# Patient Record
Sex: FEMALE | Race: WHITE | NOT HISPANIC OR LATINO | Employment: FULL TIME | URBAN - METROPOLITAN AREA
[De-identification: names, ages, dates, MRNs, and addresses within clinical notes are randomized per-mention and may not be internally consistent; named-entity substitution may affect disease eponyms.]

---

## 2019-04-04 ENCOUNTER — HOSPITAL ENCOUNTER (EMERGENCY)
Facility: HOSPITAL | Age: 59
Discharge: HOME | End: 2019-04-04
Attending: EMERGENCY MEDICINE | Admitting: EMERGENCY MEDICINE

## 2019-04-04 ENCOUNTER — OFFICE VISIT (OUTPATIENT)
Dept: FAMILY MEDICINE CLINIC | Facility: CLINIC | Age: 59
End: 2019-04-04

## 2019-04-04 ENCOUNTER — APPOINTMENT (OUTPATIENT)
Dept: CT IMAGING | Facility: HOSPITAL | Age: 59
End: 2019-04-04

## 2019-04-04 VITALS
RESPIRATION RATE: 16 BRPM | HEART RATE: 85 BPM | HEIGHT: 61 IN | BODY MASS INDEX: 28.32 KG/M2 | WEIGHT: 150 LBS | SYSTOLIC BLOOD PRESSURE: 156 MMHG | DIASTOLIC BLOOD PRESSURE: 87 MMHG | OXYGEN SATURATION: 100 % | TEMPERATURE: 98.1 F

## 2019-04-04 VITALS
BODY MASS INDEX: 25.61 KG/M2 | HEIGHT: 64 IN | TEMPERATURE: 97.8 F | DIASTOLIC BLOOD PRESSURE: 68 MMHG | WEIGHT: 150 LBS | HEART RATE: 72 BPM | SYSTOLIC BLOOD PRESSURE: 116 MMHG | RESPIRATION RATE: 12 BRPM | OXYGEN SATURATION: 98 %

## 2019-04-04 DIAGNOSIS — R10.12 LEFT UPPER QUADRANT PAIN: Primary | ICD-10-CM

## 2019-04-04 DIAGNOSIS — K29.00 ACUTE GASTRITIS WITHOUT HEMORRHAGE, UNSPECIFIED GASTRITIS TYPE: Primary | ICD-10-CM

## 2019-04-04 DIAGNOSIS — K44.9 HIATAL HERNIA: ICD-10-CM

## 2019-04-04 DIAGNOSIS — R19.7 DIARRHEA, UNSPECIFIED TYPE: ICD-10-CM

## 2019-04-04 LAB
ALBUMIN SERPL-MCNC: 4.6 G/DL (ref 3.5–5.2)
ALBUMIN/GLOB SERPL: 1.8 G/DL
ALP SERPL-CCNC: 80 U/L (ref 39–117)
ALT SERPL W P-5'-P-CCNC: 14 U/L (ref 1–33)
ANION GAP SERPL CALCULATED.3IONS-SCNC: 11.1 MMOL/L
AST SERPL-CCNC: 16 U/L (ref 1–32)
BASOPHILS # BLD AUTO: 0.06 10*3/MM3 (ref 0–0.2)
BASOPHILS NFR BLD AUTO: 1.2 % (ref 0–1.5)
BILIRUB SERPL-MCNC: <0.2 MG/DL (ref 0.2–1.2)
BILIRUB UR QL STRIP: NEGATIVE
BUN BLD-MCNC: 8 MG/DL (ref 6–20)
BUN/CREAT SERPL: 14.8 (ref 7–25)
CALCIUM SPEC-SCNC: 9.9 MG/DL (ref 8.6–10.5)
CHLORIDE SERPL-SCNC: 101 MMOL/L (ref 98–107)
CLARITY UR: CLEAR
CO2 SERPL-SCNC: 28.9 MMOL/L (ref 22–29)
COLOR UR: NORMAL
CREAT BLD-MCNC: 0.54 MG/DL (ref 0.57–1)
DEPRECATED RDW RBC AUTO: 48.2 FL (ref 37–54)
EOSINOPHIL # BLD AUTO: 0.26 10*3/MM3 (ref 0–0.4)
EOSINOPHIL NFR BLD AUTO: 5 % (ref 0.3–6.2)
ERYTHROCYTE [DISTWIDTH] IN BLOOD BY AUTOMATED COUNT: 14.7 % (ref 12.3–15.4)
GFR SERPL CREATININE-BSD FRML MDRD: 116 ML/MIN/1.73
GLOBULIN UR ELPH-MCNC: 2.5 GM/DL
GLUCOSE BLD-MCNC: 100 MG/DL (ref 65–99)
GLUCOSE UR STRIP-MCNC: NEGATIVE MG/DL
HCT VFR BLD AUTO: 44.8 % (ref 34–46.6)
HGB BLD-MCNC: 14.3 G/DL (ref 12–15.9)
HGB UR QL STRIP.AUTO: NEGATIVE
IMM GRANULOCYTES # BLD AUTO: 0.01 10*3/MM3 (ref 0–0.05)
IMM GRANULOCYTES NFR BLD AUTO: 0.2 % (ref 0–0.5)
KETONES UR QL STRIP: NEGATIVE
LEUKOCYTE ESTERASE UR QL STRIP.AUTO: NEGATIVE
LIPASE SERPL-CCNC: 24 U/L (ref 13–60)
LYMPHOCYTES # BLD AUTO: 1.72 10*3/MM3 (ref 0.7–3.1)
LYMPHOCYTES NFR BLD AUTO: 33.4 % (ref 19.6–45.3)
MCH RBC QN AUTO: 28.5 PG (ref 26.6–33)
MCHC RBC AUTO-ENTMCNC: 31.9 G/DL (ref 31.5–35.7)
MCV RBC AUTO: 89.2 FL (ref 79–97)
MONOCYTES # BLD AUTO: 0.52 10*3/MM3 (ref 0.1–0.9)
MONOCYTES NFR BLD AUTO: 10.1 % (ref 5–12)
NEUTROPHILS # BLD AUTO: 2.58 10*3/MM3 (ref 1.4–7)
NEUTROPHILS NFR BLD AUTO: 50.1 % (ref 42.7–76)
NITRITE UR QL STRIP: NEGATIVE
NRBC BLD AUTO-RTO: 0 /100 WBC (ref 0–0)
PH UR STRIP.AUTO: 6 [PH] (ref 4.5–8)
PLATELET # BLD AUTO: 317 10*3/MM3 (ref 140–450)
PMV BLD AUTO: 10 FL (ref 6–12)
POTASSIUM BLD-SCNC: 4 MMOL/L (ref 3.5–5.2)
PROT SERPL-MCNC: 7.1 G/DL (ref 6–8.5)
PROT UR QL STRIP: NEGATIVE
RBC # BLD AUTO: 5.02 10*6/MM3 (ref 3.77–5.28)
SODIUM BLD-SCNC: 141 MMOL/L (ref 136–145)
SP GR UR STRIP: 1.01 (ref 1–1.03)
TROPONIN T SERPL-MCNC: <0.01 NG/ML (ref 0–0.03)
UROBILINOGEN UR QL STRIP: NORMAL
WBC NRBC COR # BLD: 5.15 10*3/MM3 (ref 3.4–10.8)

## 2019-04-04 PROCEDURE — 74177 CT ABD & PELVIS W/CONTRAST: CPT

## 2019-04-04 PROCEDURE — 96375 TX/PRO/DX INJ NEW DRUG ADDON: CPT

## 2019-04-04 PROCEDURE — 80053 COMPREHEN METABOLIC PANEL: CPT | Performed by: PHYSICIAN ASSISTANT

## 2019-04-04 PROCEDURE — 25010000002 ONDANSETRON PER 1 MG: Performed by: PHYSICIAN ASSISTANT

## 2019-04-04 PROCEDURE — 99213 OFFICE O/P EST LOW 20 MIN: CPT | Performed by: FAMILY MEDICINE

## 2019-04-04 PROCEDURE — 0 DIATRIZOATE MEGLUMINE & SODIUM PER 1 ML: Performed by: EMERGENCY MEDICINE

## 2019-04-04 PROCEDURE — 85025 COMPLETE CBC W/AUTO DIFF WBC: CPT | Performed by: PHYSICIAN ASSISTANT

## 2019-04-04 PROCEDURE — 96374 THER/PROPH/DIAG INJ IV PUSH: CPT

## 2019-04-04 PROCEDURE — 25010000002 IOPAMIDOL 61 % SOLUTION: Performed by: EMERGENCY MEDICINE

## 2019-04-04 PROCEDURE — 83690 ASSAY OF LIPASE: CPT | Performed by: PHYSICIAN ASSISTANT

## 2019-04-04 PROCEDURE — 99284 EMERGENCY DEPT VISIT MOD MDM: CPT

## 2019-04-04 PROCEDURE — 81003 URINALYSIS AUTO W/O SCOPE: CPT | Performed by: PHYSICIAN ASSISTANT

## 2019-04-04 PROCEDURE — 84484 ASSAY OF TROPONIN QUANT: CPT | Performed by: PHYSICIAN ASSISTANT

## 2019-04-04 PROCEDURE — 99284 EMERGENCY DEPT VISIT MOD MDM: CPT | Performed by: EMERGENCY MEDICINE

## 2019-04-04 RX ORDER — DULOXETIN HYDROCHLORIDE 60 MG/1
60 CAPSULE, DELAYED RELEASE ORAL DAILY
COMMUNITY
Start: 2019-03-25 | End: 2019-06-25 | Stop reason: SDUPTHER

## 2019-04-04 RX ORDER — SODIUM CHLORIDE 9 MG/ML
INJECTION, SOLUTION INTRAVENOUS
Status: DISPENSED
Start: 2019-04-04 | End: 2019-04-05

## 2019-04-04 RX ORDER — SENNOSIDES 8.6 MG
1 TABLET ORAL NIGHTLY
COMMUNITY
End: 2022-04-03

## 2019-04-04 RX ORDER — SUCRALFATE ORAL 1 G/10ML
1 SUSPENSION ORAL
Qty: 280 ML | Refills: 0 | Status: SHIPPED | OUTPATIENT
Start: 2019-04-04 | End: 2019-04-11

## 2019-04-04 RX ORDER — ALUMINA, MAGNESIA, AND SIMETHICONE 2400; 2400; 240 MG/30ML; MG/30ML; MG/30ML
15 SUSPENSION ORAL ONCE
Status: COMPLETED | OUTPATIENT
Start: 2019-04-04 | End: 2019-04-04

## 2019-04-04 RX ORDER — ONDANSETRON 4 MG/1
4 TABLET, ORALLY DISINTEGRATING ORAL 4 TIMES DAILY PRN
Qty: 12 TABLET | Refills: 0 | Status: SHIPPED | OUTPATIENT
Start: 2019-04-04 | End: 2019-04-27

## 2019-04-04 RX ORDER — ONDANSETRON 2 MG/ML
4 INJECTION INTRAMUSCULAR; INTRAVENOUS ONCE
Status: COMPLETED | OUTPATIENT
Start: 2019-04-04 | End: 2019-04-04

## 2019-04-04 RX ORDER — SODIUM CHLORIDE 0.9 % (FLUSH) 0.9 %
10 SYRINGE (ML) INJECTION AS NEEDED
Status: DISCONTINUED | OUTPATIENT
Start: 2019-04-04 | End: 2019-04-04 | Stop reason: HOSPADM

## 2019-04-04 RX ORDER — GABAPENTIN 300 MG/1
300 CAPSULE ORAL 2 TIMES DAILY
COMMUNITY
Start: 2019-04-02 | End: 2019-10-08 | Stop reason: SDUPTHER

## 2019-04-04 RX ORDER — PANTOPRAZOLE SODIUM 40 MG/1
40 TABLET, DELAYED RELEASE ORAL DAILY
Qty: 30 TABLET | Refills: 0 | Status: SHIPPED | OUTPATIENT
Start: 2019-04-04 | End: 2019-05-20 | Stop reason: SDUPTHER

## 2019-04-04 RX ADMIN — ALUMINUM HYDROXIDE, MAGNESIUM HYDROXIDE, AND DIMETHICONE 15 ML: 400; 400; 40 SUSPENSION ORAL at 13:07

## 2019-04-04 RX ADMIN — LIDOCAINE HYDROCHLORIDE 15 ML: 20 SOLUTION ORAL; TOPICAL at 13:07

## 2019-04-04 RX ADMIN — FAMOTIDINE 20 MG: 10 INJECTION, SOLUTION INTRAVENOUS at 13:12

## 2019-04-04 RX ADMIN — DIATRIZOATE MEGLUMINE AND DIATRIZOATE SODIUM 30 ML: 600; 100 SOLUTION ORAL; RECTAL at 13:15

## 2019-04-04 RX ADMIN — IOPAMIDOL 100 ML: 612 INJECTION, SOLUTION INTRAVENOUS at 14:39

## 2019-04-04 RX ADMIN — ONDANSETRON 4 MG: 2 INJECTION, SOLUTION INTRAMUSCULAR; INTRAVENOUS at 13:11

## 2019-04-04 NOTE — ED PROVIDER NOTES
"Subjective   History of Present Illness  History of Present Illness    Chief complaint: \"I have a lot of gas\"    Location: mid epigastric, LUQ    Quality/Severity:  Bloated, severe    Timing/Duration: 1 week, constant    Modifying Factors: worse in afternoon after eating lunch.  Nothing makes better.  Has taken tums without relief.     Associated Symptoms: No.  No vomiting.  Positive occasional diarrhea in the afternoons.  Denies fevers or chills.  Denies chest pain.     Narrative: 58-year-old female with a history of IBS presents with abdominal pain and bloating as above.  She has not seen a GI physician in years.  She has never had an EGD.  She went to her primary care provider earlier today and was sent here for further evaluation with labs and CT scan.    Review of Systems   Constitutional: Negative.    Respiratory: Negative.    Cardiovascular: Negative.    Gastrointestinal: Positive for abdominal distention, abdominal pain and diarrhea. Negative for blood in stool.   Genitourinary: Negative.    Musculoskeletal: Negative.    Skin: Negative.    Neurological: Negative.    Hematological: Negative.    Psychiatric/Behavioral: Negative.    All other systems reviewed and are negative.      Past Medical History:   Diagnosis Date   • Fibromyalgia    • High cholesterol    • IBS (irritable bowel syndrome)        Allergies   Allergen Reactions   • Codeine    • Sulfa Antibiotics        History reviewed. No pertinent surgical history.    Family History   Problem Relation Age of Onset   • Hypertension Mother    • Hyperlipidemia Mother    • Hypertension Father    • Hyperlipidemia Father    • Stroke Father        Social History     Socioeconomic History   • Marital status: Unknown     Spouse name: Not on file   • Number of children: Not on file   • Years of education: Not on file   • Highest education level: Not on file   Tobacco Use   • Smoking status: Never Smoker   Substance and Sexual Activity   • Alcohol use: No   • Drug " use: No     No current facility-administered medications for this encounter.     Current Outpatient Medications:   •  DULoxetine (CYMBALTA) 60 MG capsule, Take 60 mg by mouth Daily., Disp: , Rfl:   •  gabapentin (NEURONTIN) 300 MG capsule, Take 300 mg by mouth 2 (Two) Times a Day., Disp: , Rfl:   •  senna (SENOKOT) 8.6 MG tablet tablet, Take 1 tablet by mouth Every Night., Disp: , Rfl:   •  cefdinir (OMNICEF) 300 MG capsule, Take 1 po bid, Disp: 20 capsule, Rfl: 0  •  ezetimibe-simvastatin (VYTORIN) 10-10 MG per tablet, Take 1 tablet by mouth Every Night., Disp: , Rfl:   •  methylphenidate (CONCERTA) 18 MG CR tablet, Take 18 mg by mouth Every Morning., Disp: , Rfl:   •  predniSONE (DELTASONE) 20 MG tablet, Take 3 tablets po daily x 5 days., Disp: 15 tablet, Rfl: 0  •  traZODone (DESYREL) 100 MG tablet, Take 100 mg by mouth Every Night., Disp: , Rfl:         Objective   Physical Exam  Vitals:    04/04/19 1132   BP: 157/88   Pulse: 74   Resp: 16   Temp: 98.1 °F (36.7 °C)   SpO2: 99%     GENERAL: Alert and oriented ×4.  No apparent distress.  SKIN: Warm, pink and dry  HEENT: Atraumatic normocephalic  LUNGS: Clear to auscultation bilaterally without wheezes, rales or rhonchi  CARDIAC: Regular rate and rhythm.  S1 and S2.  No murmurs, rubs or gallops.  ABD: Soft, moderate midepigastric and left upper quadrant tenderness.  No guarding or rebound tenderness.  Normal bowel sounds.  M/S: MAEW, no deformity  PSYCH: Normal mood and affect    Procedures           ED Course    Pepcid, Zofran and GI cocktail given    Results for orders placed or performed during the hospital encounter of 04/04/19   Comprehensive Metabolic Panel   Result Value Ref Range    Glucose 100 (H) 65 - 99 mg/dL    BUN 8 6 - 20 mg/dL    Creatinine 0.54 (L) 0.57 - 1.00 mg/dL    Sodium 141 136 - 145 mmol/L    Potassium 4.0 3.5 - 5.2 mmol/L    Chloride 101 98 - 107 mmol/L    CO2 28.9 22.0 - 29.0 mmol/L    Calcium 9.9 8.6 - 10.5 mg/dL    Total Protein 7.1 6.0 -  8.5 g/dL    Albumin 4.60 3.50 - 5.20 g/dL    ALT (SGPT) 14 1 - 33 U/L    AST (SGOT) 16 1 - 32 U/L    Alkaline Phosphatase 80 39 - 117 U/L    Total Bilirubin <0.2 (L) 0.2 - 1.2 mg/dL    eGFR Non African Amer 116 >60 mL/min/1.73    Globulin 2.5 gm/dL    A/G Ratio 1.8 g/dL    BUN/Creatinine Ratio 14.8 7.0 - 25.0    Anion Gap 11.1 mmol/L   Lipase   Result Value Ref Range    Lipase 24 13 - 60 U/L   Urinalysis With Microscopic If Indicated (No Culture) - Urine, Clean Catch   Result Value Ref Range    Color, UA Straw Yellow, Straw    Appearance, UA Clear Clear    pH, UA 6.0 4.5 - 8.0    Specific Gravity, UA 1.015 1.003 - 1.030    Glucose, UA Negative Negative    Ketones, UA Negative Negative    Bilirubin, UA Negative Negative    Blood, UA Negative Negative    Protein, UA Negative Negative    Leuk Esterase, UA Negative Negative    Nitrite, UA Negative Negative    Urobilinogen, UA 0.2 E.U./dL 0.2 - 1.0 E.U./dL   CBC Auto Differential   Result Value Ref Range    WBC 5.15 3.40 - 10.80 10*3/mm3    RBC 5.02 3.77 - 5.28 10*6/mm3    Hemoglobin 14.3 12.0 - 15.9 g/dL    Hematocrit 44.8 34.0 - 46.6 %    MCV 89.2 79.0 - 97.0 fL    MCH 28.5 26.6 - 33.0 pg    MCHC 31.9 31.5 - 35.7 g/dL    RDW 14.7 12.3 - 15.4 %    RDW-SD 48.2 37.0 - 54.0 fl    MPV 10.0 6.0 - 12.0 fL    Platelets 317 140 - 450 10*3/mm3    Neutrophil % 50.1 42.7 - 76.0 %    Lymphocyte % 33.4 19.6 - 45.3 %    Monocyte % 10.1 5.0 - 12.0 %    Eosinophil % 5.0 0.3 - 6.2 %    Basophil % 1.2 0.0 - 1.5 %    Immature Grans % 0.2 0.0 - 0.5 %    Neutrophils, Absolute 2.58 1.40 - 7.00 10*3/mm3    Lymphocytes, Absolute 1.72 0.70 - 3.10 10*3/mm3    Monocytes, Absolute 0.52 0.10 - 0.90 10*3/mm3    Eosinophils, Absolute 0.26 0.00 - 0.40 10*3/mm3    Basophils, Absolute 0.06 0.00 - 0.20 10*3/mm3    Immature Grans, Absolute 0.01 0.00 - 0.05 10*3/mm3    nRBC 0.0 0.0 - 0.0 /100 WBC   Troponin   Result Value Ref Range    Troponin T <0.010 0.000-<0.030 ng/mL     Reviewed ct a/p. Independently  viewed by me. Interpreted by radiologist. Discussed with pt.  Ct Abdomen Pelvis With Contrast    Result Date: 4/4/2019  Narrative: CT SCAN OF THE ABDOMEN AND PELVIS WITH CONTRAST 04/04/2019  HISTORY: Epigastric abdominal pain and diarrhea for one week.  TECHNIQUE: Spiral CT was performed through the abdomen and pelvis following oral and intravenous contrast administration. Radiation dose reduction techniques included automated exposure control or exposure modulation based on body size. Radiation audit for CT and nuclear cardiology exams in the last 12 months: 0.  ABDOMEN FINDINGS: The liver, spleen, pancreas, adrenal glands and kidneys are normal. The gallbladder is surgically absent. There is a small hiatal hernia.  PELVIS FINDINGS: There is questionable mild thickening of the wall of the sigmoid colon. This may be artifactual and related to relative under distention of the sigmoid colon with contrast. Correlate clinically for possible inflammatory or infectious sigmoid colitis. No bowel obstruction or abscess is seen. There is no significant adenopathy. There is no free fluid in the abdomen or pelvis. The lung bases are normal.       Impression: 1. Question is raised of some mild thickening of the wall of the sigmoid colon versus artifact from under distention. Correlate clinically for possible sigmoid inflammatory or infectious colitis. No bowel obstruction or abscess is seen. 2. Surgical absence of the gallbladder. 3. Small hiatal hernia.  This report was finalized on 4/4/2019 3:18 PM by Dr. Jeronimo Bowman MD.      Pt is afebrile with nl wbc. Doubt infectious.  F/u GI.  D/c with PPI, carafate, zofran.     Discussed pertinent labs and imaging findings with the patient/family.  Patient/Family voiced understanding of need to follow-up for recheck, further testing as needed.  Return to the emergency Department warnings were given.              MDM  My differential diagnosis for abdominal pain includes but is not  limited to:  Gastritis, gastroenteritis, peptic ulcer disease, GERD, esophageal perforation, acute appendicitis, mesenteric adenitis, Meckel’s diverticulum, epiploic appendagitis, diverticulitis, colon cancer, ulcerative colitis, Crohn’s disease, intussusception, small bowel obstruction, adhesions, ischemic bowel, perforated viscus, ileus, obstipation, biliary colic, cholecystitis, cholelithiasis, Samuel-Balwinder Jonathan, hepatitis, pancreatitis, common bile duct obstruction, cholangitis, bile leak, splenic trauma, splenic rupture, splenic infarction, splenic abscess, abdominal abscess, ascites, spontaneous bacterial peritonitis, hernia, UTI, cystitis,ureterolithiasis, urinary obstruction, ovarian cyst, torsion, PID, pelvic abscess, mittelschmerz, endometriosis, AAA, myocardial infarction, pneumonia, cancer, porphyria, DKA, medications, sickle cell, viral syndrome, zoster      Final diagnoses:   Acute gastritis without hemorrhage, unspecified gastritis type   Hiatal hernia       Dictated utilizing Dragon dictation       Anna Sierra PA-C  04/04/19 1539

## 2019-04-04 NOTE — PROGRESS NOTES
Subjective   Esther Duke is a 58 y.o. female is here for   Chief Complaint   Patient presents with   • Gas   • Bloated      x 1-2 weeks       Pt states she has a history of IBS.  A week ago she started having some stomach ache. She has occasional diarrhea in the afternoons.  She is concerned about possible infection in her bowels. She states the abdominal pain has been getting worse over the past week. Her stool has been smelling worse and has become somewhat gelatinous and mucousy. She has not been on any antibiotics recently.  No blood in her stool. No vomiting. No chest pain, fever, or SOA.         The following portions of the patient's history were reviewed and updated as appropriate: allergies, current medications, past family history, past medical history, past social history, past surgical history and problem list.    Past Medical History:   Diagnosis Date   • Fibromyalgia    • High cholesterol    • IBS (irritable bowel syndrome)        Family History   Problem Relation Age of Onset   • Hypertension Mother    • Hyperlipidemia Mother    • Hypertension Father    • Hyperlipidemia Father    • Stroke Father        Social History     Socioeconomic History   • Marital status: Unknown     Spouse name: Not on file   • Number of children: Not on file   • Years of education: Not on file   • Highest education level: Not on file   Tobacco Use   • Smoking status: Never Smoker   Substance and Sexual Activity   • Alcohol use: No   • Drug use: No         Current Outpatient Medications:   •  DULoxetine (CYMBALTA) 60 MG capsule, Take 60 mg by mouth Daily., Disp: , Rfl:   •  gabapentin (NEURONTIN) 300 MG capsule, Take 300 mg by mouth 2 (Two) Times a Day., Disp: , Rfl:   •  ondansetron ODT (ZOFRAN-ODT) 4 MG disintegrating tablet, Take 1 tablet by mouth 4 (Four) Times a Day As Needed for Nausea or Vomiting., Disp: 12 tablet, Rfl: 0  •  pantoprazole (PROTONIX) 40 MG EC tablet, Take 1 tablet by mouth Daily., Disp: 30 tablet,  Rfl: 0  •  senna (SENOKOT) 8.6 MG tablet tablet, Take 1 tablet by mouth Every Night., Disp: , Rfl:   •  sucralfate (CARAFATE) 1 GM/10ML suspension, Take 10 mL by mouth 4 (Four) Times a Day With Meals & at Bedtime for 7 days., Disp: 280 mL, Rfl: 0    Review of Systems   Constitutional: Negative.  Negative for activity change, chills, fever and unexpected weight change.   HENT: Negative.  Negative for congestion.    Eyes: Negative.  Negative for visual disturbance.   Respiratory: Negative.  Negative for shortness of breath and wheezing.    Cardiovascular: Positive for chest pain. Negative for palpitations.        Tightness in chest   Gastrointestinal: Positive for abdominal distention, abdominal pain and diarrhea. Negative for blood in stool and constipation.   Endocrine: Negative.  Negative for cold intolerance and heat intolerance.   Genitourinary: Negative.  Negative for difficulty urinating and hematuria.   Musculoskeletal: Negative.  Negative for gait problem and neck stiffness.   Skin: Negative.  Negative for color change and rash.   Allergic/Immunologic: Negative.  Negative for immunocompromised state.   Neurological: Negative.  Negative for weakness and light-headedness.   Hematological: Negative.  Negative for adenopathy.   Psychiatric/Behavioral: Negative.  Negative for sleep disturbance. The patient is not nervous/anxious.        Objective   Physical Exam   Constitutional: She is oriented to person, place, and time. She appears well-developed and well-nourished. No distress.   HENT:   Head: Normocephalic and atraumatic.   Right Ear: External ear normal.   Left Ear: External ear normal.   Nose: Nose normal.   Mouth/Throat: Oropharynx is clear and moist. No oropharyngeal exudate.   Eyes: EOM are normal. Pupils are equal, round, and reactive to light. Right eye exhibits no discharge. Left eye exhibits no discharge. No scleral icterus.   Neck: Normal range of motion. Neck supple. No tracheal deviation present.  No thyromegaly present.   Cardiovascular: Normal rate, regular rhythm, normal heart sounds and intact distal pulses. Exam reveals no gallop and no friction rub.   No murmur heard.  Pulmonary/Chest: Effort normal and breath sounds normal. No stridor. No respiratory distress. She has no wheezes. She has no rales.   Abdominal: Normal appearance. Bowel sounds are increased. There is no hepatosplenomegaly. There is tenderness in the left upper quadrant and left lower quadrant. There is no rebound, no guarding, no CVA tenderness, no tenderness at McBurney's point and negative Franklin's sign.   Musculoskeletal: Normal range of motion. She exhibits no edema.   Lymphadenopathy:     She has no cervical adenopathy.   Neurological: She is alert and oriented to person, place, and time.   Skin: Skin is warm and dry. No rash noted. She is not diaphoretic. No erythema. No pallor.   Nursing note and vitals reviewed.      Procedures    Assessment/Plan   Esther was seen today for gas and bloated.    Diagnoses and all orders for this visit:    Left upper quadrant pain    Diarrhea, unspecified type      I advised the patient to go to the emergency room due to her history of abdominal pain and abdominal pain on physical exam.  She may have colitis or an acute abdomen.  She agrees to go to the emergency room now.  She does not want an ambulance.

## 2019-04-04 NOTE — ED NOTES
"Chief Complaint   Patient presents with   • Abdominal Pain     has had an increase in \"gas\" after eating lunch.  Seen by PMD and sent in to ED          Sofia Terrell RN  04/04/19 0375    "

## 2019-04-17 ENCOUNTER — OFFICE VISIT (OUTPATIENT)
Dept: GASTROENTEROLOGY | Facility: CLINIC | Age: 59
End: 2019-04-17

## 2019-04-17 VITALS
SYSTOLIC BLOOD PRESSURE: 132 MMHG | WEIGHT: 151 LBS | HEIGHT: 61 IN | DIASTOLIC BLOOD PRESSURE: 84 MMHG | BODY MASS INDEX: 28.51 KG/M2

## 2019-04-17 DIAGNOSIS — K21.9 GASTROESOPHAGEAL REFLUX DISEASE, ESOPHAGITIS PRESENCE NOT SPECIFIED: ICD-10-CM

## 2019-04-17 DIAGNOSIS — R09.89 GLOBUS SENSATION: ICD-10-CM

## 2019-04-17 DIAGNOSIS — R10.12 LEFT UPPER QUADRANT PAIN: Primary | ICD-10-CM

## 2019-04-17 DIAGNOSIS — R93.5 ABNORMAL CT OF THE ABDOMEN: ICD-10-CM

## 2019-04-17 DIAGNOSIS — R10.30 LOWER ABDOMINAL PAIN: ICD-10-CM

## 2019-04-17 DIAGNOSIS — R19.4 CHANGE IN BOWEL HABITS: ICD-10-CM

## 2019-04-17 DIAGNOSIS — K59.00 CONSTIPATION, UNSPECIFIED CONSTIPATION TYPE: ICD-10-CM

## 2019-04-17 PROCEDURE — 99204 OFFICE O/P NEW MOD 45 MIN: CPT | Performed by: INTERNAL MEDICINE

## 2019-04-17 RX ORDER — SUCRALFATE 1 G/1
TABLET ORAL
COMMUNITY
Start: 2019-04-08 | End: 2019-04-27

## 2019-04-17 RX ORDER — SODIUM, POTASSIUM,MAG SULFATES 17.5-3.13G
1 SOLUTION, RECONSTITUTED, ORAL ORAL EVERY 12 HOURS
Qty: 2 BOTTLE | Refills: 0 | Status: SHIPPED | OUTPATIENT
Start: 2019-04-17 | End: 2019-04-27

## 2019-04-17 NOTE — PROGRESS NOTES
PATIENT INFORMATION  Esther Duke       - 1960    CHIEF COMPLAINT  Chief Complaint   Patient presents with   • Abdominal Pain   • Constipation   • Heartburn       HISTORY OF PRESENT ILLNESS  HPI    57 yo with long history of chronic constipation dependent on senna daily and fibromyalgia. She has noticed increasing bloating, gas, decreased bm and some mucus in  The stool. Weight has been increasing. Last CLS was about 6 years ago, normal per her recollection. No egd.  No dysphagia or odynophagia.  She has had epigastric pain, burning worse at night. No radiation, moderate severity, with constant globus sensation.                                                                                            She did go the about 2 weeks ago and abdominal pain and had CT done:  HISTORY:  Epigastric abdominal pain and diarrhea for one week.     TECHNIQUE:  Spiral CT was performed through the abdomen and pelvis following oral  and intravenous contrast administration. Radiation dose reduction  techniques included automated exposure control or exposure modulation  based on body size. Radiation audit for CT and nuclear cardiology exams  in the last 12 months: 0.     ABDOMEN FINDINGS:  The liver, spleen, pancreas, adrenal glands and kidneys are normal. The  gallbladder is surgically absent. There is a small hiatal hernia.     PELVIS FINDINGS:  There is questionable mild thickening of the wall of the sigmoid colon.  This may be artifactual and related to relative under distention of the  sigmoid colon with contrast. Correlate clinically for possible  inflammatory or infectious sigmoid colitis. No bowel obstruction or  abscess is seen. There is no significant adenopathy. There is no free  fluid in the abdomen or pelvis. The lung bases are normal.        IMPRESSION:  1. Question is raised of some mild thickening of the wall of the sigmoid  colon versus artifact from under distention. Correlate clinically  for  possible sigmoid inflammatory or infectious colitis. No bowel  obstruction or abscess is seen.  2. Surgical absence of the gallbladder.  3. Small hiatal hernia.      Coffee in am  One glass of tea  No etoh  No soda  Dinner at 530  Bed at 8  No snack      There has been one month of llq pain and worsening constipation. Having a bm does partially help.    REVIEW OF SYSTEMS  Review of Systems   Constitutional: Positive for fatigue.   HENT: Positive for mouth sores and sinus pressure.    Gastrointestinal: Positive for abdominal pain and constipation.        Reflux   Genitourinary: Positive for dyspareunia and frequency.   Musculoskeletal: Positive for arthralgias, back pain, myalgias, neck pain and neck stiffness.   Allergic/Immunologic: Positive for environmental allergies.   Psychiatric/Behavioral: The patient is nervous/anxious.    All other systems reviewed and are negative.        ACTIVE PROBLEMS  There are no active problems to display for this patient.        PAST MEDICAL HISTORY  Past Medical History:   Diagnosis Date   • Fibromyalgia    • High cholesterol    • IBS (irritable bowel syndrome)          SURGICAL HISTORY  Past Surgical History:   Procedure Laterality Date   • COLONOSCOPY           FAMILY HISTORY  Family History   Problem Relation Age of Onset   • Hypertension Mother    • Hyperlipidemia Mother    • Hypertension Father    • Hyperlipidemia Father    • Stroke Father    • Colon cancer Neg Hx    • Colon polyps Neg Hx          SOCIAL HISTORY  Social History     Occupational History   • Not on file   Tobacco Use   • Smoking status: Never Smoker   Substance and Sexual Activity   • Alcohol use: No   • Drug use: No   • Sexual activity: Not on file       Debilities/Disabilities Identified: None    Emotional Behavior: Appropriate    CURRENT MEDICATIONS    Current Outpatient Medications:   •  azithromycin (ZITHROMAX Z-ELEAZAR) 250 MG tablet, Take 2 tablets the first day, then 1 tablet daily for 4 days., Disp: 6  "tablet, Rfl: 0  •  DULoxetine (CYMBALTA) 60 MG capsule, Take 60 mg by mouth Daily., Disp: , Rfl:   •  gabapentin (NEURONTIN) 300 MG capsule, Take 300 mg by mouth 2 (Two) Times a Day., Disp: , Rfl:   •  ondansetron ODT (ZOFRAN-ODT) 4 MG disintegrating tablet, Take 1 tablet by mouth 4 (Four) Times a Day As Needed for Nausea or Vomiting., Disp: 12 tablet, Rfl: 0  •  pantoprazole (PROTONIX) 40 MG EC tablet, Take 1 tablet by mouth Daily., Disp: 30 tablet, Rfl: 0  •  senna (SENOKOT) 8.6 MG tablet tablet, Take 1 tablet by mouth Every Night., Disp: , Rfl:   •  sodium-potassium-magnesium sulfates (SUPREP BOWEL PREP KIT) 17.5-3.13-1.6 GM/177ML solution oral solution, Take 1 bottle by mouth Every 12 (Twelve) Hours., Disp: 2 bottle, Rfl: 0  •  sucralfate (CARAFATE) 1 g tablet, , Disp: , Rfl:     ALLERGIES  Codeine and Sulfa antibiotics    VITALS  Vitals:    04/17/19 1225   BP: 132/84   Weight: 68.5 kg (151 lb)   Height: 154.9 cm (60.98\")       LAST RESULTS   Admission on 04/04/2019, Discharged on 04/04/2019   Component Date Value Ref Range Status   • Glucose 04/04/2019 100* 65 - 99 mg/dL Final   • BUN 04/04/2019 8  6 - 20 mg/dL Final   • Creatinine 04/04/2019 0.54* 0.57 - 1.00 mg/dL Final   • Sodium 04/04/2019 141  136 - 145 mmol/L Final   • Potassium 04/04/2019 4.0  3.5 - 5.2 mmol/L Final   • Chloride 04/04/2019 101  98 - 107 mmol/L Final   • CO2 04/04/2019 28.9  22.0 - 29.0 mmol/L Final   • Calcium 04/04/2019 9.9  8.6 - 10.5 mg/dL Final   • Total Protein 04/04/2019 7.1  6.0 - 8.5 g/dL Final   • Albumin 04/04/2019 4.60  3.50 - 5.20 g/dL Final   • ALT (SGPT) 04/04/2019 14  1 - 33 U/L Final   • AST (SGOT) 04/04/2019 16  1 - 32 U/L Final   • Alkaline Phosphatase 04/04/2019 80  39 - 117 U/L Final   • Total Bilirubin 04/04/2019 <0.2* 0.2 - 1.2 mg/dL Final   • eGFR Non African Amer 04/04/2019 116  >60 mL/min/1.73 Final   • Globulin 04/04/2019 2.5  gm/dL Final   • A/G Ratio 04/04/2019 1.8  g/dL Final   • BUN/Creatinine Ratio " 04/04/2019 14.8  7.0 - 25.0 Final   • Anion Gap 04/04/2019 11.1  mmol/L Final   • Lipase 04/04/2019 24  13 - 60 U/L Final   • Color, UA 04/04/2019 Straw  Yellow, Straw Final   • Appearance, UA 04/04/2019 Clear  Clear Final   • pH, UA 04/04/2019 6.0  4.5 - 8.0 Final   • Specific Gravity, UA 04/04/2019 1.015  1.003 - 1.030 Final   • Glucose, UA 04/04/2019 Negative  Negative Final   • Ketones, UA 04/04/2019 Negative  Negative Final   • Bilirubin, UA 04/04/2019 Negative  Negative Final   • Blood, UA 04/04/2019 Negative  Negative Final   • Protein, UA 04/04/2019 Negative  Negative Final   • Leuk Esterase, UA 04/04/2019 Negative  Negative Final   • Nitrite, UA 04/04/2019 Negative  Negative Final   • Urobilinogen, UA 04/04/2019 0.2 E.U./dL  0.2 - 1.0 E.U./dL Final   • WBC 04/04/2019 5.15  3.40 - 10.80 10*3/mm3 Final   • RBC 04/04/2019 5.02  3.77 - 5.28 10*6/mm3 Final   • Hemoglobin 04/04/2019 14.3  12.0 - 15.9 g/dL Final   • Hematocrit 04/04/2019 44.8  34.0 - 46.6 % Final   • MCV 04/04/2019 89.2  79.0 - 97.0 fL Final   • MCH 04/04/2019 28.5  26.6 - 33.0 pg Final   • MCHC 04/04/2019 31.9  31.5 - 35.7 g/dL Final   • RDW 04/04/2019 14.7  12.3 - 15.4 % Final   • RDW-SD 04/04/2019 48.2  37.0 - 54.0 fl Final   • MPV 04/04/2019 10.0  6.0 - 12.0 fL Final   • Platelets 04/04/2019 317  140 - 450 10*3/mm3 Final   • Neutrophil % 04/04/2019 50.1  42.7 - 76.0 % Final   • Lymphocyte % 04/04/2019 33.4  19.6 - 45.3 % Final   • Monocyte % 04/04/2019 10.1  5.0 - 12.0 % Final   • Eosinophil % 04/04/2019 5.0  0.3 - 6.2 % Final   • Basophil % 04/04/2019 1.2  0.0 - 1.5 % Final   • Immature Grans % 04/04/2019 0.2  0.0 - 0.5 % Final   • Neutrophils, Absolute 04/04/2019 2.58  1.40 - 7.00 10*3/mm3 Final   • Lymphocytes, Absolute 04/04/2019 1.72  0.70 - 3.10 10*3/mm3 Final   • Monocytes, Absolute 04/04/2019 0.52  0.10 - 0.90 10*3/mm3 Final   • Eosinophils, Absolute 04/04/2019 0.26  0.00 - 0.40 10*3/mm3 Final   • Basophils, Absolute 04/04/2019 0.06   0.00 - 0.20 10*3/mm3 Final   • Immature Grans, Absolute 04/04/2019 0.01  0.00 - 0.05 10*3/mm3 Final   • nRBC 04/04/2019 0.0  0.0 - 0.0 /100 WBC Final   • Troponin T 04/04/2019 <0.010  0.000-<0.030 ng/mL Final     Ct Abdomen Pelvis With Contrast    Result Date: 4/4/2019  Narrative: CT SCAN OF THE ABDOMEN AND PELVIS WITH CONTRAST 04/04/2019  HISTORY: Epigastric abdominal pain and diarrhea for one week.  TECHNIQUE: Spiral CT was performed through the abdomen and pelvis following oral and intravenous contrast administration. Radiation dose reduction techniques included automated exposure control or exposure modulation based on body size. Radiation audit for CT and nuclear cardiology exams in the last 12 months: 0.  ABDOMEN FINDINGS: The liver, spleen, pancreas, adrenal glands and kidneys are normal. The gallbladder is surgically absent. There is a small hiatal hernia.  PELVIS FINDINGS: There is questionable mild thickening of the wall of the sigmoid colon. This may be artifactual and related to relative under distention of the sigmoid colon with contrast. Correlate clinically for possible inflammatory or infectious sigmoid colitis. No bowel obstruction or abscess is seen. There is no significant adenopathy. There is no free fluid in the abdomen or pelvis. The lung bases are normal.       Impression: 1. Question is raised of some mild thickening of the wall of the sigmoid colon versus artifact from under distention. Correlate clinically for possible sigmoid inflammatory or infectious colitis. No bowel obstruction or abscess is seen. 2. Surgical absence of the gallbladder. 3. Small hiatal hernia.  This report was finalized on 4/4/2019 3:18 PM by Dr. Jeronimo Bowman MD.        PHYSICAL EXAM  Physical Exam   Constitutional: She is oriented to person, place, and time. She appears well-developed and well-nourished. No distress.   HENT:   Head: Normocephalic and atraumatic.   Mouth/Throat: Oropharynx is clear and moist.   Eyes:  EOM are normal. Pupils are equal, round, and reactive to light.   Neck: Normal range of motion. No tracheal deviation present.   Cardiovascular: Normal rate, regular rhythm, normal heart sounds and intact distal pulses. Exam reveals no gallop and no friction rub.   No murmur heard.  Pulmonary/Chest: Effort normal and breath sounds normal. No stridor. No respiratory distress. She has no wheezes. She has no rales. She exhibits no tenderness.   Abdominal: Soft. Bowel sounds are normal. She exhibits no distension. There is no tenderness. There is no rebound and no guarding.   Epigastric and llq pain, mild no rebound or guarding   Musculoskeletal: She exhibits no edema.   Lymphadenopathy:     She has no cervical adenopathy.   Neurological: She is alert and oriented to person, place, and time.   Skin: Skin is warm. She is not diaphoretic.   Psychiatric: She has a normal mood and affect. Her behavior is normal. Judgment and thought content normal.   Nursing note and vitals reviewed.      ASSESSMENT  Diagnoses and all orders for this visit:    Left upper quadrant pain  -     Cancel: Case Request; Standing  -     Cancel: Case Request  -     External Facility Surgical/Procedural Request; Future    Constipation, unspecified constipation type  -     Cancel: Case Request; Standing  -     Cancel: Case Request  -     External Facility Surgical/Procedural Request; Future    Gastroesophageal reflux disease, esophagitis presence not specified  -     Cancel: Case Request; Standing  -     Cancel: Case Request  -     External Facility Surgical/Procedural Request; Future    Lower abdominal pain  -     External Facility Surgical/Procedural Request; Future    Abnormal CT of the abdomen  -     External Facility Surgical/Procedural Request; Future    Change in bowel habits  -     External Facility Surgical/Procedural Request; Future    Other orders  -     sucralfate (CARAFATE) 1 g tablet  -     Cancel: Follow Anesthesia Guidelines / Standing  Orders; Future  -     Cancel: Implement Anesthesia orders day of procedure.; Standing  -     Cancel: Obtain informed consent; Standing  -     sodium-potassium-magnesium sulfates (SUPREP BOWEL PREP KIT) 17.5-3.13-1.6 GM/177ML solution oral solution; Take 1 bottle by mouth Every 12 (Twelve) Hours.          PLAN  No Follow-up on file.        cls from 2013 reviewed, normal  Labs from ed reviewed. Normal cbc and cmp and lipase  Antireflux measures and dietary modifications reviewed. Low acid diet reviewed. Keep head of bed elevated. Stop eating/drinking at least 3 hours prior to bedtime. Eliminate caffeine and carbonated beverages.  Weight loss encouraged if BMI over 25.Indications, risks and procedure were discussed with the patient, including but not limited to, bleeding, infection, possibility of perforation and possible polypectomy. All of the patient's questions were answered, and signed informed consent was obtained and placed on the chart.

## 2019-04-23 ENCOUNTER — TELEPHONE (OUTPATIENT)
Dept: GASTROENTEROLOGY | Facility: CLINIC | Age: 59
End: 2019-04-23

## 2019-04-26 ENCOUNTER — TELEPHONE (OUTPATIENT)
Dept: FAMILY MEDICINE CLINIC | Facility: CLINIC | Age: 59
End: 2019-04-26

## 2019-04-26 RX ORDER — AZITHROMYCIN 250 MG/1
TABLET, FILM COATED ORAL
Qty: 6 TABLET | Refills: 0 | Status: SHIPPED | OUTPATIENT
Start: 2019-04-26 | End: 2019-04-27

## 2019-04-26 NOTE — TELEPHONE ENCOUNTER
Pt called and states she is having ear pain x's 3 days would like to know if you could call in a medication

## 2019-05-14 ENCOUNTER — OUTSIDE FACILITY SERVICE (OUTPATIENT)
Dept: GASTROENTEROLOGY | Facility: CLINIC | Age: 59
End: 2019-05-14

## 2019-05-14 PROCEDURE — 88305 TISSUE EXAM BY PATHOLOGIST: CPT | Performed by: INTERNAL MEDICINE

## 2019-05-14 PROCEDURE — 43239 EGD BIOPSY SINGLE/MULTIPLE: CPT | Performed by: INTERNAL MEDICINE

## 2019-05-14 PROCEDURE — 45378 DIAGNOSTIC COLONOSCOPY: CPT | Performed by: INTERNAL MEDICINE

## 2019-05-15 ENCOUNTER — LAB REQUISITION (OUTPATIENT)
Dept: LAB | Facility: HOSPITAL | Age: 59
End: 2019-05-15

## 2019-05-15 DIAGNOSIS — R10.12 LEFT UPPER QUADRANT PAIN: ICD-10-CM

## 2019-05-16 LAB
CYTO UR: NORMAL
LAB AP CASE REPORT: NORMAL
LAB AP CLINICAL INFORMATION: NORMAL
PATH REPORT.FINAL DX SPEC: NORMAL
PATH REPORT.GROSS SPEC: NORMAL

## 2019-05-17 NOTE — PROGRESS NOTES
Gastric biopsies with some gastritis no H. pylori.  Small bowel biopsies are normal.  Gastric polyp is fundic gland.  Esophageal biopsies with some reflux esophagitis no Almonte's.  Put her on for repeat colonoscopy in 10 years

## 2019-05-20 RX ORDER — PANTOPRAZOLE SODIUM 40 MG/1
40 TABLET, DELAYED RELEASE ORAL DAILY
Qty: 90 TABLET | Refills: 3 | Status: SHIPPED | OUTPATIENT
Start: 2019-05-20 | End: 2020-12-08 | Stop reason: SDUPTHER

## 2019-05-21 RX ORDER — GABAPENTIN 300 MG/1
CAPSULE ORAL
Qty: 90 CAPSULE | Refills: 2 | OUTPATIENT
Start: 2019-05-21

## 2019-05-22 ENCOUNTER — OFFICE VISIT (OUTPATIENT)
Dept: FAMILY MEDICINE CLINIC | Facility: CLINIC | Age: 59
End: 2019-05-22

## 2019-05-22 VITALS
OXYGEN SATURATION: 98 % | HEIGHT: 65 IN | DIASTOLIC BLOOD PRESSURE: 64 MMHG | WEIGHT: 149 LBS | HEART RATE: 68 BPM | BODY MASS INDEX: 24.83 KG/M2 | TEMPERATURE: 98.1 F | SYSTOLIC BLOOD PRESSURE: 118 MMHG

## 2019-05-22 DIAGNOSIS — F32.9 REACTIVE DEPRESSION: ICD-10-CM

## 2019-05-22 DIAGNOSIS — K21.9 GASTROESOPHAGEAL REFLUX DISEASE WITHOUT ESOPHAGITIS: ICD-10-CM

## 2019-05-22 DIAGNOSIS — M79.7 FIBROMYALGIA: Primary | ICD-10-CM

## 2019-05-22 DIAGNOSIS — E78.49 OTHER HYPERLIPIDEMIA: ICD-10-CM

## 2019-05-22 LAB
ALBUMIN SERPL-MCNC: 4.5 G/DL (ref 3.5–5.2)
ALBUMIN/GLOB SERPL: 2 G/DL
ALP SERPL-CCNC: 83 U/L (ref 39–117)
ALT SERPL-CCNC: 13 U/L (ref 1–33)
AST SERPL-CCNC: 15 U/L (ref 1–32)
BILIRUB SERPL-MCNC: 0.2 MG/DL (ref 0.2–1.2)
BUN SERPL-MCNC: 9 MG/DL (ref 6–20)
BUN/CREAT SERPL: 12.5 (ref 7–25)
CALCIUM SERPL-MCNC: 10 MG/DL (ref 8.6–10.5)
CHLORIDE SERPL-SCNC: 101 MMOL/L (ref 98–107)
CHOLEST SERPL-MCNC: 285 MG/DL (ref 0–200)
CHOLEST/HDLC SERPL: 3.24 {RATIO}
CO2 SERPL-SCNC: 28.8 MMOL/L (ref 22–29)
CREAT SERPL-MCNC: 0.72 MG/DL (ref 0.57–1)
GLOBULIN SER CALC-MCNC: 2.2 GM/DL
GLUCOSE SERPL-MCNC: 100 MG/DL (ref 65–99)
HDLC SERPL-MCNC: 88 MG/DL (ref 40–60)
LDLC SERPL CALC-MCNC: 178 MG/DL (ref 0–100)
POTASSIUM SERPL-SCNC: 4.5 MMOL/L (ref 3.5–5.2)
PROT SERPL-MCNC: 6.7 G/DL (ref 6–8.5)
SODIUM SERPL-SCNC: 142 MMOL/L (ref 136–145)
TRIGL SERPL-MCNC: 97 MG/DL (ref 0–150)
TSH SERPL DL<=0.005 MIU/L-ACNC: 3.16 MIU/ML (ref 0.27–4.2)
VLDLC SERPL CALC-MCNC: 19.4 MG/DL

## 2019-05-22 PROCEDURE — 99213 OFFICE O/P EST LOW 20 MIN: CPT | Performed by: INTERNAL MEDICINE

## 2019-05-22 RX ORDER — CYCLOSPORINE 0.5 MG/ML
EMULSION OPHTHALMIC
COMMUNITY
Start: 2019-04-29 | End: 2021-03-24

## 2019-05-22 NOTE — PROGRESS NOTES
MILVIA@  Esther DERRELL Duke is a 59 y.o. female.     Chief Complaint   Patient presents with   • Hypertension   • Fibromyalgia   • Depression       History of Present Illness   Patient here for follow-up on fibromyalgia, depression, pain.  She cannot tolerate statins she is stop all of them.  She tried different ones.  Reports she wants her life back does not want the pain that comes with statin.  Not interested in taking more.  She has seen Dr. Stringer recently had EGD and colonoscopy.  She is on Protonix.  Still follows with the rheumatologist Dr. Hayes.  Reviewed patient colonoscopy and EGD Report.  The following portions of the patient's history were reviewed and updated as appropriate: allergies, current medications, past family history, past medical history, past social history, past surgical history and problem list.    Review of Systems   Constitutional: Negative for activity change, appetite change, fatigue and fever.   Eyes: Negative for blurred vision and double vision.   Respiratory: Negative for shortness of breath.    Cardiovascular: Negative for chest pain, palpitations and leg swelling.   Gastrointestinal: Negative.    Musculoskeletal: Positive for myalgias.   Neurological: Negative for dizziness, syncope, light-headedness and headache.       Allergies   Allergen Reactions   • Codeine    • Sulfa Antibiotics        Current Outpatient Medications on File Prior to Visit   Medication Sig Dispense Refill   • DULoxetine (CYMBALTA) 60 MG capsule Take 60 mg by mouth Daily.     • gabapentin (NEURONTIN) 300 MG capsule Take 300 mg by mouth 2 (Two) Times a Day.     • pantoprazole (PROTONIX) 40 MG EC tablet Take 1 tablet by mouth Daily. 90 tablet 3   • senna (SENOKOT) 8.6 MG tablet tablet Take 1 tablet by mouth Every Night.     • amoxicillin (AMOXIL) 875 MG tablet Take 1 tablet by mouth 2 (Two) Times a Day. 20 tablet 0   • RESTASIS 0.05 % ophthalmic emulsion        No current facility-administered  medications on file prior to visit.        Family History   Problem Relation Age of Onset   • Hypertension Mother    • Hyperlipidemia Mother    • Hypertension Father    • Hyperlipidemia Father    • Stroke Father    • Colon cancer Neg Hx    • Colon polyps Neg Hx        Past Medical History:   Diagnosis Date   • Fibromyalgia    • Hernia, hiatal    • High cholesterol    • IBS (irritable bowel syndrome)        Past Surgical History:   Procedure Laterality Date   • COLONOSCOPY         Social History     Socioeconomic History   • Marital status: Unknown     Spouse name: Not on file   • Number of children: Not on file   • Years of education: Not on file   • Highest education level: Not on file   Tobacco Use   • Smoking status: Never Smoker   Substance and Sexual Activity   • Alcohol use: No   • Drug use: No       Patient Active Problem List   Diagnosis   • Fibromyalgia   • Other hyperlipidemia   • Reactive depression   • Gastroesophageal reflux disease without esophagitis       Vitals:    05/22/19 0853   BP: 118/64   Pulse: 68   Temp: 98.1 °F (36.7 °C)   SpO2: 98%       Objective   Physical Exam   Constitutional: She is oriented to person, place, and time. She appears well-developed.   Eyes: Pupils are equal, round, and reactive to light.   Neck: Normal range of motion. Neck supple. No JVD present. No tracheal deviation present. No thyromegaly present.   Cardiovascular: Normal rate, regular rhythm and intact distal pulses.   No murmur heard.  Pulmonary/Chest: Effort normal and breath sounds normal. No respiratory distress. She has no wheezes.   Abdominal: Soft. Bowel sounds are normal.   Musculoskeletal: She exhibits tenderness. She exhibits no edema.   Lymphadenopathy:     She has no cervical adenopathy.   Neurological: She is alert and oriented to person, place, and time.   Psychiatric: She has a normal mood and affect. Her behavior is normal.         Assessment/Plan   Esther was seen today for hypertension, fibromyalgia  and depression.    Diagnoses and all orders for this visit:    Fibromyalgia  -     Lipid Panel With / Chol / HDL Ratio  -     Comprehensive Metabolic Panel  -     TSH    Reactive depression  -     Lipid Panel With / Chol / HDL Ratio  -     Comprehensive Metabolic Panel  -     TSH    Other hyperlipidemia    Gastroesophageal reflux disease without esophagitis    Continue diet and exercise.  Discussed with patient statin.  She continues to decline it at this time.  Patient on Neurontin.  Kaspar and consent done.  Report if does not take Neurontin has a lot of pain.  She is off Concerta and statins now.  Return in 3 months

## 2019-05-24 RX ORDER — EZETIMIBE 10 MG/1
10 TABLET ORAL DAILY
Qty: 30 TABLET | Refills: 2 | Status: SHIPPED | OUTPATIENT
Start: 2019-05-24 | End: 2020-04-15

## 2019-06-26 RX ORDER — DULOXETIN HYDROCHLORIDE 60 MG/1
CAPSULE, DELAYED RELEASE ORAL
Qty: 30 CAPSULE | Refills: 1 | Status: SHIPPED | OUTPATIENT
Start: 2019-06-26 | End: 2019-08-26 | Stop reason: SDUPTHER

## 2019-08-26 RX ORDER — DULOXETIN HYDROCHLORIDE 60 MG/1
CAPSULE, DELAYED RELEASE ORAL
Qty: 30 CAPSULE | Refills: 5 | Status: SHIPPED | OUTPATIENT
Start: 2019-08-26 | End: 2020-02-19

## 2019-09-25 ENCOUNTER — OFFICE VISIT (OUTPATIENT)
Dept: GASTROENTEROLOGY | Facility: CLINIC | Age: 59
End: 2019-09-25

## 2019-09-25 VITALS
SYSTOLIC BLOOD PRESSURE: 122 MMHG | BODY MASS INDEX: 25.66 KG/M2 | HEIGHT: 65 IN | WEIGHT: 154 LBS | DIASTOLIC BLOOD PRESSURE: 72 MMHG

## 2019-09-25 DIAGNOSIS — R14.0 BLOATING: ICD-10-CM

## 2019-09-25 DIAGNOSIS — K21.9 GASTROESOPHAGEAL REFLUX DISEASE, ESOPHAGITIS PRESENCE NOT SPECIFIED: Primary | ICD-10-CM

## 2019-09-25 PROCEDURE — 99213 OFFICE O/P EST LOW 20 MIN: CPT | Performed by: INTERNAL MEDICINE

## 2019-09-25 NOTE — PROGRESS NOTES
"    PATIENT INFORMATION  Esther Duke       - 1960    CHIEF COMPLAINT  Chief Complaint   Patient presents with   • Follow-up     follow up on EGD       HISTORY OF PRESENT ILLNESS  HPI    She is here in follow up after egd and cls. Pathology is reviewed with her:  1.  Stomach, Biopsy:                A.  Chemical gastropathy.               B.  Negative for Helicobacter by routine staining.               C.  No metaplasia, dysplasia or malignancy identified.      2.  Small Bowel, Biopsy:  Benign small bowel mucosa with               A. Normal intact villous surface.               B. No significant inflammation, no granulomas.               C. No viral inclusions or other organisms on routinely stained sections.      3.  \"Gastric Polyp\", Biopsy:                A.  Fragment of gastric mucosa with chemical gastropathy.               B.  Benign fundic gland polyp.                C.  No metaplasia, dysplasia or malignancy identified.      4.  Distal Esophagus, Biopsy:                A.  Benign gastroesophageal junction mucosa with reflux esophagitis.               B.  Negative for goblet cell metaplasia and dysplasia.      She has some ongoing issues with heartburn at night. She takes her ppi at night.  Weight is slightly up.  She notes some bloating and gas after meals.    REVIEW OF SYSTEMS  Review of Systems   Gastrointestinal: Positive for abdominal pain.        Reflux   All other systems reviewed and are negative.        ACTIVE PROBLEMS  Patient Active Problem List    Diagnosis   • Fibromyalgia [M79.7]   • Other hyperlipidemia [E78.49]   • Reactive depression [F32.9]   • Gastroesophageal reflux disease without esophagitis [K21.9]         PAST MEDICAL HISTORY  Past Medical History:   Diagnosis Date   • Fibromyalgia    • Hernia, hiatal    • High cholesterol    • IBS (irritable bowel syndrome)          SURGICAL HISTORY  Past Surgical History:   Procedure Laterality Date   • COLONOSCOPY           FAMILY " "HISTORY  Family History   Problem Relation Age of Onset   • Hypertension Mother    • Hyperlipidemia Mother    • Hypertension Father    • Hyperlipidemia Father    • Stroke Father    • Colon cancer Neg Hx    • Colon polyps Neg Hx          SOCIAL HISTORY  Social History     Occupational History   • Not on file   Tobacco Use   • Smoking status: Never Smoker   Substance and Sexual Activity   • Alcohol use: No   • Drug use: No   • Sexual activity: Not on file       Debilities/Disabilities Identified: None    Emotional Behavior: Appropriate    CURRENT MEDICATIONS    Current Outpatient Medications:   •  amoxicillin (AMOXIL) 875 MG tablet, Take 1 tablet by mouth 2 (Two) Times a Day., Disp: 20 tablet, Rfl: 0  •  DULoxetine (CYMBALTA) 60 MG capsule, TAKE ONE CAPSULE BY MOUTH DAILY, Disp: 30 capsule, Rfl: 5  •  ezetimibe (ZETIA) 10 MG tablet, Take 1 tablet by mouth Daily., Disp: 30 tablet, Rfl: 2  •  gabapentin (NEURONTIN) 300 MG capsule, Take 300 mg by mouth 2 (Two) Times a Day., Disp: , Rfl:   •  pantoprazole (PROTONIX) 40 MG EC tablet, Take 1 tablet by mouth Daily., Disp: 90 tablet, Rfl: 3  •  RESTASIS 0.05 % ophthalmic emulsion, , Disp: , Rfl:   •  senna (SENOKOT) 8.6 MG tablet tablet, Take 1 tablet by mouth Every Night., Disp: , Rfl:     ALLERGIES  Codeine and Sulfa antibiotics    VITALS  Vitals:    09/25/19 1333   BP: 122/72   Weight: 69.9 kg (154 lb)   Height: 165.1 cm (65\")       LAST RESULTS   Office Visit on 05/22/2019   Component Date Value Ref Range Status   • Total Cholesterol 05/22/2019 285* 0 - 200 mg/dL Final   • Triglycerides 05/22/2019 97  0 - 150 mg/dL Final   • HDL Cholesterol 05/22/2019 88* 40 - 60 mg/dL Final   • VLDL Cholesterol 05/22/2019 19.4  mg/dL Final   • LDL Cholesterol  05/22/2019 178* 0 - 100 mg/dL Final   • Chol/HDL Ratio 05/22/2019 3.24   Final   • Glucose 05/22/2019 100* 65 - 99 mg/dL Final   • BUN 05/22/2019 9  6 - 20 mg/dL Final   • Creatinine 05/22/2019 0.72  0.57 - 1.00 mg/dL Final   • " eGFR Non African Am 05/22/2019 83  >60 mL/min/1.73 Final   • eGFR African Am 05/22/2019 101  >60 mL/min/1.73 Final   • BUN/Creatinine Ratio 05/22/2019 12.5  7.0 - 25.0 Final   • Sodium 05/22/2019 142  136 - 145 mmol/L Final   • Potassium 05/22/2019 4.5  3.5 - 5.2 mmol/L Final   • Chloride 05/22/2019 101  98 - 107 mmol/L Final   • Total CO2 05/22/2019 28.8  22.0 - 29.0 mmol/L Final   • Calcium 05/22/2019 10.0  8.6 - 10.5 mg/dL Final   • Total Protein 05/22/2019 6.7  6.0 - 8.5 g/dL Final   • Albumin 05/22/2019 4.50  3.50 - 5.20 g/dL Final   • Globulin 05/22/2019 2.2  gm/dL Final   • A/G Ratio 05/22/2019 2.0  g/dL Final   • Total Bilirubin 05/22/2019 0.2  0.2 - 1.2 mg/dL Final   • Alkaline Phosphatase 05/22/2019 83  39 - 117 U/L Final   • AST (SGOT) 05/22/2019 15  1 - 32 U/L Final   • ALT (SGPT) 05/22/2019 13  1 - 33 U/L Final   • TSH 05/22/2019 3.160  0.270 - 4.200 mIU/mL Final     No results found.    PHYSICAL EXAM  Physical Exam   Constitutional: She is oriented to person, place, and time. She appears well-developed and well-nourished. No distress.   HENT:   Head: Normocephalic and atraumatic.   Mouth/Throat: Oropharynx is clear and moist.   Eyes: EOM are normal. Pupils are equal, round, and reactive to light.   Neck: Normal range of motion. No tracheal deviation present.   Cardiovascular: Normal rate, regular rhythm, normal heart sounds and intact distal pulses. Exam reveals no gallop and no friction rub.   No murmur heard.  Pulmonary/Chest: Effort normal and breath sounds normal. No stridor. No respiratory distress. She has no wheezes. She has no rales. She exhibits no tenderness.   Abdominal: Soft. Bowel sounds are normal. She exhibits no distension. There is no tenderness. There is no rebound and no guarding.   Musculoskeletal: She exhibits no edema.   Lymphadenopathy:     She has no cervical adenopathy.   Neurological: She is alert and oriented to person, place, and time.   Skin: Skin is warm. She is not  diaphoretic.   Psychiatric: She has a normal mood and affect. Her behavior is normal. Judgment and thought content normal.   Nursing note and vitals reviewed.      ASSESSMENT  Diagnoses and all orders for this visit:    Gastroesophageal reflux disease, esophagitis presence not specified    Bloating          PLAN  No Follow-up on file.      Switch ppi to am on empty stomach and wait 30 minutes before eating.  Align once daily  Antireflux measures and dietary modifications reviewed. Low acid diet reviewed. Keep head of bed elevated. Stop eating/drinking at least 3 hours prior to bedtime. Eliminate caffeine and carbonated beverages.  Weight loss encouraged if BMI over 25.

## 2019-10-07 RX ORDER — GABAPENTIN 300 MG/1
CAPSULE ORAL
Qty: 90 CAPSULE | Refills: 1 | OUTPATIENT
Start: 2019-10-07

## 2019-10-08 ENCOUNTER — OFFICE VISIT (OUTPATIENT)
Dept: FAMILY MEDICINE CLINIC | Facility: CLINIC | Age: 59
End: 2019-10-08

## 2019-10-08 VITALS
HEIGHT: 65 IN | DIASTOLIC BLOOD PRESSURE: 72 MMHG | HEART RATE: 73 BPM | RESPIRATION RATE: 14 BRPM | BODY MASS INDEX: 25.66 KG/M2 | WEIGHT: 154 LBS | TEMPERATURE: 98 F | OXYGEN SATURATION: 98 % | SYSTOLIC BLOOD PRESSURE: 120 MMHG

## 2019-10-08 DIAGNOSIS — F32.9 REACTIVE DEPRESSION: ICD-10-CM

## 2019-10-08 DIAGNOSIS — E78.49 OTHER HYPERLIPIDEMIA: Primary | ICD-10-CM

## 2019-10-08 DIAGNOSIS — K21.9 GASTROESOPHAGEAL REFLUX DISEASE WITHOUT ESOPHAGITIS: ICD-10-CM

## 2019-10-08 DIAGNOSIS — M79.7 FIBROMYALGIA: ICD-10-CM

## 2019-10-08 PROCEDURE — 99214 OFFICE O/P EST MOD 30 MIN: CPT | Performed by: INTERNAL MEDICINE

## 2019-10-08 RX ORDER — GABAPENTIN 300 MG/1
300 CAPSULE ORAL 2 TIMES DAILY
Qty: 60 CAPSULE | Refills: 2 | Status: SHIPPED | OUTPATIENT
Start: 2019-10-08 | End: 2020-01-13

## 2019-10-08 NOTE — PROGRESS NOTES
MILVIA@  Esther DERRELL Duke is a 59 y.o. female.     Chief Complaint   Patient presents with   • Med Refill   Follow-up for hyperlipidemia, fibromyalgia, depression.    History of Present Illness   Patient here for follow-up for hyperlipidemia fibromyalgia and depression.  Denies any chest pain or shortness of breath.  Taking her medications.  Unable to tolerate any statins.  Reports her fibromyalgia is under control with Neurontin.  Depression is also under control with Cymbalta.  Denies any HI or SI.  The following portions of the patient's history were reviewed and updated as appropriate: allergies, current medications, past family history, past medical history, past social history, past surgical history and problem list.    Review of Systems   HENT: Negative for congestion and dental problem.    Eyes: Negative.    Respiratory: Negative.    Cardiovascular: Negative.    Gastrointestinal: Negative.    Genitourinary: Negative for difficulty urinating, dyspareunia and hematuria.   Musculoskeletal: Positive for arthralgias and myalgias.   Skin: Negative.    Neurological: Negative.    Psychiatric/Behavioral: Negative.        Allergies   Allergen Reactions   • Codeine    • Sulfa Antibiotics        Current Outpatient Medications on File Prior to Visit   Medication Sig Dispense Refill   • DULoxetine (CYMBALTA) 60 MG capsule TAKE ONE CAPSULE BY MOUTH DAILY 30 capsule 5   • ezetimibe (ZETIA) 10 MG tablet Take 1 tablet by mouth Daily. 30 tablet 2   • pantoprazole (PROTONIX) 40 MG EC tablet Take 1 tablet by mouth Daily. 90 tablet 3   • RESTASIS 0.05 % ophthalmic emulsion      • senna (SENOKOT) 8.6 MG tablet tablet Take 1 tablet by mouth Every Night.     • [DISCONTINUED] gabapentin (NEURONTIN) 300 MG capsule Take 300 mg by mouth 2 (Two) Times a Day.     • amoxicillin (AMOXIL) 875 MG tablet Take 1 tablet by mouth 2 (Two) Times a Day. 20 tablet 0     No current facility-administered medications on file prior to visit.         Family History   Problem Relation Age of Onset   • Hypertension Mother    • Hyperlipidemia Mother    • Hypertension Father    • Hyperlipidemia Father    • Stroke Father    • Colon cancer Neg Hx    • Colon polyps Neg Hx        Past Medical History:   Diagnosis Date   • Fibromyalgia    • Hernia, hiatal    • High cholesterol    • IBS (irritable bowel syndrome)        Past Surgical History:   Procedure Laterality Date   • COLONOSCOPY         Social History     Socioeconomic History   • Marital status: Unknown     Spouse name: Not on file   • Number of children: Not on file   • Years of education: Not on file   • Highest education level: Not on file   Tobacco Use   • Smoking status: Never Smoker   • Smokeless tobacco: Never Used   Substance and Sexual Activity   • Alcohol use: No   • Drug use: No   • Sexual activity: Defer       Patient Active Problem List   Diagnosis   • Fibromyalgia   • Other hyperlipidemia   • Reactive depression   • Gastroesophageal reflux disease without esophagitis       Vitals:    10/08/19 1139   BP: 120/72   Pulse: 73   Resp: 14   Temp: 98 °F (36.7 °C)   SpO2: 98%       Objective   Physical Exam   Constitutional: She is oriented to person, place, and time. She appears well-developed and well-nourished. No distress.   HENT:   Head: Normocephalic and atraumatic.   Right Ear: External ear normal.   Left Ear: External ear normal.   Mouth/Throat: Oropharynx is clear and moist.   Eyes: EOM are normal. Pupils are equal, round, and reactive to light.   Neck: Normal range of motion. Neck supple. No JVD present. No tracheal deviation present. No thyromegaly present.   Cardiovascular: Normal rate, regular rhythm, normal heart sounds and intact distal pulses. Exam reveals no gallop and no friction rub.   No murmur heard.  Pulmonary/Chest: Effort normal and breath sounds normal. No stridor. No respiratory distress. She has no wheezes. She has no rales. She exhibits no tenderness.   Abdominal: Soft.  Bowel sounds are normal. She exhibits no distension and no mass. There is no tenderness. There is no rebound and no guarding. No hernia.   Musculoskeletal: Normal range of motion. She exhibits no edema, tenderness or deformity.   Lymphadenopathy:     She has no cervical adenopathy.   Neurological: She is alert and oriented to person, place, and time.   Cranial nerves II through XII grossly intact, DTR 2+, motor 5/5   Skin: Skin is warm and dry. She is not diaphoretic.   Psychiatric: She has a normal mood and affect. Her behavior is normal.         Assessment/Plan   Esther was seen today for med refill.    Diagnoses and all orders for this visit:    Other hyperlipidemia  -     Comprehensive Metabolic Panel  -     Lipid Panel With / Chol / HDL Ratio  -     TSH    Gastroesophageal reflux disease without esophagitis  -     Comprehensive Metabolic Panel  -     Lipid Panel With / Chol / HDL Ratio  -     TSH    Fibromyalgia  -     Comprehensive Metabolic Panel  -     Lipid Panel With / Chol / HDL Ratio  -     TSH    Reactive depression  -     Comprehensive Metabolic Panel  -     Lipid Panel With / Chol / HDL Ratio  -     TSH    Other orders  -     gabapentin (NEURONTIN) 300 MG capsule; Take 1 capsule by mouth 2 (Two) Times a Day.    Continue diet and exercise.  Continue all current medication.  Patient cannot tolerate statins.  She is inquiring about new cholesterol medication that is given by injection like Prolia.  Discussed with patient if she wants it done I have to refer her to 1 of the cardiologist.  I do not administer that.  Continue all current medication.  All her problems are chronic and stable.

## 2020-01-06 DIAGNOSIS — M79.7 FIBROMYALGIA: Primary | ICD-10-CM

## 2020-01-13 RX ORDER — GABAPENTIN 300 MG/1
CAPSULE ORAL
Qty: 60 CAPSULE | Refills: 2 | Status: SHIPPED | OUTPATIENT
Start: 2020-01-13 | End: 2020-04-20

## 2020-01-15 ENCOUNTER — OFFICE VISIT (OUTPATIENT)
Dept: GASTROENTEROLOGY | Facility: CLINIC | Age: 60
End: 2020-01-15

## 2020-01-15 VITALS
HEIGHT: 65 IN | SYSTOLIC BLOOD PRESSURE: 122 MMHG | BODY MASS INDEX: 25.76 KG/M2 | WEIGHT: 154.6 LBS | DIASTOLIC BLOOD PRESSURE: 68 MMHG

## 2020-01-15 DIAGNOSIS — K21.9 GASTROESOPHAGEAL REFLUX DISEASE, ESOPHAGITIS PRESENCE NOT SPECIFIED: Primary | ICD-10-CM

## 2020-01-15 PROCEDURE — 99213 OFFICE O/P EST LOW 20 MIN: CPT | Performed by: INTERNAL MEDICINE

## 2020-01-15 RX ORDER — ESTRADIOL 0.1 MG/G
1 CREAM VAGINAL 2 TIMES WEEKLY
COMMUNITY
Start: 2020-01-09 | End: 2021-01-08

## 2020-01-15 NOTE — PROGRESS NOTES
PATIENT INFORMATION  Esther Duke       - 1960    CHIEF COMPLAINT  Chief Complaint   Patient presents with   • Follow-up     3 mo follow up on GERD       HISTORY OF PRESENT ILLNESS  HPI  She is here today in follow up for gerd and is under better control since starting this in am. No dysphagia or odynophagia.  Weight has been stable.      REVIEW OF SYSTEMS  Review of Systems   All other systems reviewed and are negative.        ACTIVE PROBLEMS  Patient Active Problem List    Diagnosis   • Fibromyalgia [M79.7]   • Other hyperlipidemia [E78.49]   • Reactive depression [F32.9]   • Gastroesophageal reflux disease without esophagitis [K21.9]         PAST MEDICAL HISTORY  Past Medical History:   Diagnosis Date   • Fibromyalgia    • Hernia, hiatal    • High cholesterol    • IBS (irritable bowel syndrome)          SURGICAL HISTORY  Past Surgical History:   Procedure Laterality Date   • COLONOSCOPY           FAMILY HISTORY  Family History   Problem Relation Age of Onset   • Hypertension Mother    • Hyperlipidemia Mother    • Hypertension Father    • Hyperlipidemia Father    • Stroke Father    • Colon cancer Neg Hx    • Colon polyps Neg Hx          SOCIAL HISTORY  Social History     Occupational History   • Not on file   Tobacco Use   • Smoking status: Never Smoker   • Smokeless tobacco: Never Used   Substance and Sexual Activity   • Alcohol use: No   • Drug use: No   • Sexual activity: Defer       Debilities/Disabilities Identified: None    Emotional Behavior: Appropriate    CURRENT MEDICATIONS    Current Outpatient Medications:   •  estradiol (ESTRACE) 0.1 MG/GM vaginal cream, Insert 1 g into the vagina 2 (Two) Times a Week., Disp: , Rfl:   •  amoxicillin (AMOXIL) 875 MG tablet, Take 1 tablet by mouth 2 (Two) Times a Day., Disp: 20 tablet, Rfl: 0  •  DULoxetine (CYMBALTA) 60 MG capsule, TAKE ONE CAPSULE BY MOUTH DAILY, Disp: 30 capsule, Rfl: 5  •  ezetimibe (ZETIA) 10 MG tablet, Take 1 tablet by mouth  "Daily., Disp: 30 tablet, Rfl: 2  •  gabapentin (NEURONTIN) 300 MG capsule, TAKE ONE CAPSULE BY MOUTH TWICE A DAY, Disp: 60 capsule, Rfl: 2  •  pantoprazole (PROTONIX) 40 MG EC tablet, Take 1 tablet by mouth Daily., Disp: 90 tablet, Rfl: 3  •  RESTASIS 0.05 % ophthalmic emulsion, , Disp: , Rfl:   •  senna (SENOKOT) 8.6 MG tablet tablet, Take 1 tablet by mouth Every Night., Disp: , Rfl:     ALLERGIES  Codeine and Sulfa antibiotics    VITALS  Vitals:    01/15/20 1305   BP: 122/68   Weight: 70.1 kg (154 lb 9.6 oz)   Height: 165.1 cm (65\")       LAST RESULTS   Office Visit on 05/22/2019   Component Date Value Ref Range Status   • Total Cholesterol 05/22/2019 285* 0 - 200 mg/dL Final   • Triglycerides 05/22/2019 97  0 - 150 mg/dL Final   • HDL Cholesterol 05/22/2019 88* 40 - 60 mg/dL Final   • VLDL Cholesterol 05/22/2019 19.4  mg/dL Final   • LDL Cholesterol  05/22/2019 178* 0 - 100 mg/dL Final   • Chol/HDL Ratio 05/22/2019 3.24   Final   • Glucose 05/22/2019 100* 65 - 99 mg/dL Final   • BUN 05/22/2019 9  6 - 20 mg/dL Final   • Creatinine 05/22/2019 0.72  0.57 - 1.00 mg/dL Final   • eGFR Non African Am 05/22/2019 83  >60 mL/min/1.73 Final   • eGFR African Am 05/22/2019 101  >60 mL/min/1.73 Final   • BUN/Creatinine Ratio 05/22/2019 12.5  7.0 - 25.0 Final   • Sodium 05/22/2019 142  136 - 145 mmol/L Final   • Potassium 05/22/2019 4.5  3.5 - 5.2 mmol/L Final   • Chloride 05/22/2019 101  98 - 107 mmol/L Final   • Total CO2 05/22/2019 28.8  22.0 - 29.0 mmol/L Final   • Calcium 05/22/2019 10.0  8.6 - 10.5 mg/dL Final   • Total Protein 05/22/2019 6.7  6.0 - 8.5 g/dL Final   • Albumin 05/22/2019 4.50  3.50 - 5.20 g/dL Final   • Globulin 05/22/2019 2.2  gm/dL Final   • A/G Ratio 05/22/2019 2.0  g/dL Final   • Total Bilirubin 05/22/2019 0.2  0.2 - 1.2 mg/dL Final   • Alkaline Phosphatase 05/22/2019 83  39 - 117 U/L Final   • AST (SGOT) 05/22/2019 15  1 - 32 U/L Final   • ALT (SGPT) 05/22/2019 13  1 - 33 U/L Final   • TSH 05/22/2019 " 3.160  0.270 - 4.200 mIU/mL Final     No results found.    PHYSICAL EXAM  Physical Exam   Constitutional: She is oriented to person, place, and time. She appears well-developed and well-nourished. No distress.   HENT:   Head: Normocephalic and atraumatic.   Mouth/Throat: Oropharynx is clear and moist.   Eyes: Pupils are equal, round, and reactive to light. EOM are normal.   Neck: Normal range of motion. No tracheal deviation present.   Cardiovascular: Normal rate, regular rhythm, normal heart sounds and intact distal pulses. Exam reveals no gallop and no friction rub.   No murmur heard.  Pulmonary/Chest: Effort normal and breath sounds normal. No stridor. No respiratory distress. She has no wheezes. She has no rales. She exhibits no tenderness.   Abdominal: Soft. Bowel sounds are normal. She exhibits no distension. There is no tenderness. There is no rebound and no guarding.   Musculoskeletal: She exhibits no edema.   Lymphadenopathy:     She has no cervical adenopathy.   Neurological: She is alert and oriented to person, place, and time.   Skin: Skin is warm. She is not diaphoretic.   Psychiatric: She has a normal mood and affect. Her behavior is normal. Judgment and thought content normal.   Nursing note and vitals reviewed.      ASSESSMENT  Diagnoses and all orders for this visit:    Gastroesophageal reflux disease, esophagitis presence not specified    Other orders  -     estradiol (ESTRACE) 0.1 MG/GM vaginal cream; Insert 1 g into the vagina 2 (Two) Times a Week.          PLAN  No follow-ups on file.    Continue on ppi, slow wean trial as discussed.  Discussed long term issues with ppi use.  Last dexa was a long time ago and thinks there was some osteopenia.

## 2020-01-28 ENCOUNTER — OFFICE VISIT (OUTPATIENT)
Dept: FAMILY MEDICINE CLINIC | Facility: CLINIC | Age: 60
End: 2020-01-28

## 2020-01-28 VITALS
WEIGHT: 153 LBS | SYSTOLIC BLOOD PRESSURE: 132 MMHG | BODY MASS INDEX: 26.12 KG/M2 | DIASTOLIC BLOOD PRESSURE: 82 MMHG | OXYGEN SATURATION: 98 % | HEIGHT: 64 IN | HEART RATE: 84 BPM | TEMPERATURE: 98 F

## 2020-01-28 DIAGNOSIS — K21.9 GASTROESOPHAGEAL REFLUX DISEASE WITHOUT ESOPHAGITIS: ICD-10-CM

## 2020-01-28 DIAGNOSIS — F32.9 REACTIVE DEPRESSION: ICD-10-CM

## 2020-01-28 DIAGNOSIS — E78.49 OTHER HYPERLIPIDEMIA: Primary | ICD-10-CM

## 2020-01-28 DIAGNOSIS — M79.7 FIBROMYALGIA: ICD-10-CM

## 2020-01-28 PROCEDURE — 99213 OFFICE O/P EST LOW 20 MIN: CPT | Performed by: INTERNAL MEDICINE

## 2020-01-28 NOTE — PROGRESS NOTES
"Subjective   Esther Duke is a 59 y.o. female is here for   Chief Complaint   Patient presents with   • Hyperlipidemia       Hyperlipidemia          The following portions of the patient's history were reviewed and updated as appropriate: allergies, current medications, past family history, past medical history, past social history, past surgical history and problem list.     reports that she has never smoked. She has never used smokeless tobacco. She reports that she does not drink alcohol or use drugs.    Review of Systems     PHQ-9 Depression Screening  Little interest or pleasure in doing things?     Feeling down, depressed, or hopeless?     Trouble falling or staying asleep, or sleeping too much?     Feeling tired or having little energy?     Poor appetite or overeating?     Feeling bad about yourself - or that you are a failure or have let yourself or your family down?     Trouble concentrating on things, such as reading the newspaper or watching television?     Moving or speaking so slowly that other people could have noticed? Or the opposite - being so fidgety or restless that you have been moving around a lot more than usual?     Thoughts that you would be better off dead, or of hurting yourself in some way?     PHQ-9 Total Score     If you checked off any problems, how difficult have these problems made it for you to do your work, take care of things at home, or get along with other people?           Objective   /82 (BP Location: Right arm, Patient Position: Sitting, Cuff Size: Adult)   Pulse 84   Temp 98 °F (36.7 °C)   Ht 162.6 cm (64\")   Wt 69.4 kg (153 lb)   SpO2 98%   BMI 26.26 kg/m²   Physical Exam    Procedures    Assessment/Plan   There are no diagnoses linked to this encounter.         "

## 2020-01-28 NOTE — PROGRESS NOTES
MILVIA@  Estherashley Duke is a 59 y.o. female.     Chief Complaint   Patient presents with   • Hyperlipidemia   Depression, fibromyalgia.    History of Present Illness   Patient follow-up for depression, fibromyalgia hyperlipidemia.  She is taking gabapentin.  She used to be on Concerta that she is off.  Unable to take cholesterol medications she has myalgias she has tried several of them.  She wants to try Livalo.  Depression is under control with current medication.  Fibromyalgia is also under control with Neurontin.  Kaspar and consent done  The following portions of the patient's history were reviewed and updated as appropriate: allergies, current medications, past family history, past medical history, past social history, past surgical history and problem list.    Review of Systems   Respiratory: Negative.    Cardiovascular: Negative.    Gastrointestinal: Negative.    Musculoskeletal: Positive for myalgias.        Under controlled   Neurological: Negative.    Psychiatric/Behavioral: Negative for agitation and behavioral problems.       Allergies   Allergen Reactions   • Codeine GI Intolerance   • Sulfa Antibiotics Hives       Current Outpatient Medications on File Prior to Visit   Medication Sig Dispense Refill   • DULoxetine (CYMBALTA) 60 MG capsule TAKE ONE CAPSULE BY MOUTH DAILY 30 capsule 5   • gabapentin (NEURONTIN) 300 MG capsule TAKE ONE CAPSULE BY MOUTH TWICE A DAY 60 capsule 2   • pantoprazole (PROTONIX) 40 MG EC tablet Take 1 tablet by mouth Daily. 90 tablet 3   • RESTASIS 0.05 % ophthalmic emulsion      • senna (SENOKOT) 8.6 MG tablet tablet Take 1 tablet by mouth Every Night.     • amoxicillin (AMOXIL) 875 MG tablet Take 1 tablet by mouth 2 (Two) Times a Day. 20 tablet 0   • estradiol (ESTRACE) 0.1 MG/GM vaginal cream Insert 1 g into the vagina 2 (Two) Times a Week.     • ezetimibe (ZETIA) 10 MG tablet Take 1 tablet by mouth Daily. 30 tablet 2     No current facility-administered  "medications on file prior to visit.        Family History   Problem Relation Age of Onset   • Hypertension Mother    • Hyperlipidemia Mother    • Hypertension Father    • Hyperlipidemia Father    • Stroke Father    • Colon cancer Neg Hx    • Colon polyps Neg Hx        Past Medical History:   Diagnosis Date   • Fibromyalgia    • Hernia, hiatal    • High cholesterol    • IBS (irritable bowel syndrome)        Past Surgical History:   Procedure Laterality Date   • COLONOSCOPY         Social History     Socioeconomic History   • Marital status: Unknown     Spouse name: Not on file   • Number of children: Not on file   • Years of education: Not on file   • Highest education level: Not on file   Tobacco Use   • Smoking status: Never Smoker   • Smokeless tobacco: Never Used   Substance and Sexual Activity   • Alcohol use: No   • Drug use: No   • Sexual activity: Defer       Patient Active Problem List   Diagnosis   • Fibromyalgia   • Other hyperlipidemia   • Reactive depression   • Gastroesophageal reflux disease without esophagitis       /82 (BP Location: Right arm, Patient Position: Sitting, Cuff Size: Adult)   Pulse 84   Temp 98 °F (36.7 °C)   Ht 162.6 cm (64\")   Wt 69.4 kg (153 lb)   SpO2 98%   BMI 26.26 kg/m²   Body mass index is 26.26 kg/m².    Objective   Physical Exam   Constitutional: She is oriented to person, place, and time. She appears well-developed.   Neck: Normal range of motion. Neck supple. No JVD present. No tracheal deviation present. No thyromegaly present.   Cardiovascular: Normal rate, regular rhythm and intact distal pulses.   No murmur heard.  Pulmonary/Chest: Effort normal and breath sounds normal. No respiratory distress. She has no wheezes.   Abdominal: Soft. Bowel sounds are normal.   Musculoskeletal: She exhibits no edema.   Lymphadenopathy:     She has no cervical adenopathy.   Neurological: She is alert and oriented to person, place, and time. She displays normal reflexes. No " cranial nerve deficit or sensory deficit. She exhibits normal muscle tone. Coordination normal.   Nursing note and vitals reviewed.        Assessment/Plan   Esther was seen today for hyperlipidemia.    Diagnoses and all orders for this visit:    Other hyperlipidemia    Gastroesophageal reflux disease without esophagitis    Fibromyalgia    Reactive depression    Other orders  -     pitavastatin calcium (LIVALO) 2 MG tablet tablet; Take 1 tablet by mouth Every Night.    Continue diet and exercise.  Continue Cymbalta, Neurontin, Protonix.  New prescription for Livalo.  If have muscle pains and need to stop.  Continue diet and exercise.  All her problems are chronic and stable except for cholesterol secondary to unable to take any statins.  Patient does not want to cut Cymbalta yet.  Return in 3 months time.  Matthew and consent done

## 2020-01-29 LAB
ALBUMIN SERPL-MCNC: 4.6 G/DL (ref 3.5–5.2)
ALBUMIN/GLOB SERPL: 2.2 G/DL
ALP SERPL-CCNC: 89 U/L (ref 39–117)
ALT SERPL-CCNC: 13 U/L (ref 1–33)
AST SERPL-CCNC: 15 U/L (ref 1–32)
BILIRUB SERPL-MCNC: 0.2 MG/DL (ref 0.2–1.2)
BUN SERPL-MCNC: 8 MG/DL (ref 6–20)
BUN/CREAT SERPL: 9.4 (ref 7–25)
CALCIUM SERPL-MCNC: 9.8 MG/DL (ref 8.6–10.5)
CHLORIDE SERPL-SCNC: 101 MMOL/L (ref 98–107)
CHOLEST SERPL-MCNC: 297 MG/DL (ref 0–200)
CHOLEST/HDLC SERPL: 3.26 {RATIO}
CO2 SERPL-SCNC: 27.6 MMOL/L (ref 22–29)
CREAT SERPL-MCNC: 0.85 MG/DL (ref 0.57–1)
GLOBULIN SER CALC-MCNC: 2.1 GM/DL
GLUCOSE SERPL-MCNC: 107 MG/DL (ref 65–99)
HDLC SERPL-MCNC: 91 MG/DL (ref 40–60)
LDLC SERPL CALC-MCNC: 189 MG/DL (ref 0–100)
POTASSIUM SERPL-SCNC: 4.8 MMOL/L (ref 3.5–5.2)
PROT SERPL-MCNC: 6.7 G/DL (ref 6–8.5)
SODIUM SERPL-SCNC: 141 MMOL/L (ref 136–145)
TRIGL SERPL-MCNC: 85 MG/DL (ref 0–150)
TSH SERPL DL<=0.005 MIU/L-ACNC: 3.23 UIU/ML (ref 0.27–4.2)
VLDLC SERPL CALC-MCNC: 17 MG/DL

## 2020-02-19 RX ORDER — DULOXETIN HYDROCHLORIDE 60 MG/1
CAPSULE, DELAYED RELEASE ORAL
Qty: 30 CAPSULE | Refills: 5 | Status: SHIPPED | OUTPATIENT
Start: 2020-02-19 | End: 2020-05-05

## 2020-04-13 DIAGNOSIS — M79.7 FIBROMYALGIA: ICD-10-CM

## 2020-04-15 ENCOUNTER — OFFICE VISIT (OUTPATIENT)
Dept: CARDIOLOGY | Facility: CLINIC | Age: 60
End: 2020-04-15

## 2020-04-15 VITALS
HEART RATE: 74 BPM | WEIGHT: 158 LBS | HEIGHT: 64 IN | BODY MASS INDEX: 26.98 KG/M2 | DIASTOLIC BLOOD PRESSURE: 80 MMHG | SYSTOLIC BLOOD PRESSURE: 130 MMHG

## 2020-04-15 DIAGNOSIS — E78.49 OTHER HYPERLIPIDEMIA: Primary | ICD-10-CM

## 2020-04-15 PROCEDURE — 93000 ELECTROCARDIOGRAM COMPLETE: CPT | Performed by: INTERNAL MEDICINE

## 2020-04-15 PROCEDURE — 99203 OFFICE O/P NEW LOW 30 MIN: CPT | Performed by: INTERNAL MEDICINE

## 2020-04-15 NOTE — PROGRESS NOTES
Date of Office Visit: 04/15/2020  Encounter Provider: Deisy Chavez MD  Place of Service: [facility] CARDIOLOGY  Patient Name: Esther Dkue  :1960      Patient ID:  Esther Duke is a 59 y.o. female is here for hyperlipidemia.         History of Present Illness    Her parents are Nancy and Neftali Hutson who are patients of mine.  They both are follow-up for cardiac issues including hypertension and her father has atrial fibrillation.    She has random nonexertional chest tightness which she thinks is due to fibromyalgia.  She does not feel her heart racing or skipping.  Sometimes she gets severe headaches with dizziness and has had near syncope associated with that.  She believes the headaches are migrainous in nature as they are usually associated with nausea and get better if she just lays down for the day.  She has no orthopnea or PND.  She has no history of blood clots, diabetes, hypertension, asthma, emphysema, seizures, stroke, cancer or blood clots.  She does not exercise regularly due to fibromyalgia.  She also has irritable bowel syndrome and GERD.  She has had severe muscle aching associate with rosuvastatin, atorvastatin and simvastatin.  She is currently on Livalo and seems to be tolerating it well.    Labs done 2020 show glucose 107, otherwise normal CMP, TSH normal, HDL 91, , total cholesterol 297, triglycerides 85.  CT of the abdomen and pelvis done 2019 has limited cardiac imaging showing no evidence of coronary artery calcification on these limited images.  There is noted to be abdominal aortic calcification but no evidence of aneurysm.    She is , has 2 children works as a .  She uses no cigarettes, alcohol or drugs.  She has 2 cups of coffee per day.  Her father is a history of strokes.  Her mother and father both have hypertension.  Her father also has atrial fibrillation.    Past Medical History:    Diagnosis Date   • Fibromyalgia    • Gastroesophageal reflux disease without esophagitis    • Hernia, hiatal    • High cholesterol    • IBS (irritable bowel syndrome)    • Other hyperlipidemia    • Reactive depression          Past Surgical History:   Procedure Laterality Date   • COLONOSCOPY         Current Outpatient Medications on File Prior to Visit   Medication Sig Dispense Refill   • DULoxetine (CYMBALTA) 60 MG capsule TAKE ONE CAPSULE BY MOUTH DAILY 30 capsule 5   • gabapentin (NEURONTIN) 300 MG capsule TAKE ONE CAPSULE BY MOUTH TWICE A DAY 60 capsule 2   • pitavastatin calcium (LIVALO) 2 MG tablet tablet Take 1 tablet by mouth Every Night. 30 tablet 3   • RESTASIS 0.05 % ophthalmic emulsion      • senna (SENOKOT) 8.6 MG tablet tablet Take 1 tablet by mouth Every Night.     • estradiol (ESTRACE) 0.1 MG/GM vaginal cream Insert 1 g into the vagina 2 (Two) Times a Week.     • pantoprazole (PROTONIX) 40 MG EC tablet Take 1 tablet by mouth Daily. 90 tablet 3   • [DISCONTINUED] amoxicillin (AMOXIL) 875 MG tablet Take 1 tablet by mouth 2 (Two) Times a Day. 20 tablet 0   • [DISCONTINUED] ezetimibe (ZETIA) 10 MG tablet Take 1 tablet by mouth Daily. 30 tablet 2     No current facility-administered medications on file prior to visit.        Social History     Socioeconomic History   • Marital status: Unknown     Spouse name: Not on file   • Number of children: Not on file   • Years of education: Not on file   • Highest education level: Not on file   Tobacco Use   • Smoking status: Never Smoker   • Smokeless tobacco: Never Used   • Tobacco comment: caffeine use   Substance and Sexual Activity   • Alcohol use: No   • Drug use: No   • Sexual activity: Defer           Review of Systems   Constitution: Negative.   HENT: Negative for congestion.    Eyes: Negative for vision loss in left eye and vision loss in right eye.   Respiratory: Negative.  Negative for cough, hemoptysis, shortness of breath, sleep disturbances due to  "breathing, snoring, sputum production and wheezing.    Endocrine: Negative.    Hematologic/Lymphatic: Negative.    Skin: Negative for poor wound healing and rash.   Musculoskeletal: Negative for falls, gout, muscle cramps and myalgias.   Gastrointestinal: Negative for abdominal pain, diarrhea, dysphagia, hematemesis, melena, nausea and vomiting.   Neurological: Negative for excessive daytime sleepiness, dizziness, headaches, light-headedness, loss of balance, seizures and vertigo.   Psychiatric/Behavioral: Negative for depression and substance abuse. The patient is not nervous/anxious.        Procedures    ECG 12 Lead  Date/Time: 4/15/2020 9:31 AM  Performed by: Deisy Chavez MD  Authorized by: Deisy Chavez MD   Comparison: compared with previous ECG   Similar to previous ECG  Rhythm: sinus rhythm    Clinical impression: normal ECG                Objective:      Vitals:    04/15/20 0921 04/15/20 0923   BP: 120/78 130/80   BP Location: Right arm Left arm   Patient Position: Sitting Sitting   Pulse: 74    Weight: 71.7 kg (158 lb)    Height: 162.6 cm (64\")      Body mass index is 27.12 kg/m².    Physical Exam   Constitutional: She is oriented to person, place, and time. She appears well-developed and well-nourished. No distress.   HENT:   Head: Normocephalic and atraumatic.   Eyes: Conjunctivae are normal. No scleral icterus.   Neck: Neck supple. No JVD present. Carotid bruit is not present. No thyromegaly present.   Cardiovascular: Normal rate, regular rhythm, S1 normal, S2 normal and intact distal pulses.  No extrasystoles are present. PMI is not displaced. Exam reveals no gallop.   No murmur heard.  Pulses:       Carotid pulses are 2+ on the right side, and 2+ on the left side.       Radial pulses are 2+ on the right side, and 2+ on the left side.        Dorsalis pedis pulses are 2+ on the right side, and 2+ on the left side.        Posterior tibial pulses are 2+ on the right side, and 2+ on the " left side.   Pulmonary/Chest: Effort normal and breath sounds normal. No respiratory distress. She has no wheezes. She has no rhonchi. She has no rales. She exhibits no tenderness.   Abdominal: Soft. Bowel sounds are normal. She exhibits no distension, no abdominal bruit and no mass. There is no tenderness.   Musculoskeletal: She exhibits no edema or deformity.   Lymphadenopathy:     She has no cervical adenopathy.   Neurological: She is alert and oriented to person, place, and time. No cranial nerve deficit.   Skin: Skin is warm and dry. No rash noted. She is not diaphoretic. No cyanosis. No pallor. Nails show no clubbing.   Psychiatric: She has a normal mood and affect. Judgment normal.   Vitals reviewed.      Lab Review:       Assessment:      Diagnosis Plan   1. Other hyperlipidemia       1. Hyperlipidemia, continue Livalo.  She will recheck her labs in 1 month.  2. Family history of hypertension and atrial fibrillation.  3. Fibromyalgia.  4. Obesity and sedentary lifestyle.     Plan:       See Roberto Carlos Bray in 1 year, no medication changes made.  Recommended vascular screening which she will set up on her own.

## 2020-04-20 RX ORDER — GABAPENTIN 300 MG/1
CAPSULE ORAL
Qty: 60 CAPSULE | Refills: 1 | Status: SHIPPED | OUTPATIENT
Start: 2020-04-20 | End: 2020-06-26

## 2020-05-05 ENCOUNTER — OFFICE VISIT (OUTPATIENT)
Dept: FAMILY MEDICINE CLINIC | Facility: CLINIC | Age: 60
End: 2020-05-05

## 2020-05-05 VITALS
DIASTOLIC BLOOD PRESSURE: 74 MMHG | TEMPERATURE: 98.4 F | SYSTOLIC BLOOD PRESSURE: 122 MMHG | HEIGHT: 64 IN | OXYGEN SATURATION: 98 % | WEIGHT: 157 LBS | BODY MASS INDEX: 26.8 KG/M2 | HEART RATE: 73 BPM

## 2020-05-05 DIAGNOSIS — E78.49 OTHER HYPERLIPIDEMIA: Primary | ICD-10-CM

## 2020-05-05 DIAGNOSIS — F32.9 REACTIVE DEPRESSION: ICD-10-CM

## 2020-05-05 DIAGNOSIS — K21.9 GASTROESOPHAGEAL REFLUX DISEASE WITHOUT ESOPHAGITIS: ICD-10-CM

## 2020-05-05 LAB
ALBUMIN SERPL-MCNC: 4.5 G/DL (ref 3.5–5.2)
ALBUMIN/GLOB SERPL: 1.7 G/DL
ALP SERPL-CCNC: 81 U/L (ref 39–117)
ALT SERPL-CCNC: 12 U/L (ref 1–33)
AST SERPL-CCNC: 16 U/L (ref 1–32)
BASOPHILS # BLD AUTO: 0.06 10*3/MM3 (ref 0–0.2)
BASOPHILS NFR BLD AUTO: 1.3 % (ref 0–1.5)
BILIRUB SERPL-MCNC: 0.2 MG/DL (ref 0.2–1.2)
BUN SERPL-MCNC: 9 MG/DL (ref 6–20)
BUN/CREAT SERPL: 12.5 (ref 7–25)
CALCIUM SERPL-MCNC: 9.8 MG/DL (ref 8.6–10.5)
CHLORIDE SERPL-SCNC: 100 MMOL/L (ref 98–107)
CHOLEST SERPL-MCNC: 227 MG/DL (ref 0–200)
CHOLEST/HDLC SERPL: 2.55 {RATIO}
CK SERPL-CCNC: 74 U/L (ref 20–180)
CO2 SERPL-SCNC: 29.4 MMOL/L (ref 22–29)
CREAT SERPL-MCNC: 0.72 MG/DL (ref 0.57–1)
EOSINOPHIL # BLD AUTO: 0.24 10*3/MM3 (ref 0–0.4)
EOSINOPHIL NFR BLD AUTO: 5.4 % (ref 0.3–6.2)
ERYTHROCYTE [DISTWIDTH] IN BLOOD BY AUTOMATED COUNT: 13.5 % (ref 12.3–15.4)
GLOBULIN SER CALC-MCNC: 2.6 GM/DL
GLUCOSE SERPL-MCNC: 108 MG/DL (ref 65–99)
HCT VFR BLD AUTO: 41.8 % (ref 34–46.6)
HDLC SERPL-MCNC: 89 MG/DL (ref 40–60)
HGB BLD-MCNC: 13.7 G/DL (ref 12–15.9)
IMM GRANULOCYTES # BLD AUTO: 0.02 10*3/MM3 (ref 0–0.05)
IMM GRANULOCYTES NFR BLD AUTO: 0.4 % (ref 0–0.5)
LDLC SERPL CALC-MCNC: 122 MG/DL (ref 0–100)
LYMPHOCYTES # BLD AUTO: 1.45 10*3/MM3 (ref 0.7–3.1)
LYMPHOCYTES NFR BLD AUTO: 32.6 % (ref 19.6–45.3)
MCH RBC QN AUTO: 28.2 PG (ref 26.6–33)
MCHC RBC AUTO-ENTMCNC: 32.8 G/DL (ref 31.5–35.7)
MCV RBC AUTO: 86 FL (ref 79–97)
MONOCYTES # BLD AUTO: 0.47 10*3/MM3 (ref 0.1–0.9)
MONOCYTES NFR BLD AUTO: 10.6 % (ref 5–12)
NEUTROPHILS # BLD AUTO: 2.21 10*3/MM3 (ref 1.7–7)
NEUTROPHILS NFR BLD AUTO: 49.7 % (ref 42.7–76)
NRBC BLD AUTO-RTO: 0 /100 WBC (ref 0–0.2)
PLATELET # BLD AUTO: 359 10*3/MM3 (ref 140–450)
POTASSIUM SERPL-SCNC: 5 MMOL/L (ref 3.5–5.2)
PROT SERPL-MCNC: 7.1 G/DL (ref 6–8.5)
RBC # BLD AUTO: 4.86 10*6/MM3 (ref 3.77–5.28)
SODIUM SERPL-SCNC: 139 MMOL/L (ref 136–145)
TRIGL SERPL-MCNC: 79 MG/DL (ref 0–150)
TSH SERPL DL<=0.005 MIU/L-ACNC: 2.83 UIU/ML (ref 0.27–4.2)
VLDLC SERPL CALC-MCNC: 15.8 MG/DL (ref 5–40)
WBC # BLD AUTO: 4.45 10*3/MM3 (ref 3.4–10.8)

## 2020-05-05 PROCEDURE — 99214 OFFICE O/P EST MOD 30 MIN: CPT | Performed by: INTERNAL MEDICINE

## 2020-05-05 RX ORDER — DULOXETIN HYDROCHLORIDE 30 MG/1
30 CAPSULE, DELAYED RELEASE ORAL DAILY
Qty: 30 CAPSULE | Refills: 3 | Status: SHIPPED | OUTPATIENT
Start: 2020-05-05 | End: 2020-09-09

## 2020-05-05 RX ORDER — DULOXETIN HYDROCHLORIDE 60 MG/1
60 CAPSULE, DELAYED RELEASE ORAL DAILY
Qty: 30 CAPSULE | Refills: 5 | Status: SHIPPED | OUTPATIENT
Start: 2020-05-05 | End: 2021-03-03 | Stop reason: SDUPTHER

## 2020-05-05 NOTE — PROGRESS NOTES
MILVIA@  Esther Duke is a 59 y.o. female.     Chief Complaint   Patient presents with   • Depression   • Hyperlipidemia   • Heartburn   Fibromyalgia    History of Present Illness   Patient here follow-up for depression, hyperlipidemia fibromyalgia.  She is off Protonix now.  She is doing well with the Livalo no side effects she is dieting and exercising.  Fibromyalgia is under control.  However patient reports she is more depressed.  Having trouble with coping.  Parents are getting .  No HI or SI.  She has seen a therapist before she can call her.  She continues to take Livalo, Cymbalta, Neurontin.  The following portions of the patient's history were reviewed and updated as appropriate: allergies, current medications, past family history, past medical history, past social history, past surgical history and problem list.    Review of Systems   Constitutional: Negative for activity change, appetite change, fatigue and fever.   Eyes: Negative for blurred vision and double vision.   Respiratory: Negative.  Negative for shortness of breath.    Cardiovascular: Negative for chest pain, palpitations and leg swelling.   Gastrointestinal: Negative.    Musculoskeletal: Negative for arthralgias.   Neurological: Negative for dizziness, syncope, light-headedness and headache.   Psychiatric/Behavioral: Positive for depressed mood. Negative for agitation, behavioral problems, self-injury and suicidal ideas. The patient is nervous/anxious.        Allergies   Allergen Reactions   • Atorvastatin Myalgia   • Codeine GI Intolerance   • Crestor [Rosuvastatin Calcium] Myalgia   • Simvastatin Myalgia   • Sulfa Antibiotics Hives       Current Outpatient Medications on File Prior to Visit   Medication Sig Dispense Refill   • gabapentin (NEURONTIN) 300 MG capsule TAKE ONE CAPSULE BY MOUTH TWICE A DAY 60 capsule 1   • pitavastatin calcium (LIVALO) 2 MG tablet tablet Take 1 tablet by mouth Every Night. 30 tablet 3   •  "RESTASIS 0.05 % ophthalmic emulsion      • senna (SENOKOT) 8.6 MG tablet tablet Take 1 tablet by mouth Every Night.     • [DISCONTINUED] DULoxetine (CYMBALTA) 60 MG capsule TAKE ONE CAPSULE BY MOUTH DAILY 30 capsule 5   • estradiol (ESTRACE) 0.1 MG/GM vaginal cream Insert 1 g into the vagina 2 (Two) Times a Week.     • pantoprazole (PROTONIX) 40 MG EC tablet Take 1 tablet by mouth Daily. 90 tablet 3     No current facility-administered medications on file prior to visit.        Family History   Problem Relation Age of Onset   • Hypertension Mother    • Hyperlipidemia Mother    • Hypertension Father    • Hyperlipidemia Father    • Stroke Father    • Cancer Sister    • Aneurysm Maternal Uncle    • Heart disease Maternal Grandmother    • Colon cancer Neg Hx    • Colon polyps Neg Hx        Past Medical History:   Diagnosis Date   • Fibromyalgia    • Gastroesophageal reflux disease without esophagitis    • Hernia, hiatal    • High cholesterol    • IBS (irritable bowel syndrome)    • Other hyperlipidemia    • Reactive depression        Past Surgical History:   Procedure Laterality Date   • COLONOSCOPY         Social History     Socioeconomic History   • Marital status: Unknown     Spouse name: Not on file   • Number of children: Not on file   • Years of education: Not on file   • Highest education level: Not on file   Tobacco Use   • Smoking status: Never Smoker   • Smokeless tobacco: Never Used   • Tobacco comment: caffeine use   Substance and Sexual Activity   • Alcohol use: No   • Drug use: No   • Sexual activity: Defer       Patient Active Problem List   Diagnosis   • Fibromyalgia   • Other hyperlipidemia   • Reactive depression   • Gastroesophageal reflux disease without esophagitis       /74   Pulse 73   Temp 98.4 °F (36.9 °C) (Oral)   Ht 162.6 cm (64\")   Wt 71.2 kg (157 lb)   SpO2 98%   BMI 26.95 kg/m²   Body mass index is 26.95 kg/m².    Objective   Physical Exam   Constitutional: She is oriented to " person, place, and time. She appears well-developed.   Eyes: Pupils are equal, round, and reactive to light.   Neck: Normal range of motion. Neck supple. No JVD present. No tracheal deviation present. No thyromegaly present.   Cardiovascular: Normal rate, regular rhythm and intact distal pulses.   No murmur heard.  Pulmonary/Chest: Effort normal and breath sounds normal. No respiratory distress. She has no wheezes.   Abdominal: Soft. Bowel sounds are normal. She exhibits no distension and no mass. There is no tenderness. There is no rebound and no guarding. No hernia.   Musculoskeletal: She exhibits no edema.   Lymphadenopathy:     She has no cervical adenopathy.   Neurological: She is alert and oriented to person, place, and time. She displays normal reflexes. No cranial nerve deficit or sensory deficit. She exhibits normal muscle tone. Coordination normal.   Skin: Skin is warm and dry.   Psychiatric: Her behavior is normal. Judgment and thought content normal.   Flat affect   Nursing note and vitals reviewed.        Assessment/Plan   Esther was seen today for depression, hyperlipidemia and heartburn.    Diagnoses and all orders for this visit:    Other hyperlipidemia  -     Comprehensive Metabolic Panel  -     CK  -     Lipid Panel With / Chol / HDL Ratio  -     TSH  -     CBC & Differential    Gastroesophageal reflux disease without esophagitis  -     Comprehensive Metabolic Panel  -     CK  -     Lipid Panel With / Chol / HDL Ratio  -     TSH  -     CBC & Differential    Reactive depression  -     Comprehensive Metabolic Panel  -     CK  -     Lipid Panel With / Chol / HDL Ratio  -     TSH  -     CBC & Differential    Other orders  -     DULoxetine (Cymbalta) 30 MG capsule; Take 1 capsule by mouth Daily.  -     DULoxetine (CYMBALTA) 60 MG capsule; Take 1 capsule by mouth Daily.    Increase Cymbalta to 90 mg daily.  Patient to call her therapist.  If symptoms continue she may need to see a psychiatrist.  Continue  Livalo.  She is also on Neurontin.  Rest of her problems are chronic and stable.  Return in 3 months but feels worse before.  Continue Livalo.  Diet and exercise.

## 2020-06-19 DIAGNOSIS — M79.7 FIBROMYALGIA: ICD-10-CM

## 2020-06-22 RX ORDER — PITAVASTATIN CALCIUM 2.09 MG/1
TABLET, FILM COATED ORAL
Qty: 30 TABLET | Refills: 11 | Status: SHIPPED | OUTPATIENT
Start: 2020-06-22 | End: 2021-06-29

## 2020-06-26 RX ORDER — GABAPENTIN 300 MG/1
CAPSULE ORAL
Qty: 60 CAPSULE | Refills: 3 | Status: SHIPPED | OUTPATIENT
Start: 2020-06-26 | End: 2020-12-28

## 2020-08-11 ENCOUNTER — OFFICE VISIT (OUTPATIENT)
Dept: FAMILY MEDICINE CLINIC | Facility: CLINIC | Age: 60
End: 2020-08-11

## 2020-08-11 VITALS
WEIGHT: 155 LBS | TEMPERATURE: 96.9 F | SYSTOLIC BLOOD PRESSURE: 120 MMHG | BODY MASS INDEX: 26.46 KG/M2 | DIASTOLIC BLOOD PRESSURE: 72 MMHG | HEIGHT: 64 IN | OXYGEN SATURATION: 98 % | RESPIRATION RATE: 14 BRPM | HEART RATE: 75 BPM

## 2020-08-11 DIAGNOSIS — K21.9 GASTROESOPHAGEAL REFLUX DISEASE WITHOUT ESOPHAGITIS: ICD-10-CM

## 2020-08-11 DIAGNOSIS — M79.7 FIBROMYALGIA: ICD-10-CM

## 2020-08-11 DIAGNOSIS — E78.49 OTHER HYPERLIPIDEMIA: Primary | ICD-10-CM

## 2020-08-11 DIAGNOSIS — F32.9 REACTIVE DEPRESSION: ICD-10-CM

## 2020-08-11 PROCEDURE — 99213 OFFICE O/P EST LOW 20 MIN: CPT | Performed by: INTERNAL MEDICINE

## 2020-08-11 NOTE — PROGRESS NOTES
MARYANAGIN@  Esther Duke is a 60 y.o. female.     Chief Complaint   Patient presents with   • Hyperlipidemia   • Heartburn   • Depression   • Fibromyalgia       History of Present Illness   Here follow-up for hyperlipidemia, heartburn, depression, fibromyalgia.  Denies any chest pain or shortness of breath.  Checking her blood pressure been normal.  Dieting and exercising.  She is taking Cymbalta 90, Neurontin, Protonix Protonix she is taking on and off.  Her symptoms are not being under control.  Neurontin is helping her fibromyalgia.  Depression is under control with increasing Cymbalta to 90.  No SI or HI.  The following portions of the patient's history were reviewed and updated as appropriate: allergies, current medications, past family history, past medical history, past social history, past surgical history and problem list.    Review of Systems   Constitutional: Negative for activity change, appetite change, fatigue and fever.   Eyes: Negative for blurred vision and double vision.   Respiratory: Negative.  Negative for shortness of breath.    Cardiovascular: Negative for chest pain, palpitations and leg swelling.   Gastrointestinal: Negative.    Neurological: Negative for dizziness, syncope, light-headedness and headache.   Psychiatric/Behavioral: Negative for dysphoric mood, suicidal ideas and depressed mood. The patient is not nervous/anxious.        Allergies   Allergen Reactions   • Atorvastatin Myalgia   • Codeine GI Intolerance   • Crestor [Rosuvastatin Calcium] Myalgia   • Simvastatin Myalgia   • Sulfa Antibiotics Hives       Current Outpatient Medications on File Prior to Visit   Medication Sig Dispense Refill   • DULoxetine (Cymbalta) 30 MG capsule Take 1 capsule by mouth Daily. 30 capsule 3   • DULoxetine (CYMBALTA) 60 MG capsule Take 1 capsule by mouth Daily. 30 capsule 5   • estradiol (ESTRACE) 0.1 MG/GM vaginal cream Insert 1 g into the vagina 2 (Two) Times a Week.     • gabapentin  "(NEURONTIN) 300 MG capsule TAKE ONE CAPSULE BY MOUTH TWICE A DAY 60 capsule 3   • LIVALO 2 MG tablet tablet TAKE ONE TABLET BY MOUTH ONCE NIGHTLY 30 tablet 11   • RESTASIS 0.05 % ophthalmic emulsion      • senna (SENOKOT) 8.6 MG tablet tablet Take 1 tablet by mouth Every Night.     • pantoprazole (PROTONIX) 40 MG EC tablet Take 1 tablet by mouth Daily. 90 tablet 3     No current facility-administered medications on file prior to visit.        Family History   Problem Relation Age of Onset   • Hypertension Mother    • Hyperlipidemia Mother    • Hypertension Father    • Hyperlipidemia Father    • Stroke Father    • Cancer Sister    • Aneurysm Maternal Uncle    • Heart disease Maternal Grandmother    • Colon cancer Neg Hx    • Colon polyps Neg Hx        Past Medical History:   Diagnosis Date   • Fibromyalgia    • Gastroesophageal reflux disease without esophagitis    • Hernia, hiatal    • High cholesterol    • IBS (irritable bowel syndrome)    • Other hyperlipidemia    • Reactive depression        Past Surgical History:   Procedure Laterality Date   • COLONOSCOPY         Social History     Socioeconomic History   • Marital status: Unknown     Spouse name: Not on file   • Number of children: Not on file   • Years of education: Not on file   • Highest education level: Not on file   Tobacco Use   • Smoking status: Never Smoker   • Smokeless tobacco: Never Used   • Tobacco comment: caffeine use   Substance and Sexual Activity   • Alcohol use: No   • Drug use: No   • Sexual activity: Defer       Patient Active Problem List   Diagnosis   • Fibromyalgia   • Other hyperlipidemia   • Reactive depression   • Gastroesophageal reflux disease without esophagitis       /72 (BP Location: Right arm, Patient Position: Sitting, Cuff Size: Adult)   Pulse 75   Temp 96.9 °F (36.1 °C) (Temporal)   Resp 14   Ht 162.6 cm (64\")   Wt 70.3 kg (155 lb)   SpO2 98%   BMI 26.61 kg/m²   Body mass index is 26.61 kg/m².    Objective "   Physical Exam   Constitutional: She is oriented to person, place, and time. She appears well-developed.   Eyes: Pupils are equal, round, and reactive to light. EOM are normal.   Neck: Normal range of motion. Neck supple. No JVD present. No tracheal deviation present. No thyromegaly present.   Cardiovascular: Normal rate, regular rhythm and intact distal pulses.   No murmur heard.  Pulmonary/Chest: Effort normal and breath sounds normal. No respiratory distress. She has no wheezes.   Abdominal: Soft. Bowel sounds are normal. She exhibits no distension and no mass. There is no tenderness. There is no rebound and no guarding.   Musculoskeletal: She exhibits no edema.   Lymphadenopathy:     She has no cervical adenopathy.   Neurological: She is alert and oriented to person, place, and time. She displays normal reflexes. No cranial nerve deficit or sensory deficit. She exhibits normal muscle tone. Coordination normal.   Psychiatric: She has a normal mood and affect. Her behavior is normal.   Nursing note and vitals reviewed.        Assessment/Plan   Esther was seen today for hyperlipidemia, heartburn, depression and fibromyalgia.    Diagnoses and all orders for this visit:    Other hyperlipidemia  -     Comprehensive metabolic panel  -     Lipid Panel w/ Chol/HDL Ratio  -     Hepatitis C antibody    Gastroesophageal reflux disease without esophagitis  -     Comprehensive metabolic panel  -     Lipid Panel w/ Chol/HDL Ratio  -     Hepatitis C antibody    Fibromyalgia  -     Comprehensive metabolic panel  -     Lipid Panel w/ Chol/HDL Ratio  -     Hepatitis C antibody    Reactive depression  -     Comprehensive metabolic panel  -     Lipid Panel w/ Chol/HDL Ratio  -     Hepatitis C antibody    Discussed with patient diet and exercise.  Check blood pressure.  Continue Cymbalta, Neurontin.  She does not need Neurontin refill today.  All her problems are chronic and stable at this time.  Return in 3 months time.

## 2020-08-12 LAB
ALBUMIN SERPL-MCNC: 4.5 G/DL (ref 3.5–5.2)
ALBUMIN/GLOB SERPL: 2.8 G/DL
ALP SERPL-CCNC: 77 U/L (ref 39–117)
ALT SERPL-CCNC: 11 U/L (ref 1–33)
AST SERPL-CCNC: 13 U/L (ref 1–32)
BILIRUB SERPL-MCNC: 0.2 MG/DL (ref 0–1.2)
BUN SERPL-MCNC: 8 MG/DL (ref 6–20)
BUN/CREAT SERPL: 11.8 (ref 7–25)
CALCIUM SERPL-MCNC: 9.2 MG/DL (ref 8.6–10.5)
CHLORIDE SERPL-SCNC: 100 MMOL/L (ref 98–107)
CHOLEST SERPL-MCNC: 226 MG/DL (ref 0–200)
CHOLEST/HDLC SERPL: 2.86 {RATIO}
CO2 SERPL-SCNC: 28.9 MMOL/L (ref 22–29)
CREAT SERPL-MCNC: 0.68 MG/DL (ref 0.57–1)
GLOBULIN SER CALC-MCNC: 1.6 GM/DL
GLUCOSE SERPL-MCNC: 101 MG/DL (ref 65–99)
HCV AB S/CO SERPL IA: <0.1 S/CO RATIO (ref 0–0.9)
HDLC SERPL-MCNC: 79 MG/DL (ref 40–60)
LDLC SERPL CALC-MCNC: 130 MG/DL (ref 0–100)
POTASSIUM SERPL-SCNC: 4.9 MMOL/L (ref 3.5–5.2)
PROT SERPL-MCNC: 6.1 G/DL (ref 6–8.5)
SODIUM SERPL-SCNC: 138 MMOL/L (ref 136–145)
TRIGL SERPL-MCNC: 84 MG/DL (ref 0–150)
VLDLC SERPL CALC-MCNC: 16.8 MG/DL

## 2020-09-09 RX ORDER — DULOXETIN HYDROCHLORIDE 30 MG/1
CAPSULE, DELAYED RELEASE ORAL
Qty: 30 CAPSULE | Refills: 5 | Status: SHIPPED | OUTPATIENT
Start: 2020-09-09 | End: 2021-03-08

## 2020-12-02 ENCOUNTER — OFFICE VISIT (OUTPATIENT)
Dept: FAMILY MEDICINE CLINIC | Facility: CLINIC | Age: 60
End: 2020-12-02

## 2020-12-02 VITALS
TEMPERATURE: 96.9 F | SYSTOLIC BLOOD PRESSURE: 118 MMHG | WEIGHT: 156 LBS | OXYGEN SATURATION: 98 % | BODY MASS INDEX: 26.63 KG/M2 | HEART RATE: 68 BPM | HEIGHT: 64 IN | DIASTOLIC BLOOD PRESSURE: 68 MMHG

## 2020-12-02 DIAGNOSIS — E55.9 VITAMIN D DEFICIENCY: ICD-10-CM

## 2020-12-02 DIAGNOSIS — M79.7 FIBROMYALGIA: ICD-10-CM

## 2020-12-02 DIAGNOSIS — M25.511 ACUTE PAIN OF RIGHT SHOULDER: ICD-10-CM

## 2020-12-02 DIAGNOSIS — K21.9 GASTROESOPHAGEAL REFLUX DISEASE WITHOUT ESOPHAGITIS: ICD-10-CM

## 2020-12-02 DIAGNOSIS — E78.49 OTHER HYPERLIPIDEMIA: Primary | ICD-10-CM

## 2020-12-02 DIAGNOSIS — F32.9 REACTIVE DEPRESSION: ICD-10-CM

## 2020-12-02 PROCEDURE — 99214 OFFICE O/P EST MOD 30 MIN: CPT | Performed by: INTERNAL MEDICINE

## 2020-12-02 RX ORDER — LIFITEGRAST 50 MG/ML
SOLUTION/ DROPS OPHTHALMIC
COMMUNITY
Start: 2020-10-08

## 2020-12-02 NOTE — PROGRESS NOTES
MILVIA@  Esther Duke is a 60 y.o. female.     Chief Complaint   Patient presents with   • Hyperlipidemia   • GI Problem   • Fibromyalgia   • Depression       History of Present Illness   Patient here follow-up for hyperlipidemia, fibromyalgia, depression, GERD.  Reports taking Protonix as needed is helping her GERD symptoms.  Taking Livalo, Cymbalta, Neurontin.  Cymbalta is helping her depression also fibromyalgia.  Taking Neurontin for her fibromyalgia.  Denies any chest pain or shortness of breath.  Had COVID-19 positive early November.  She had a repeat one done that was negative.  No further symptoms.  Has on and off right shoulder pain with decreased mobility.  No injury.  No chest pain or shortness of breath.  The following portions of the patient's history were reviewed and updated as appropriate: allergies, current medications, past family history, past medical history, past social history, past surgical history and problem list.    Review of Systems   Constitutional: Negative for activity change, appetite change, fatigue and fever.   Eyes: Negative for blurred vision and double vision.   Respiratory: Negative.  Negative for shortness of breath.    Cardiovascular: Negative for chest pain, palpitations and leg swelling.   Gastrointestinal: Negative.    Endocrine: Negative.    Genitourinary: Negative for dysuria and hematuria.   Musculoskeletal: Positive for arthralgias.   Neurological: Negative for dizziness, syncope, light-headedness and headache.   Psychiatric/Behavioral: Negative for agitation, suicidal ideas and depressed mood. The patient is not nervous/anxious.        Allergies   Allergen Reactions   • Atorvastatin Myalgia   • Codeine GI Intolerance   • Crestor [Rosuvastatin Calcium] Myalgia   • Simvastatin Myalgia   • Sulfa Antibiotics Hives       Current Outpatient Medications on File Prior to Visit   Medication Sig Dispense Refill   • DULoxetine (CYMBALTA) 30 MG capsule TAKE ONE CAPSULE  BY MOUTH DAILY 30 capsule 5   • DULoxetine (CYMBALTA) 60 MG capsule Take 1 capsule by mouth Daily. 30 capsule 5   • gabapentin (NEURONTIN) 300 MG capsule TAKE ONE CAPSULE BY MOUTH TWICE A DAY 60 capsule 3   • LIVALO 2 MG tablet tablet TAKE ONE TABLET BY MOUTH ONCE NIGHTLY 30 tablet 11   • pantoprazole (PROTONIX) 40 MG EC tablet Take 1 tablet by mouth Daily. 90 tablet 3   • senna (SENOKOT) 8.6 MG tablet tablet Take 1 tablet by mouth Every Night.     • estradiol (ESTRACE) 0.1 MG/GM vaginal cream Insert 1 g into the vagina 2 (Two) Times a Week.     • RESTASIS 0.05 % ophthalmic emulsion        No current facility-administered medications on file prior to visit.        Family History   Problem Relation Age of Onset   • Hypertension Mother    • Hyperlipidemia Mother    • Hypertension Father    • Hyperlipidemia Father    • Stroke Father    • Cancer Sister    • Aneurysm Maternal Uncle    • Heart disease Maternal Grandmother    • Colon cancer Neg Hx    • Colon polyps Neg Hx        Past Medical History:   Diagnosis Date   • Fibromyalgia    • Gastroesophageal reflux disease without esophagitis    • Hernia, hiatal    • High cholesterol    • IBS (irritable bowel syndrome)    • Other hyperlipidemia    • Reactive depression        Past Surgical History:   Procedure Laterality Date   • COLONOSCOPY         Social History     Socioeconomic History   • Marital status: Unknown     Spouse name: Not on file   • Number of children: Not on file   • Years of education: Not on file   • Highest education level: Not on file   Tobacco Use   • Smoking status: Never Smoker   • Smokeless tobacco: Never Used   • Tobacco comment: caffeine use   Substance and Sexual Activity   • Alcohol use: No   • Drug use: No   • Sexual activity: Defer       Patient Active Problem List   Diagnosis   • Fibromyalgia   • Other hyperlipidemia   • Reactive depression   • Gastroesophageal reflux disease without esophagitis       /68 (BP Location: Left arm, Patient  "Position: Sitting, Cuff Size: Adult)   Pulse 68   Temp 96.9 °F (36.1 °C) (Temporal)   Ht 162.6 cm (64\")   Wt 70.8 kg (156 lb)   SpO2 98%   BMI 26.78 kg/m²   Body mass index is 26.78 kg/m².    Objective   Physical Exam  Constitutional:       Appearance: She is well-developed.   Eyes:      Pupils: Pupils are equal, round, and reactive to light.   Neck:      Musculoskeletal: Normal range of motion and neck supple.      Thyroid: No thyromegaly.      Vascular: No JVD.      Trachea: No tracheal deviation.   Cardiovascular:      Rate and Rhythm: Normal rate and regular rhythm.      Heart sounds: No murmur.   Pulmonary:      Effort: Pulmonary effort is normal. No respiratory distress.      Breath sounds: Normal breath sounds. No wheezing.   Abdominal:      General: Bowel sounds are normal. There is no distension.      Palpations: Abdomen is soft.      Tenderness: There is no abdominal tenderness. There is no right CVA tenderness, left CVA tenderness, guarding or rebound.      Hernia: No hernia is present.   Lymphadenopathy:      Cervical: No cervical adenopathy.   Neurological:      General: No focal deficit present.      Mental Status: She is alert and oriented to person, place, and time. Mental status is at baseline.      Cranial Nerves: No cranial nerve deficit.      Sensory: No sensory deficit.      Motor: No weakness.      Coordination: Coordination normal.      Gait: Gait normal.   Psychiatric:         Mood and Affect: Mood normal.         Behavior: Behavior normal.           Assessment/Plan   Diagnoses and all orders for this visit:    1. Other hyperlipidemia (Primary)  -     CBC & Differential  -     Comprehensive Metabolic Panel  -     CK  -     Lipid Panel With / Chol / HDL Ratio  -     TSH  -     Vitamin B12    2. Gastroesophageal reflux disease without esophagitis  -     CBC & Differential  -     Comprehensive Metabolic Panel  -     CK  -     Lipid Panel With / Chol / HDL Ratio  -     TSH    3. " Fibromyalgia  -     CBC & Differential  -     Comprehensive Metabolic Panel  -     CK  -     Lipid Panel With / Chol / HDL Ratio  -     TSH    4. Reactive depression  -     CBC & Differential  -     Comprehensive Metabolic Panel  -     CK  -     Lipid Panel With / Chol / HDL Ratio  -     TSH  -     Vitamin B12    5. Vitamin D deficiency  -     Vitamin D 25 Hydroxy    6. Acute pain of right shoulder    Discussed with patient if right shoulder pain continues call will refer to orthopedics.  Instructor for her to exercise stretch her shoulder.  Continue Livalo, Neurontin, Cymbalta.  She is on over-the-counter vitamin D.  Taking Protonix as needed.  Continue diet and exercise.  Return in 3 months time.  All her rest of the problems are chronic and stable.  Depression is also under control with the current medications.  She wants to make a appointment for annual physical next visit it can be changed to annual physical.

## 2020-12-03 LAB
25(OH)D3+25(OH)D2 SERPL-MCNC: 24.6 NG/ML (ref 30–100)
ALBUMIN SERPL-MCNC: 4.4 G/DL (ref 3.5–5.2)
ALBUMIN/GLOB SERPL: 1.8 G/DL
ALP SERPL-CCNC: 85 U/L (ref 39–117)
ALT SERPL-CCNC: 13 U/L (ref 1–33)
AST SERPL-CCNC: 10 U/L (ref 1–32)
BASOPHILS # BLD AUTO: 0.03 10*3/MM3 (ref 0–0.2)
BASOPHILS NFR BLD AUTO: 0.7 % (ref 0–1.5)
BILIRUB SERPL-MCNC: 0.2 MG/DL (ref 0–1.2)
BUN SERPL-MCNC: 9 MG/DL (ref 6–20)
BUN/CREAT SERPL: 12.3 (ref 7–25)
CALCIUM SERPL-MCNC: 9.5 MG/DL (ref 8.6–10.5)
CHLORIDE SERPL-SCNC: 100 MMOL/L (ref 98–107)
CHOLEST SERPL-MCNC: 219 MG/DL (ref 0–200)
CHOLEST/HDLC SERPL: 2.52 {RATIO}
CK SERPL-CCNC: 48 U/L (ref 20–180)
CO2 SERPL-SCNC: 30.7 MMOL/L (ref 22–29)
CREAT SERPL-MCNC: 0.73 MG/DL (ref 0.57–1)
EOSINOPHIL # BLD AUTO: 0.33 10*3/MM3 (ref 0–0.4)
EOSINOPHIL NFR BLD AUTO: 7.2 % (ref 0.3–6.2)
ERYTHROCYTE [DISTWIDTH] IN BLOOD BY AUTOMATED COUNT: 13.4 % (ref 12.3–15.4)
GLOBULIN SER CALC-MCNC: 2.4 GM/DL
GLUCOSE SERPL-MCNC: 97 MG/DL (ref 65–99)
HCT VFR BLD AUTO: 40.6 % (ref 34–46.6)
HDLC SERPL-MCNC: 87 MG/DL (ref 40–60)
HGB BLD-MCNC: 13.5 G/DL (ref 12–15.9)
IMM GRANULOCYTES # BLD AUTO: 0.01 10*3/MM3 (ref 0–0.05)
IMM GRANULOCYTES NFR BLD AUTO: 0.2 % (ref 0–0.5)
LDLC SERPL CALC-MCNC: 118 MG/DL (ref 0–100)
LYMPHOCYTES # BLD AUTO: 1.37 10*3/MM3 (ref 0.7–3.1)
LYMPHOCYTES NFR BLD AUTO: 29.8 % (ref 19.6–45.3)
MCH RBC QN AUTO: 28.5 PG (ref 26.6–33)
MCHC RBC AUTO-ENTMCNC: 33.3 G/DL (ref 31.5–35.7)
MCV RBC AUTO: 85.7 FL (ref 79–97)
MONOCYTES # BLD AUTO: 0.41 10*3/MM3 (ref 0.1–0.9)
MONOCYTES NFR BLD AUTO: 8.9 % (ref 5–12)
NEUTROPHILS # BLD AUTO: 2.45 10*3/MM3 (ref 1.7–7)
NEUTROPHILS NFR BLD AUTO: 53.2 % (ref 42.7–76)
NRBC BLD AUTO-RTO: 0 /100 WBC (ref 0–0.2)
PLATELET # BLD AUTO: 346 10*3/MM3 (ref 140–450)
POTASSIUM SERPL-SCNC: 4.6 MMOL/L (ref 3.5–5.2)
PROT SERPL-MCNC: 6.8 G/DL (ref 6–8.5)
RBC # BLD AUTO: 4.74 10*6/MM3 (ref 3.77–5.28)
SODIUM SERPL-SCNC: 139 MMOL/L (ref 136–145)
TRIGL SERPL-MCNC: 83 MG/DL (ref 0–150)
TSH SERPL DL<=0.005 MIU/L-ACNC: 2.45 UIU/ML (ref 0.27–4.2)
VIT B12 SERPL-MCNC: 616 PG/ML (ref 211–946)
VLDLC SERPL CALC-MCNC: 14 MG/DL (ref 5–40)
WBC # BLD AUTO: 4.6 10*3/MM3 (ref 3.4–10.8)

## 2020-12-08 RX ORDER — PANTOPRAZOLE SODIUM 40 MG/1
40 TABLET, DELAYED RELEASE ORAL DAILY
Qty: 90 TABLET | Refills: 3 | Status: SHIPPED | OUTPATIENT
Start: 2020-12-08 | End: 2020-12-09 | Stop reason: SDUPTHER

## 2020-12-10 RX ORDER — PANTOPRAZOLE SODIUM 40 MG/1
40 TABLET, DELAYED RELEASE ORAL DAILY
Qty: 90 TABLET | Refills: 3 | Status: SHIPPED | OUTPATIENT
Start: 2020-12-10 | End: 2020-12-30

## 2020-12-26 DIAGNOSIS — M79.7 FIBROMYALGIA: ICD-10-CM

## 2020-12-28 RX ORDER — GABAPENTIN 300 MG/1
CAPSULE ORAL
Qty: 60 CAPSULE | Refills: 2 | Status: SHIPPED | OUTPATIENT
Start: 2020-12-28 | End: 2021-03-09

## 2020-12-30 ENCOUNTER — OFFICE VISIT (OUTPATIENT)
Dept: GASTROENTEROLOGY | Facility: CLINIC | Age: 60
End: 2020-12-30

## 2020-12-30 VITALS — HEIGHT: 64 IN | TEMPERATURE: 98.2 F | BODY MASS INDEX: 27.18 KG/M2 | WEIGHT: 159.2 LBS

## 2020-12-30 DIAGNOSIS — R19.4 CHANGE IN BOWEL HABITS: ICD-10-CM

## 2020-12-30 DIAGNOSIS — R14.0 BLOATING: Primary | ICD-10-CM

## 2020-12-30 DIAGNOSIS — K58.1 IRRITABLE BOWEL SYNDROME WITH CONSTIPATION: ICD-10-CM

## 2020-12-30 DIAGNOSIS — R10.30 LOWER ABDOMINAL PAIN: ICD-10-CM

## 2020-12-30 DIAGNOSIS — K21.00 GASTROESOPHAGEAL REFLUX DISEASE WITH ESOPHAGITIS WITHOUT HEMORRHAGE: ICD-10-CM

## 2020-12-30 DIAGNOSIS — R93.5 ABNORMAL CT OF THE ABDOMEN: ICD-10-CM

## 2020-12-30 DIAGNOSIS — R09.89 GLOBUS SENSATION: ICD-10-CM

## 2020-12-30 DIAGNOSIS — R10.12 LEFT UPPER QUADRANT PAIN: ICD-10-CM

## 2020-12-30 DIAGNOSIS — K59.00 CONSTIPATION, UNSPECIFIED CONSTIPATION TYPE: ICD-10-CM

## 2020-12-30 PROCEDURE — 99214 OFFICE O/P EST MOD 30 MIN: CPT | Performed by: NURSE PRACTITIONER

## 2020-12-30 RX ORDER — PANTOPRAZOLE SODIUM 40 MG/1
40 TABLET, DELAYED RELEASE ORAL 2 TIMES DAILY
Qty: 180 TABLET | Refills: 3 | Status: SHIPPED | OUTPATIENT
Start: 2020-12-30 | End: 2022-01-11

## 2020-12-30 NOTE — PATIENT INSTRUCTIONS
Magnesium supplement 400mg twice a day.  Discussed the importance of taking Omeprazole/Prilosec, Pantoprazole/Protonix, twice daily before breakfast and dinner permanently due to known risk with long term acid reflux of Almonte's esophagus/esophageal cancer.    Don't eat late, don't eat fried and don't eat too much at one time. Avoid tomato based products like pizza, lasagna, spagetti.  If you want to have these take an over the counter Pepcid immediately before you eat them.  Do not eat within 3-4 hrs of bedtime. Limit caffeine and carbonated beverages, if you must have them do not have later in the day.  If reflux is severe elevate the head of the bed. You may also use TUMS, maalox, or mylanta for breakthrough heartburn.    Take 2 daily probiotics (something with a billion and more different strains is better like probiotic 10 or probiotic gummies) for your gut as well.  AVOID taking NSAIDS (like ibuprofen, Aleve, Motrin, naproxen, meloxicam, etc) as much as possible and use acetaminophen (Tylenol) instead.    Drink lots of water, eat a high fiber diet with 25-30gm a day(check the fiber content in the foods you eat.  Protein bars with 15gm of fiber in each bar are a great supplement daily), and get regular exercise. Use over the counter simethicone xtra strength gas x (125-180mg) 2 twice a day as needed for gas.     High Fiber Food suggestions:    Beans, nuts, vegetables, and whole grains are high in fiber.    Amado Protein bars from [employer] = 15gm of fiber in each bar (also gluten free)  Quest Protein bars from grocery stores= 15gm of fiber each (also gluten free)  Fiber One bars from grocery stores = 5-6gm of fiber each  Kelloggs Bran Buds cereal 1/2 cup = 17gm of fiber  Kroger brand low sugar Craisins = 13gm of fiber per serving

## 2020-12-30 NOTE — PROGRESS NOTES
"    PATIENT INFORMATION  Esther Duke       - 1960    CHIEF COMPLAINT  Chief Complaint   Patient presents with   • Heartburn   • Gas       HISTORY OF PRESENT ILLNESS  Hb belching 3 days a week. Constipation is since she was 17 but recent infection with covid caused diarrhea.  Is taking daily senna and discussed that miralax.  Also struggles with fibromyalgia.        REVIEWED PERTINENT RESULTS/ LABS  Lab Results   Component Value Date    CASEREPORT  2019     Surgical Pathology Report                         Case: [id number]                                  Authorizing Provider:  Deanna Lorenzo MD         Collected:           2019 09:43 AM          Pathologist:           Marge Walker MD  Received:            05/15/2019 05:42 AM          Specimens:   1) - Gastric, epigastric                                                                            2) - Small Intestine, small bowel                                                                   3) - Gastric, gastric polyp                                                                         4) - Esophagus, Distal                                                                     FINALDX  2019     1.  Stomach, Biopsy:    A.  Chemical gastropathy.   B.  Negative for Helicobacter by routine staining.   C.  No metaplasia, dysplasia or malignancy identified.     2.  Small Bowel, Biopsy:  Benign small bowel mucosa with   A. Normal intact villous surface.   B. No significant inflammation, no granulomas.   C. No viral inclusions or other organisms on routinely stained sections.     3.  \"Gastric Polyp\", Biopsy:    A.  Fragment of gastric mucosa with chemical gastropathy.   B.  Benign fundic gland polyp.    C.  No metaplasia, dysplasia or malignancy identified.     4.  Distal Esophagus, Biopsy:    A.  Benign gastroesophageal junction mucosa with reflux esophagitis.   B.  Negative for goblet cell metaplasia and dysplasia.      swm/brb "        Lab Results   Component Value Date    HGB 13.5 12/02/2020    MCV 85.7 12/02/2020     12/02/2020    ALT 13 12/02/2020    AST 10 12/02/2020    TRIG 83 12/02/2020      No results found.    REVIEW OF SYSTEMS  Review of Systems   Gastrointestinal:        Reflux   All other systems reviewed and are negative.        ACTIVE PROBLEMS  Patient Active Problem List    Diagnosis   • Fibromyalgia [M79.7]   • Other hyperlipidemia [E78.49]   • Reactive depression [F32.9]   • Gastroesophageal reflux disease without esophagitis [K21.9]         PAST MEDICAL HISTORY  Past Medical History:   Diagnosis Date   • Fibromyalgia    • Gastroesophageal reflux disease without esophagitis    • Hernia, hiatal    • High cholesterol    • IBS (irritable bowel syndrome)    • Other hyperlipidemia    • Reactive depression          SURGICAL HISTORY  Past Surgical History:   Procedure Laterality Date   • COLONOSCOPY           FAMILY HISTORY  Family History   Problem Relation Age of Onset   • Hypertension Mother    • Hyperlipidemia Mother    • Hypertension Father    • Hyperlipidemia Father    • Stroke Father    • Cancer Sister    • Aneurysm Maternal Uncle    • Heart disease Maternal Grandmother    • Colon cancer Neg Hx    • Colon polyps Neg Hx          SOCIAL HISTORY  Social History     Occupational History   • Not on file   Tobacco Use   • Smoking status: Never Smoker   • Smokeless tobacco: Never Used   • Tobacco comment: caffeine use   Substance and Sexual Activity   • Alcohol use: No   • Drug use: No   • Sexual activity: Defer         CURRENT MEDICATIONS    Current Outpatient Medications:   •  DULoxetine (CYMBALTA) 30 MG capsule, TAKE ONE CAPSULE BY MOUTH DAILY, Disp: 30 capsule, Rfl: 5  •  DULoxetine (CYMBALTA) 60 MG capsule, Take 1 capsule by mouth Daily., Disp: 30 capsule, Rfl: 5  •  estradiol (ESTRACE) 0.1 MG/GM vaginal cream, Insert 1 g into the vagina 2 (Two) Times a Week., Disp: , Rfl:   •  gabapentin (NEURONTIN) 300 MG capsule,  "TAKE ONE CAPSULE BY MOUTH TWICE A DAY, Disp: 60 capsule, Rfl: 2  •  LIVALO 2 MG tablet tablet, TAKE ONE TABLET BY MOUTH ONCE NIGHTLY, Disp: 30 tablet, Rfl: 11  •  pantoprazole (PROTONIX) 40 MG EC tablet, Take 1 tablet by mouth 2 (Two) Times a Day., Disp: 180 tablet, Rfl: 3  •  RESTASIS 0.05 % ophthalmic emulsion, , Disp: , Rfl:   •  senna (SENOKOT) 8.6 MG tablet tablet, Take 1 tablet by mouth Every Night., Disp: , Rfl:   •  Xiidra 5 % ophthalmic solution, , Disp: , Rfl:     ALLERGIES  Atorvastatin, Codeine, Crestor [rosuvastatin calcium], Simvastatin, and Sulfa antibiotics    VITALS  Vitals:    12/30/20 0914   Temp: 98.2 °F (36.8 °C)   TempSrc: Temporal   Weight: 72.2 kg (159 lb 3.2 oz)   Height: 162.6 cm (64.02\")       PHYSICAL EXAM  Debilities/Disabilities Identified: None  Emotional Behavior: Appropriate  Wt Readings from Last 3 Encounters:   12/30/20 72.2 kg (159 lb 3.2 oz)   12/02/20 70.8 kg (156 lb)   08/11/20 70.3 kg (155 lb)     Ht Readings from Last 1 Encounters:   12/30/20 162.6 cm (64.02\")     Body mass index is 27.31 kg/m².  Physical Exam  Vitals signs and nursing note reviewed.   Constitutional:       Appearance: She is well-developed.   HENT:      Head: Normocephalic and atraumatic.   Eyes:      Conjunctiva/sclera: Conjunctivae normal.      Pupils: Pupils are equal, round, and reactive to light.   Neck:      Musculoskeletal: Normal range of motion and neck supple.   Cardiovascular:      Rate and Rhythm: Normal rate and regular rhythm.   Pulmonary:      Effort: Pulmonary effort is normal.      Breath sounds: Normal breath sounds.   Abdominal:      General: Bowel sounds are normal. There is no distension.      Palpations: Abdomen is soft.      Tenderness: There is abdominal tenderness in the right lower quadrant, epigastric area, left upper quadrant and left lower quadrant.   Musculoskeletal: Normal range of motion.   Lymphadenopathy:      Cervical: No cervical adenopathy.   Skin:     General: Skin is " warm and dry.   Neurological:      Mental Status: She is alert and oriented to person, place, and time.   Psychiatric:         Behavior: Behavior normal.         CLINICAL DATA REVIEWED   reviewed previous lab results and integrated with today's visit, reviewed notes from other physicians and/or last GI encounter, reviewed previous endoscopy results and available photos, reviewed surgical pathology results from previous biopsies    ASSESSMENT  Diagnoses and all orders for this visit:    Bloating  -     pantoprazole (PROTONIX) 40 MG EC tablet; Take 1 tablet by mouth 2 (Two) Times a Day.    Globus sensation    Left upper quadrant pain    Abnormal CT of the abdomen    Lower abdominal pain    Constipation, unspecified constipation type    Change in bowel habits    Irritable bowel syndrome with constipation    Gastroesophageal reflux disease with esophagitis without hemorrhage  -     pantoprazole (PROTONIX) 40 MG EC tablet; Take 1 tablet by mouth 2 (Two) Times a Day.          PLAN  Return in about 4 weeks (around 1/27/2021).     Magnesium supplement 400mg twice a day.  Discussed the importance of taking Omeprazole/Prilosec, Pantoprazole/Protonix, twice daily before breakfast and dinner permanently due to known risk with long term acid reflux of Almonte's esophagus/esophageal cancer.    Don't eat late, don't eat fried and don't eat too much at one time. Avoid tomato based products like pizza, lasagna, spagetti.  If you want to have these take an over the counter Pepcid immediately before you eat them.  Do not eat within 3-4 hrs of bedtime. Limit caffeine and carbonated beverages, if you must have them do not have later in the day.  If reflux is severe elevate the head of the bed. You may also use TUMS, maalox, or mylanta for breakthrough heartburn.    Take a daily probiotic (something with a billion and more different strains is better like probiotic 10 or probiotic gummies) for your gut as well.  AVOID taking NSAIDS (like  ibuprofen, Aleve, Motrin, naproxen, meloxicam, etc) as much as possible and use acetaminophen (Tylenol) instead.    Drink lots of water, eat a high fiber diet with 25-30gm a day(check the fiber content in the foods you eat.  Protein bars with 15gm of fiber in each bar are a great supplement daily), and get regular exercise. Use over the counter simethicone xtra strength gas x (125-180mg) 2 twice a day as needed for gas.     High Fiber Food suggestions:    Beans, nuts, vegetables are high in fiber.    Fischer Protein bars from [employer] = 15gm of fiber in each bar (also gluten free)  Quest Protein bars from grocery [employer]= 15gm of fiber each (also gluten free)  Fiber One bars from grocery stores = 5-6gm of fiber each  Kelloggs Bran Buds cereal 1/2 cup = 17gm of fiber  Kroger brand low sugar Craisins = 13gm of fiber per serving      I have discussed the above plan with the patient.  They verbalize understanding and are in agreement with the plan.  They have been advised to contact the office for any questions, concerns, or changes related to their health.    45 min spent with patient today >50% spent counseling about natural history and expected course of assessed complaint and reviewed treatment options that have been tried and not tried and those currently available. Questions answered.

## 2021-01-29 ENCOUNTER — OFFICE VISIT (OUTPATIENT)
Dept: GASTROENTEROLOGY | Facility: CLINIC | Age: 61
End: 2021-01-29

## 2021-01-29 VITALS — HEIGHT: 64 IN | BODY MASS INDEX: 26.7 KG/M2 | WEIGHT: 156.4 LBS

## 2021-01-29 DIAGNOSIS — R11.14 BILIOUS VOMITING WITH NAUSEA: ICD-10-CM

## 2021-01-29 DIAGNOSIS — K52.9 NONINFECTIOUS GASTROENTERITIS, UNSPECIFIED TYPE: ICD-10-CM

## 2021-01-29 DIAGNOSIS — K59.04 CHRONIC IDIOPATHIC CONSTIPATION: Primary | ICD-10-CM

## 2021-01-29 DIAGNOSIS — R10.13 EPIGASTRIC PAIN: ICD-10-CM

## 2021-01-29 PROCEDURE — 99214 OFFICE O/P EST MOD 30 MIN: CPT | Performed by: NURSE PRACTITIONER

## 2021-01-29 RX ORDER — ONDANSETRON 4 MG/1
4 TABLET, FILM COATED ORAL EVERY 4 HOURS PRN
Qty: 60 TABLET | Refills: 3 | Status: SHIPPED | OUTPATIENT
Start: 2021-01-29 | End: 2021-03-24

## 2021-01-29 NOTE — PROGRESS NOTES
"PATIENT INFORMATION  Esther Duke     - 1960    CHIEF COMPLAINT  Chief Complaint   Patient presents with   • Constipation     1 month f/u   • Heartburn   • Vomiting     X 2 week   • Diarrhea       HISTORY OF PRESENT ILLNESS  Has been throwing up and diarrhea for the last two weeks, sour. Vomiting is worse in the evening.  No blood or coffee ground appearance with emesis.  Prior to this illness constipation for her whole life. Taking 2 probiotics a day.  protonix 40 bid.  Magnesium 400mg bid.  With still some days of constipation.  With the illness has stopped her magnesium.  Was concerned about bowel blockage but is having.      She has also been under a lot of stress.          REVIEWED PERTINENT RESULTS/ LABS  Lab Results   Component Value Date    CASEREPORT  2019     Surgical Pathology Report                         Case: [id number]                                  Authorizing Provider:  Deanna Lorenzo MD         Collected:           2019 09:43 AM          Pathologist:           Marge Walker MD  Received:            05/15/2019 05:42 AM          Specimens:   1) - Gastric, epigastric                                                                            2) - Small Intestine, small bowel                                                                   3) - Gastric, gastric polyp                                                                         4) - Esophagus, Distal                                                                     FINALDX  2019     1.  Stomach, Biopsy:    A.  Chemical gastropathy.   B.  Negative for Helicobacter by routine staining.   C.  No metaplasia, dysplasia or malignancy identified.     2.  Small Bowel, Biopsy:  Benign small bowel mucosa with   A. Normal intact villous surface.   B. No significant inflammation, no granulomas.   C. No viral inclusions or other organisms on routinely stained sections.     3.  \"Gastric Polyp\", Biopsy:    A.  " Fragment of gastric mucosa with chemical gastropathy.   B.  Benign fundic gland polyp.    C.  No metaplasia, dysplasia or malignancy identified.     4.  Distal Esophagus, Biopsy:    A.  Benign gastroesophageal junction mucosa with reflux esophagitis.   B.  Negative for goblet cell metaplasia and dysplasia.      swm/brb        Lab Results   Component Value Date    HGB 13.5 12/02/2020    MCV 85.7 12/02/2020     12/02/2020    ALT 13 12/02/2020    AST 10 12/02/2020    TRIG 83 12/02/2020      No results found.    CURRENT MEDICATIONS    Current Outpatient Medications:   •  DULoxetine (CYMBALTA) 30 MG capsule, TAKE ONE CAPSULE BY MOUTH DAILY, Disp: 30 capsule, Rfl: 5  •  DULoxetine (CYMBALTA) 60 MG capsule, Take 1 capsule by mouth Daily., Disp: 30 capsule, Rfl: 5  •  gabapentin (NEURONTIN) 300 MG capsule, TAKE ONE CAPSULE BY MOUTH TWICE A DAY, Disp: 60 capsule, Rfl: 2  •  LIVALO 2 MG tablet tablet, TAKE ONE TABLET BY MOUTH ONCE NIGHTLY, Disp: 30 tablet, Rfl: 11  •  pantoprazole (PROTONIX) 40 MG EC tablet, Take 1 tablet by mouth 2 (Two) Times a Day., Disp: 180 tablet, Rfl: 3  •  RESTASIS 0.05 % ophthalmic emulsion, , Disp: , Rfl:   •  senna (SENOKOT) 8.6 MG tablet tablet, Take 1 tablet by mouth Every Night., Disp: , Rfl:   •  Xiidra 5 % ophthalmic solution, , Disp: , Rfl:   •  ondansetron (Zofran) 4 MG tablet, Take 1 tablet by mouth Every 4 (Four) Hours As Needed for Nausea or Vomiting., Disp: 60 tablet, Rfl: 3  •  Plecanatide 3 MG tablet, Take 1 tablet by mouth Every Morning., Disp: 90 tablet, Rfl: 3    ALLERGIES  Atorvastatin, Codeine, Crestor [rosuvastatin calcium], Simvastatin, and Sulfa antibiotics    REVIEW OF SYSTEMS  ROS: 14 point review of systems was performed and all other systems were reviewed and are negative except for documented findings in HPI and today's encounter.   Review of Systems   Constitutional: Negative for activity change, chills, fever and unexpected weight change.   HENT: Negative for  congestion.    Eyes: Negative for visual disturbance.   Respiratory: Negative for shortness of breath.    Cardiovascular: Negative for chest pain and palpitations.   Gastrointestinal: Positive for abdominal pain, constipation, diarrhea and vomiting. Negative for blood in stool.   Endocrine: Negative for cold intolerance and heat intolerance.   Genitourinary: Negative for hematuria.   Musculoskeletal: Negative for gait problem.   Skin: Negative for color change.   Allergic/Immunologic: Negative for immunocompromised state.   Neurological: Negative for weakness and light-headedness.   Hematological: Negative for adenopathy.   Psychiatric/Behavioral: Negative for sleep disturbance. The patient is not nervous/anxious.          History     Last Reviewed by Ivonne Acuna APRN on 1/29/2021 at  9:26 AM    Sections Reviewed    Tobacco, Family, Medical, Surgical      Problem list reviewed by Ivonne Acuna APRN on 1/29/2021 at  9:26 AM  Medicines reviewed by Iovnne Acuna APRN on 1/29/2021 at  9:26 AM  Allergies reviewed by Ivonne Acuna APRN on 1/29/2021 at  9:26 AM      ACTIVE PROBLEMS  Patient Active Problem List    Diagnosis   • Fibromyalgia [M79.7]   • Other hyperlipidemia [E78.49]   • Reactive depression [F32.9]   • Gastroesophageal reflux disease without esophagitis [K21.9]         PAST MEDICAL HISTORY  Past Medical History:   Diagnosis Date   • Fibromyalgia    • Gastroesophageal reflux disease without esophagitis    • Hernia, hiatal    • High cholesterol    • IBS (irritable bowel syndrome)    • Other hyperlipidemia    • Reactive depression          SURGICAL HISTORY  Past Surgical History:   Procedure Laterality Date   • COLONOSCOPY           FAMILY HISTORY  Family History   Problem Relation Age of Onset   • Hypertension Mother    • Hyperlipidemia Mother    • Hypertension Father    • Hyperlipidemia Father    • Stroke Father    • Cancer Sister    • Aneurysm Maternal Uncle    • Heart disease Maternal Grandmother     • Colon cancer Neg Hx    • Colon polyps Neg Hx          SOCIAL HISTORY  Social History     Occupational History   • Not on file   Tobacco Use   • Smoking status: Never Smoker   • Smokeless tobacco: Never Used   • Tobacco comment: caffeine use   Substance and Sexual Activity   • Alcohol use: No   • Drug use: No   • Sexual activity: Defer         LAST RESULTS  See also labs reviewed above.   Office Visit on 12/02/2020   Component Date Value Ref Range Status   • WBC 12/02/2020 4.60  3.40 - 10.80 10*3/mm3 Final   • RBC 12/02/2020 4.74  3.77 - 5.28 10*6/mm3 Final   • Hemoglobin 12/02/2020 13.5  12.0 - 15.9 g/dL Final   • Hematocrit 12/02/2020 40.6  34.0 - 46.6 % Final   • MCV 12/02/2020 85.7  79.0 - 97.0 fL Final   • MCH 12/02/2020 28.5  26.6 - 33.0 pg Final   • MCHC 12/02/2020 33.3  31.5 - 35.7 g/dL Final   • RDW 12/02/2020 13.4  12.3 - 15.4 % Final   • Platelets 12/02/2020 346  140 - 450 10*3/mm3 Final   • Neutrophil Rel % 12/02/2020 53.2  42.7 - 76.0 % Final   • Lymphocyte Rel % 12/02/2020 29.8  19.6 - 45.3 % Final   • Monocyte Rel % 12/02/2020 8.9  5.0 - 12.0 % Final   • Eosinophil Rel % 12/02/2020 7.2* 0.3 - 6.2 % Final   • Basophil Rel % 12/02/2020 0.7  0.0 - 1.5 % Final   • Neutrophils Absolute 12/02/2020 2.45  1.70 - 7.00 10*3/mm3 Final   • Lymphocytes Absolute 12/02/2020 1.37  0.70 - 3.10 10*3/mm3 Final   • Monocytes Absolute 12/02/2020 0.41  0.10 - 0.90 10*3/mm3 Final   • Eosinophils Absolute 12/02/2020 0.33  0.00 - 0.40 10*3/mm3 Final   • Basophils Absolute 12/02/2020 0.03  0.00 - 0.20 10*3/mm3 Final   • Immature Granulocyte Rel % 12/02/2020 0.2  0.0 - 0.5 % Final   • Immature Grans Absolute 12/02/2020 0.01  0.00 - 0.05 10*3/mm3 Final   • nRBC 12/02/2020 0.0  0.0 - 0.2 /100 WBC Final   • Glucose 12/02/2020 97  65 - 99 mg/dL Final   • BUN 12/02/2020 9  6 - 20 mg/dL Final   • Creatinine 12/02/2020 0.73  0.57 - 1.00 mg/dL Final   • eGFR Non African Am 12/02/2020 82  >60 mL/min/1.73 Final   • eGFR African Am  "12/02/2020 99  >60 mL/min/1.73 Final   • BUN/Creatinine Ratio 12/02/2020 12.3  7.0 - 25.0 Final   • Sodium 12/02/2020 139  136 - 145 mmol/L Final   • Potassium 12/02/2020 4.6  3.5 - 5.2 mmol/L Final   • Chloride 12/02/2020 100  98 - 107 mmol/L Final   • Total CO2 12/02/2020 30.7* 22.0 - 29.0 mmol/L Final   • Calcium 12/02/2020 9.5  8.6 - 10.5 mg/dL Final   • Total Protein 12/02/2020 6.8  6.0 - 8.5 g/dL Final   • Albumin 12/02/2020 4.40  3.50 - 5.20 g/dL Final   • Globulin 12/02/2020 2.4  gm/dL Final   • A/G Ratio 12/02/2020 1.8  g/dL Final   • Total Bilirubin 12/02/2020 0.2  0.0 - 1.2 mg/dL Final   • Alkaline Phosphatase 12/02/2020 85  39 - 117 U/L Final   • AST (SGOT) 12/02/2020 10  1 - 32 U/L Final   • ALT (SGPT) 12/02/2020 13  1 - 33 U/L Final   • Creatine Kinase 12/02/2020 48  20 - 180 U/L Final   • Total Cholesterol 12/02/2020 219* 0 - 200 mg/dL Final   • Triglycerides 12/02/2020 83  0 - 150 mg/dL Final   • HDL Cholesterol 12/02/2020 87* 40 - 60 mg/dL Final   • VLDL Cholesterol Emmett 12/02/2020 14  5 - 40 mg/dL Final   • LDL Chol Calc (NIH) 12/02/2020 118* 0 - 100 mg/dL Final   • Chol/HDL Ratio 12/02/2020 2.52   Final   • TSH 12/02/2020 2.450  0.270 - 4.200 uIU/mL Final   • 25 Hydroxy, Vitamin D 12/02/2020 24.6* 30.0 - 100.0 ng/ml Final    Comment: Results may be falsely increased if patient taking Biotin.  Reference Range for Total Vitamin D 25(OH)  Deficiency <20.0 ng/mL  Insufficiency 21-29 ng/mL  Sufficiency  ng/mL  Toxicity >100 ng/ml     • Vitamin B-12 12/02/2020 616  211 - 946 pg/mL Final    Results may be falsely increased if patient taking Biotin.     No results found.    PHYSICAL EXAM  Debilities/Disabilities Identified: None  Emotional Behavior: Appropriate  60 y.o. female  Wt Readings from Last 3 Encounters:   01/29/21 70.9 kg (156 lb 6.4 oz)   12/30/20 72.2 kg (159 lb 3.2 oz)   12/02/20 70.8 kg (156 lb)     Ht Readings from Last 1 Encounters:   01/29/21 162.6 cm (64\")     Body mass index is " "26.85 kg/m².  Vitals:    01/29/21 0852   Weight: 70.9 kg (156 lb 6.4 oz)   Height: 162.6 cm (64\")     Vital signs reviewed.          Physical Exam  Vitals signs and nursing note reviewed.   Constitutional:       Appearance: She is well-developed.   HENT:      Head: Normocephalic and atraumatic.   Eyes:      Conjunctiva/sclera: Conjunctivae normal.      Pupils: Pupils are equal, round, and reactive to light.   Neck:      Musculoskeletal: Normal range of motion and neck supple.   Cardiovascular:      Rate and Rhythm: Normal rate and regular rhythm.   Pulmonary:      Effort: Pulmonary effort is normal.      Breath sounds: Normal breath sounds.   Abdominal:      General: Bowel sounds are normal. There is no distension.      Palpations: Abdomen is soft.      Tenderness: There is abdominal tenderness in the epigastric area and left upper quadrant.      Comments: Normal bs throughout, bloating in the stomach   Musculoskeletal: Normal range of motion.   Lymphadenopathy:      Cervical: No cervical adenopathy.   Skin:     General: Skin is warm and dry.   Neurological:      Mental Status: She is alert and oriented to person, place, and time.   Psychiatric:         Behavior: Behavior normal.           CLINICAL DATA REVIEWED   reviewed previous lab results and integrated with today's visit, reviewed notes from other physicians and/or last GI encounter, reviewed previous endoscopy results and available photos, reviewed surgical pathology results from previous biopsies      ASSESSMENT  Diagnoses and all orders for this visit:    Chronic idiopathic constipation  -     Plecanatide 3 MG tablet; Take 1 tablet by mouth Every Morning.    Bilious vomiting with nausea  -     ondansetron (Zofran) 4 MG tablet; Take 1 tablet by mouth Every 4 (Four) Hours As Needed for Nausea or Vomiting.    Epigastric pain  -     ondansetron (Zofran) 4 MG tablet; Take 1 tablet by mouth Every 4 (Four) Hours As Needed for Nausea or Vomiting.    Noninfectious " gastroenteritis, unspecified type  -     ondansetron (Zofran) 4 MG tablet; Take 1 tablet by mouth Every 4 (Four) Hours As Needed for Nausea or Vomiting.          PLAN  Return in about 2 months (around 3/29/2021).     Increase probiotic to 3 a day.  protonix 40mg twice a day, (coupon card $25) trulance/plecanatide 3mg every morning for constipation, zofran/ondansetron for nausea as needed.      Don't eat late, don't eat fried and don't eat too much at one time. Avoid tomato based products like pizza, lasagna, spagetti.  If you want to have these take an over the counter Pepcid immediately before you eat them.  Do not eat within 3-4 hrs of bedtime. Limit caffeine and carbonated beverages, if you must have them do not have later in the day.  If reflux is severe elevate the head of the bed. You may also use TUMS, maalox, or mylanta for breakthrough heartburn.    Take a daily probiotic (something with a billion and more different strains is better like probiotic 10 or probiotic gummies) for your gut as well.  AVOID taking NSAIDS (like ibuprofen, Aleve, Motrin, naproxen, meloxicam, etc) as much as possible and use acetaminophen (Tylenol) instead.    Drink lots of water, eat a high fiber diet with 25-30gm a day(check the fiber content in the foods you eat.  Protein bars with 15gm of fiber in each bar are a great supplement daily), and get regular exercise. Use over the counter simethicone xtra strength gas x (125-180mg) 2 twice a day as needed for gas.     High Fiber Food suggestions:    Beans, nuts, vegetables, whole grains, flax seed and darren seeds (which can be stirred into other food) are high in fiber.    Fischer Protein bars from [employer] = 10gm of fiber in each bar (also gluten free)  Quest Protein bars from grocery stores= 15gm of fiber each (also gluten free)  Fiber One bars from grocery stores = 5-6gm of fiber each  Kelloggs Bran Buds cereal 1/2 cup = 17gm of fiber  K[employer]ger brand low sugar Craisins = 13gm of fiber  per serving        I have discussed the above plan with the patient.  They verbalize understanding and are in agreement with the plan.  They have been advised to contact the office for any questions, concerns, or changes related to their health.    25 min spent with patient today >50% spent counseling about natural history and expected course of assessed complaint and reviewed treatment options that have been tried and not tried and those currently available. Questions answered.

## 2021-01-29 NOTE — PATIENT INSTRUCTIONS
Increase probiotic to 3 a day.  protonix 40mg twice a day, (coupon card $25) trulance/plecanatide 3mg every morning for constipation, zofran/ondansetron for nausea as needed.      Don't eat late, don't eat fried and don't eat too much at one time. Avoid tomato based products like pizza, lasagna, spagetti.  If you want to have these take an over the counter Pepcid immediately before you eat them.  Do not eat within 3-4 hrs of bedtime. Limit caffeine and carbonated beverages, if you must have them do not have later in the day.  If reflux is severe elevate the head of the bed. You may also use TUMS, maalox, or mylanta for breakthrough heartburn.    Take a daily probiotic (something with a billion and more different strains is better like probiotic 10 or probiotic gummies) for your gut as well.  AVOID taking NSAIDS (like ibuprofen, Aleve, Motrin, naproxen, meloxicam, etc) as much as possible and use acetaminophen (Tylenol) instead.    Drink lots of water, eat a high fiber diet with 25-30gm a day(check the fiber content in the foods you eat.  Protein bars with 15gm of fiber in each bar are a great supplement daily), and get regular exercise. Use over the counter simethicone xtra strength gas x (125-180mg) 2 twice a day as needed for gas.     High Fiber Food suggestions:    Beans, nuts, vegetables, whole grains, flax seed and darren seeds (which can be stirred into other food) are high in fiber.    Amado Protein bars from [employer] = 10gm of fiber in each bar (also gluten free)  Quest Protein bars from grocery stores= 15gm of fiber each (also gluten free)  Fiber One bars from grocery stores = 5-6gm of fiber each  Kelloggs Bran Buds cereal 1/2 cup = 17gm of fiber  [employer]ger brand low sugar Craisins = 13gm of fiber per serving

## 2021-02-01 ENCOUNTER — TELEPHONE (OUTPATIENT)
Dept: GASTROENTEROLOGY | Facility: CLINIC | Age: 61
End: 2021-02-01

## 2021-02-02 ENCOUNTER — TELEPHONE (OUTPATIENT)
Dept: SURGERY | Facility: CLINIC | Age: 61
End: 2021-02-02

## 2021-02-02 NOTE — TELEPHONE ENCOUNTER
Medication Trulance requires a PA for her insurance to cover, per the [facility] pharmacy.   Could you please call her about this?

## 2021-03-03 RX ORDER — DULOXETIN HYDROCHLORIDE 60 MG/1
60 CAPSULE, DELAYED RELEASE ORAL DAILY
Qty: 30 CAPSULE | Refills: 5 | Status: SHIPPED | OUTPATIENT
Start: 2021-03-03 | End: 2021-07-12 | Stop reason: SDUPTHER

## 2021-03-03 NOTE — TELEPHONE ENCOUNTER
Caller: Esther Duke    Relationship: Self    Best call back number: [phone]    Medication needed:   Requested Prescriptions     Pending Prescriptions Disp Refills   • DULoxetine (CYMBALTA) 60 MG capsule 30 capsule 5     Sig: Take 1 capsule by mouth Daily.       When do you need the refill by: 03/05/2021    What details did the patient provide when requesting the medication: PATIENT HAS A 2 DAY SUPPLY. [facility] ALSO FAXED OVER REFILL REQUEST    Does the patient have less than a 3 day supply:  [x] Yes  [] No    What is the patient's preferred pharmacy: [facility] [address] [facility] - [phone]  - [phone]

## 2021-03-08 RX ORDER — DULOXETIN HYDROCHLORIDE 30 MG/1
CAPSULE, DELAYED RELEASE ORAL
Qty: 30 CAPSULE | Refills: 4 | Status: SHIPPED | OUTPATIENT
Start: 2021-03-08 | End: 2021-04-21 | Stop reason: ALTCHOICE

## 2021-03-09 ENCOUNTER — OFFICE VISIT (OUTPATIENT)
Dept: FAMILY MEDICINE CLINIC | Facility: CLINIC | Age: 61
End: 2021-03-09

## 2021-03-09 VITALS
BODY MASS INDEX: 26.46 KG/M2 | OXYGEN SATURATION: 99 % | RESPIRATION RATE: 16 BRPM | HEIGHT: 64 IN | TEMPERATURE: 96.9 F | WEIGHT: 155 LBS | HEART RATE: 79 BPM | DIASTOLIC BLOOD PRESSURE: 80 MMHG | SYSTOLIC BLOOD PRESSURE: 118 MMHG

## 2021-03-09 DIAGNOSIS — M25.511 ACUTE PAIN OF RIGHT SHOULDER: ICD-10-CM

## 2021-03-09 DIAGNOSIS — M79.7 FIBROMYALGIA: ICD-10-CM

## 2021-03-09 DIAGNOSIS — K21.9 GASTROESOPHAGEAL REFLUX DISEASE WITHOUT ESOPHAGITIS: ICD-10-CM

## 2021-03-09 DIAGNOSIS — M25.512 ACUTE PAIN OF LEFT SHOULDER: ICD-10-CM

## 2021-03-09 DIAGNOSIS — E78.49 OTHER HYPERLIPIDEMIA: Primary | ICD-10-CM

## 2021-03-09 DIAGNOSIS — H92.02 LEFT EAR PAIN: ICD-10-CM

## 2021-03-09 DIAGNOSIS — F32.9 REACTIVE DEPRESSION: ICD-10-CM

## 2021-03-09 PROCEDURE — 99214 OFFICE O/P EST MOD 30 MIN: CPT | Performed by: INTERNAL MEDICINE

## 2021-03-09 RX ORDER — GABAPENTIN 300 MG/1
300 CAPSULE ORAL 2 TIMES DAILY
Qty: 60 CAPSULE | Refills: 2 | Status: SHIPPED | OUTPATIENT
Start: 2021-03-09 | End: 2021-07-14

## 2021-03-09 RX ORDER — METHYLPREDNISOLONE 4 MG/1
TABLET ORAL
Qty: 1 EACH | Refills: 0 | Status: SHIPPED | OUTPATIENT
Start: 2021-03-09 | End: 2021-03-24

## 2021-03-09 NOTE — PROGRESS NOTES
Subjective   Esther Duke is a 60 y.o. female.     Chief Complaint   Patient presents with   • Hyperlipidemia     follow up   • Earache     lt with sore throat  no fever   Multiple problems    History of Present Illness   Patient here follow-up for hyperlipidemia, fibromyalgia, depression, GERD.  Denies any chest pain shortness of breath.  Continues to take all current medications.  Complains of right shoulder pain, left ear pain and sore throat.  No fever or chills.  Right shoulder pain for several weeks.  Reports her fibromyalgia is flaring up.  Asking for a cortisone shot.  The following portions of the patient's history were reviewed and updated as appropriate: allergies, current medications, past family history, past medical history, past social history, past surgical history and problem list.    Review of Systems   Constitutional: Negative for activity change, appetite change, fatigue and fever.   HENT: Positive for sore throat.    Eyes: Negative for blurred vision and double vision.   Respiratory: Negative.  Negative for shortness of breath.    Cardiovascular: Negative for chest pain, palpitations and leg swelling.   Gastrointestinal: Negative.    Genitourinary: Negative for dyspareunia, flank pain and hematuria.   Musculoskeletal: Positive for arthralgias.   Neurological: Negative.  Negative for dizziness, syncope and light-headedness.   Psychiatric/Behavioral: Negative for agitation, behavioral problems, self-injury and suicidal ideas.       Allergies   Allergen Reactions   • Atorvastatin Myalgia   • Codeine GI Intolerance   • Crestor [Rosuvastatin Calcium] Myalgia   • Simvastatin Myalgia   • Sulfa Antibiotics Hives       Current Outpatient Medications on File Prior to Visit   Medication Sig Dispense Refill   • DULoxetine (CYMBALTA) 30 MG capsule TAKE ONE CAPSULE BY MOUTH DAILY 30 capsule 4   • DULoxetine (CYMBALTA) 60 MG capsule Take 1 capsule by mouth Daily. 30 capsule 5   • LIVALO 2 MG tablet tablet  TAKE ONE TABLET BY MOUTH ONCE NIGHTLY 30 tablet 11   • ondansetron (Zofran) 4 MG tablet Take 1 tablet by mouth Every 4 (Four) Hours As Needed for Nausea or Vomiting. 60 tablet 3   • pantoprazole (PROTONIX) 40 MG EC tablet Take 1 tablet by mouth 2 (Two) Times a Day. 180 tablet 3   • senna (SENOKOT) 8.6 MG tablet tablet Take 1 tablet by mouth Every Night.     • Xiidra 5 % ophthalmic solution      • Plecanatide 3 MG tablet Take 1 tablet by mouth Every Morning. 90 tablet 3   • RESTASIS 0.05 % ophthalmic emulsion        No current facility-administered medications on file prior to visit.       Family History   Problem Relation Age of Onset   • Hypertension Mother    • Hyperlipidemia Mother    • Hypertension Father    • Hyperlipidemia Father    • Stroke Father    • Cancer Sister    • Aneurysm Maternal Uncle    • Heart disease Maternal Grandmother    • Colon cancer Neg Hx    • Colon polyps Neg Hx        Past Medical History:   Diagnosis Date   • Fibromyalgia    • Gastroesophageal reflux disease without esophagitis    • Hernia, hiatal    • High cholesterol    • IBS (irritable bowel syndrome)    • Other hyperlipidemia    • Reactive depression        Past Surgical History:   Procedure Laterality Date   • COLONOSCOPY         Social History     Socioeconomic History   • Marital status: Unknown     Spouse name: Not on file   • Number of children: Not on file   • Years of education: Not on file   • Highest education level: Not on file   Tobacco Use   • Smoking status: Never Smoker   • Smokeless tobacco: Never Used   • Tobacco comment: caffeine use   Substance and Sexual Activity   • Alcohol use: No   • Drug use: No   • Sexual activity: Defer       Patient Active Problem List   Diagnosis   • Fibromyalgia   • Other hyperlipidemia   • Reactive depression   • Gastroesophageal reflux disease without esophagitis       /80 (BP Location: Right arm, Patient Position: Sitting, Cuff Size: Adult)   Pulse 79   Temp 96.9 °F (36.1 °C)   " Resp 16   Ht 162.6 cm (64\")   Wt 70.3 kg (155 lb)   SpO2 99%   BMI 26.61 kg/m²   Body mass index is 26.61 kg/m².    Objective   Physical Exam  Vitals and nursing note reviewed.   Constitutional:       Appearance: She is well-developed.   HENT:      Right Ear: Tympanic membrane, ear canal and external ear normal. There is no impacted cerumen.      Left Ear: Tympanic membrane, ear canal and external ear normal. There is no impacted cerumen.      Mouth/Throat:      Mouth: Mucous membranes are moist.      Pharynx: Oropharynx is clear. No oropharyngeal exudate or posterior oropharyngeal erythema.   Eyes:      Pupils: Pupils are equal, round, and reactive to light.   Neck:      Thyroid: No thyromegaly.      Vascular: No JVD.      Trachea: No tracheal deviation.   Cardiovascular:      Rate and Rhythm: Normal rate and regular rhythm.      Heart sounds: No murmur.   Pulmonary:      Effort: Pulmonary effort is normal. No respiratory distress.      Breath sounds: Normal breath sounds. No wheezing.   Abdominal:      General: Bowel sounds are normal. There is no distension.      Palpations: Abdomen is soft. There is no mass.      Tenderness: There is no abdominal tenderness. There is no right CVA tenderness, left CVA tenderness, guarding or rebound.      Hernia: No hernia is present.   Musculoskeletal:         General: Tenderness present. No swelling, deformity or signs of injury.      Cervical back: Normal range of motion and neck supple.      Comments: Right shoulder decreased range of motion and tender   Lymphadenopathy:      Cervical: No cervical adenopathy.   Neurological:      General: No focal deficit present.      Mental Status: She is alert and oriented to person, place, and time. Mental status is at baseline.      Cranial Nerves: No cranial nerve deficit.      Sensory: No sensory deficit.      Motor: No weakness.      Coordination: Coordination normal.      Gait: Gait normal.      Deep Tendon Reflexes: Reflexes " normal.   Psychiatric:         Mood and Affect: Mood normal.         Behavior: Behavior normal.           Assessment/Plan   Diagnoses and all orders for this visit:    1. Other hyperlipidemia (Primary)  -     CBC & Differential  -     CK  -     Comprehensive Metabolic Panel  -     Lipid Panel With / Chol / HDL Ratio  -     TSH    2. Gastroesophageal reflux disease without esophagitis  -     CBC & Differential  -     CK  -     Comprehensive Metabolic Panel  -     Lipid Panel With / Chol / HDL Ratio  -     TSH    3. Reactive depression  -     CBC & Differential  -     CK  -     Comprehensive Metabolic Panel  -     Lipid Panel With / Chol / HDL Ratio  -     TSH    4. Fibromyalgia  -     gabapentin (NEURONTIN) 300 MG capsule; Take 1 capsule by mouth 2 (Two) Times a Day.  Dispense: 60 capsule; Refill: 2  -     CBC & Differential  -     CK  -     Comprehensive Metabolic Panel  -     Lipid Panel With / Chol / HDL Ratio  -     TSH    5. Left ear pain    6. Acute pain of left shoulder  -     Ambulatory Referral to Orthopedic Surgery    7. Acute pain of right shoulder    Other orders  -     methylPREDNISolone (MEDROL) 4 MG dose pack; Take as directed on package instructions.  Dispense: 1 each; Refill: 0    Referral to orthopedics for right shoulder pain.  Medrol Dosepak 4 fibromyalgia flareup.  Office does not have injectable cortisones.  Continue all current medications that includes Cymbalta 90 mg, Neurontin, Protonix.  Patient has already seen gastroenterologist.  Her throat and ear exam was normal.  I told her to observe at this time.  No medications needed.  Her fibromyalgia is flaring up at this time secondary to season.  Rest of her problems are chronic stable see her back in 3 months, if her ear pain sore throat does not get better she will come earlier

## 2021-03-10 LAB
ALBUMIN SERPL-MCNC: 4.3 G/DL (ref 3.5–5.2)
ALBUMIN/GLOB SERPL: 2.2 G/DL
ALP SERPL-CCNC: 79 U/L (ref 39–117)
ALT SERPL-CCNC: 11 U/L (ref 1–33)
AST SERPL-CCNC: 15 U/L (ref 1–32)
BASOPHILS # BLD AUTO: 0.05 10*3/MM3 (ref 0–0.2)
BASOPHILS NFR BLD AUTO: 1 % (ref 0–1.5)
BILIRUB SERPL-MCNC: 0.2 MG/DL (ref 0–1.2)
BUN SERPL-MCNC: 10 MG/DL (ref 6–20)
BUN/CREAT SERPL: 15.4 (ref 7–25)
CALCIUM SERPL-MCNC: 9.6 MG/DL (ref 8.6–10.5)
CHLORIDE SERPL-SCNC: 103 MMOL/L (ref 98–107)
CHOLEST SERPL-MCNC: 238 MG/DL (ref 0–200)
CHOLEST/HDLC SERPL: 2.9 {RATIO}
CK SERPL-CCNC: 56 U/L (ref 20–180)
CO2 SERPL-SCNC: 29.1 MMOL/L (ref 22–29)
CREAT SERPL-MCNC: 0.65 MG/DL (ref 0.57–1)
EOSINOPHIL # BLD AUTO: 0.33 10*3/MM3 (ref 0–0.4)
EOSINOPHIL NFR BLD AUTO: 6.9 % (ref 0.3–6.2)
ERYTHROCYTE [DISTWIDTH] IN BLOOD BY AUTOMATED COUNT: 13.6 % (ref 12.3–15.4)
GLOBULIN SER CALC-MCNC: 2 GM/DL
GLUCOSE SERPL-MCNC: 97 MG/DL (ref 65–99)
HCT VFR BLD AUTO: 41.3 % (ref 34–46.6)
HDLC SERPL-MCNC: 82 MG/DL (ref 40–60)
HGB BLD-MCNC: 13.6 G/DL (ref 12–15.9)
IMM GRANULOCYTES # BLD AUTO: 0.01 10*3/MM3 (ref 0–0.05)
IMM GRANULOCYTES NFR BLD AUTO: 0.2 % (ref 0–0.5)
LDLC SERPL CALC-MCNC: 143 MG/DL (ref 0–100)
LYMPHOCYTES # BLD AUTO: 1.47 10*3/MM3 (ref 0.7–3.1)
LYMPHOCYTES NFR BLD AUTO: 30.7 % (ref 19.6–45.3)
MCH RBC QN AUTO: 28.6 PG (ref 26.6–33)
MCHC RBC AUTO-ENTMCNC: 32.9 G/DL (ref 31.5–35.7)
MCV RBC AUTO: 86.9 FL (ref 79–97)
MONOCYTES # BLD AUTO: 0.45 10*3/MM3 (ref 0.1–0.9)
MONOCYTES NFR BLD AUTO: 9.4 % (ref 5–12)
NEUTROPHILS # BLD AUTO: 2.48 10*3/MM3 (ref 1.7–7)
NEUTROPHILS NFR BLD AUTO: 51.8 % (ref 42.7–76)
NRBC BLD AUTO-RTO: 0 /100 WBC (ref 0–0.2)
PLATELET # BLD AUTO: 363 10*3/MM3 (ref 140–450)
POTASSIUM SERPL-SCNC: 5 MMOL/L (ref 3.5–5.2)
PROT SERPL-MCNC: 6.3 G/DL (ref 6–8.5)
RBC # BLD AUTO: 4.75 10*6/MM3 (ref 3.77–5.28)
SODIUM SERPL-SCNC: 141 MMOL/L (ref 136–145)
TRIGL SERPL-MCNC: 76 MG/DL (ref 0–150)
TSH SERPL DL<=0.005 MIU/L-ACNC: 2.99 UIU/ML (ref 0.27–4.2)
VLDLC SERPL CALC-MCNC: 13 MG/DL (ref 5–40)
WBC # BLD AUTO: 4.79 10*3/MM3 (ref 3.4–10.8)

## 2021-03-24 ENCOUNTER — OFFICE VISIT (OUTPATIENT)
Dept: ORTHOPEDIC SURGERY | Facility: CLINIC | Age: 61
End: 2021-03-24

## 2021-03-24 VITALS
WEIGHT: 154 LBS | RESPIRATION RATE: 18 BRPM | DIASTOLIC BLOOD PRESSURE: 83 MMHG | HEIGHT: 64 IN | HEART RATE: 85 BPM | SYSTOLIC BLOOD PRESSURE: 127 MMHG | BODY MASS INDEX: 26.29 KG/M2

## 2021-03-24 DIAGNOSIS — G89.29 CHRONIC FOOT PAIN, RIGHT: ICD-10-CM

## 2021-03-24 DIAGNOSIS — M75.51 SUBACROMIAL BURSITIS OF RIGHT SHOULDER JOINT: Primary | ICD-10-CM

## 2021-03-24 DIAGNOSIS — M79.671 CHRONIC FOOT PAIN, RIGHT: ICD-10-CM

## 2021-03-24 PROCEDURE — 73030 X-RAY EXAM OF SHOULDER: CPT | Performed by: NURSE PRACTITIONER

## 2021-03-24 PROCEDURE — 20610 DRAIN/INJ JOINT/BURSA W/O US: CPT | Performed by: NURSE PRACTITIONER

## 2021-03-24 PROCEDURE — 99213 OFFICE O/P EST LOW 20 MIN: CPT | Performed by: NURSE PRACTITIONER

## 2021-03-24 RX ORDER — TRIAMCINOLONE ACETONIDE 40 MG/ML
80 INJECTION, SUSPENSION INTRA-ARTICULAR; INTRAMUSCULAR
Status: COMPLETED | OUTPATIENT
Start: 2021-03-24 | End: 2021-03-24

## 2021-03-24 RX ORDER — LIDOCAINE HYDROCHLORIDE 10 MG/ML
4 INJECTION, SOLUTION EPIDURAL; INFILTRATION; INTRACAUDAL; PERINEURAL
Status: COMPLETED | OUTPATIENT
Start: 2021-03-24 | End: 2021-03-24

## 2021-03-24 RX ADMIN — LIDOCAINE HYDROCHLORIDE 4 ML: 10 INJECTION, SOLUTION EPIDURAL; INFILTRATION; INTRACAUDAL; PERINEURAL at 09:44

## 2021-03-24 RX ADMIN — TRIAMCINOLONE ACETONIDE 80 MG: 40 INJECTION, SUSPENSION INTRA-ARTICULAR; INTRAMUSCULAR at 09:44

## 2021-03-24 NOTE — PROGRESS NOTES
Subjective:     Patient ID: Esther Duke is a 60 y.o. female.    Chief Complaint:  Left shoulder pain, new patient to examiner   Right foot pain, great toe  History of Present Illness   Esther Duke 60-year-old female presents to clinic for evaluation of her right upper extremity.  Shoulder pain off and on for the last 6 weeks denies known injury.  Maximal tenderness present the lateral, posterior aspect.  Increased pain noted mostly with reaching out to the side which is when donning jacket or grooming activities.  She is taken Tylenol has tried rest completed a steroid pack recently all without any significant symptom relief.  Denies presence of numbness or tingling right upper extremity.  She is experiencing pain radiating to the top of the shoulder increased pain noted at night when attempting to sleep on the right upper extremity.  Again denies any prior injury denies prior x-ray, MRI, CT right shoulder.  Denies any previous corticosteroid injections, physical therapy other treatments.   Rates discomfort at worst an 8-9 out of a 10 described as shooting in nature also experiencing pain at the right foot, great toe.  Denies known injury has been experiencing pain at the right foot for the last 3 years does notice bony abnormality at the lateral aspect right foot.  Increased pain noted with weightbearing activities. Denies other concerns present this time.      Social History     Occupational History   • Not on file   Tobacco Use   • Smoking status: Never Smoker   • Smokeless tobacco: Never Used   • Tobacco comment: caffeine use   Vaping Use   • Vaping Use: Never used   Substance and Sexual Activity   • Alcohol use: No   • Drug use: No   • Sexual activity: Defer      Past Medical History:   Diagnosis Date   • Fibromyalgia    • Gastroesophageal reflux disease without esophagitis    • Hernia, hiatal    • High cholesterol    • IBS (irritable bowel syndrome)    • Other hyperlipidemia    • Reactive depression   "    Past Surgical History:   Procedure Laterality Date   • COLONOSCOPY         Family History   Problem Relation Age of Onset   • Hypertension Mother    • Hyperlipidemia Mother    • Hypertension Father    • Hyperlipidemia Father    • Stroke Father    • Cancer Sister    • Aneurysm Maternal Uncle    • Heart disease Maternal Grandmother    • Colon cancer Neg Hx    • Colon polyps Neg Hx          Objective:  Physical Exam    Vital signs reviewed.   General: No acute distress.  Eyes: conjunctiva clear; pupils equally round and reactive  ENT: external ears and nose atraumatic; oropharynx clear  CV: no peripheral edema  Resp: normal respiratory effort  Skin: no rashes or wounds; normal turgor  Psych: mood and affect appropriate; recent and remote memory intact    Vitals:    03/24/21 0858   BP: 127/83   Pulse: 85   Resp: 18   Weight: 69.9 kg (154 lb)   Height: 162.6 cm (64\")         03/24/21  0858   Weight: 69.9 kg (154 lb)     Body mass index is 26.43 kg/m².      Right Shoulder Exam     Tenderness   The patient is experiencing tenderness in the acromion.    Range of Motion   External rotation: 60   Forward flexion: 180   Internal rotation 0 degrees: T12     Muscle Strength   Internal rotation: 4/5   External rotation: 4/5   Supraspinatus: 4/5   Subscapularis: 4/5   Biceps: 4/5     Tests   Dave test: positive  Cross arm: negative  Impingement: positive  Drop arm: negative    Other   Erythema: absent  Sensation: normal  Pulse: present    Comments:  Negative empty can  negative Whiteside's  negative Speed's  negative bear hug exam             Imaging:  Right Shoulder X-Ray  Indication: Pain  AP Internal and External Rotation views    Findings:  No fracture  No bony lesion  Normal soft tissues  Normal joint spaces  AC joint arthropathy   No prior studies were available for comparison.    Assessment:        1. Acute pain of right shoulder    2. Chronic foot pain, right           Plan:  1. Discussed plan of care with patient.  " We will proceed with referral to Ortho foot and ankle for evaluation of great toe pain.  2.  We will proceed with corticosteroid injection subacromial bursa of the right shoulder.  Plan to see her back in clinic in 4 weeks to reevaluate.  Discussed home strengthening exercises which were provided to her at today's visit.  Encouraged to call between now and follow-up with any questions discussed application of ice at injection site.  All questions answered.  Large Joint Arthrocentesis: R subacromial bursa  Date/Time: 3/24/2021 9:44 AM  Consent given by: patient  Site marked: site marked  Timeout: Immediately prior to procedure a time out was called to verify the correct patient, procedure, equipment, support staff and site/side marked as required   Supporting Documentation  Indications: pain   Procedure Details  Location: shoulder - R subacromial bursa  Preparation: Patient was prepped and draped in the usual sterile fashion  Needle size: 22 G  Approach: posterior  Medications administered: 4 mL lidocaine PF 1% 1 %; 80 mg triamcinolone acetonide 40 MG/ML  Patient tolerance: patient tolerated the procedure well with no immediate complications        Orders:  Orders Placed This Encounter   Procedures   • Large Joint Arthrocentesis: R subacromial bursa   • XR Shoulder 2+ View Right   • Ambulatory Referral to Orthopedic Surgery       Medications:  No orders of the defined types were placed in this encounter.      Followup:  No follow-ups on file.    Diagnoses and all orders for this visit:    1. Acute pain of right shoulder (Primary)  -     XR Shoulder 2+ View Right    2. Chronic foot pain, right  -     Ambulatory Referral to Orthopedic Surgery    Other orders  -     Large Joint Arthrocentesis: R subacromial bursa          I ordered and reviewed the JULIETA today.     Dictated utilizing Dragon dictation

## 2021-04-21 ENCOUNTER — OFFICE VISIT (OUTPATIENT)
Dept: GASTROENTEROLOGY | Facility: CLINIC | Age: 61
End: 2021-04-21

## 2021-04-21 ENCOUNTER — OFFICE VISIT (OUTPATIENT)
Dept: CARDIOLOGY | Facility: CLINIC | Age: 61
End: 2021-04-21

## 2021-04-21 VITALS
OXYGEN SATURATION: 99 % | HEART RATE: 74 BPM | SYSTOLIC BLOOD PRESSURE: 124 MMHG | DIASTOLIC BLOOD PRESSURE: 80 MMHG | RESPIRATION RATE: 16 BRPM | HEIGHT: 64 IN | WEIGHT: 153 LBS | BODY MASS INDEX: 26.12 KG/M2

## 2021-04-21 VITALS — HEIGHT: 64 IN | TEMPERATURE: 97.2 F | WEIGHT: 157.4 LBS | BODY MASS INDEX: 26.87 KG/M2

## 2021-04-21 DIAGNOSIS — K59.04 CHRONIC IDIOPATHIC CONSTIPATION: Primary | ICD-10-CM

## 2021-04-21 DIAGNOSIS — K21.00 GASTROESOPHAGEAL REFLUX DISEASE WITH ESOPHAGITIS WITHOUT HEMORRHAGE: ICD-10-CM

## 2021-04-21 DIAGNOSIS — M79.7 FIBROMYALGIA: ICD-10-CM

## 2021-04-21 DIAGNOSIS — K58.1 IRRITABLE BOWEL SYNDROME WITH CONSTIPATION: ICD-10-CM

## 2021-04-21 DIAGNOSIS — R10.13 DYSPEPSIA: ICD-10-CM

## 2021-04-21 DIAGNOSIS — R14.0 ABDOMINAL BLOATING: ICD-10-CM

## 2021-04-21 DIAGNOSIS — E78.49 OTHER HYPERLIPIDEMIA: Primary | ICD-10-CM

## 2021-04-21 PROCEDURE — 99214 OFFICE O/P EST MOD 30 MIN: CPT | Performed by: NURSE PRACTITIONER

## 2021-04-21 PROCEDURE — 93000 ELECTROCARDIOGRAM COMPLETE: CPT | Performed by: NURSE PRACTITIONER

## 2021-04-21 RX ORDER — FAMOTIDINE 40 MG/1
40 TABLET, FILM COATED ORAL 2 TIMES DAILY
Qty: 180 TABLET | Refills: 3 | Status: SHIPPED | OUTPATIENT
Start: 2021-04-21 | End: 2022-02-14

## 2021-04-21 NOTE — PROGRESS NOTES
"  Date of Office Visit: 2021  Encounter Provider: MARIANELA Jones  Place of Service: [facility] CARDIOLOGY  Patient Name: Esther Duke  :1960  Primary Cardiologist: Dr. Chavez    CC:  Annual cardiac evaluation    Dear Dr. Armstrong    HPI: Esther Duke is a pleasant 60 y.o. female who presents 2021 for cardiac follow up.  She is a new patient to me and I have reviewed her past medical records.  She is a patient of Dr. Chavez.     She has no history of blood clots, diabetes, hypertension, asthma, emphysema, seizures, stroke, cancer or blood clots.  She does not exercise regularly due to fibromyalgia.  She also has irritable bowel syndrome and GERD.  She has had severe muscle aching associate with rosuvastatin, atorvastatin and simvastatin.  She is currently on Livalo and has been able to tolerate it.       CT of the abdomen and pelvis done 2019 has limited cardiac imaging showing no evidence of coronary artery calcification on these limited images.  There is noted to be abdominal aortic calcification but no evidence of aneurysm.     She is , has 2 children works as a .  She uses no cigarettes, alcohol or drugs.  She has 2 cups of coffee per day.  Her father had a history of strokes, HTN and atrial fibrillation. He passed away last Fall after suffering \"several strokes\".   Her mother has HTN.     She presents today for annual cardiac evaluation.  She has no complaints other than fatigue that she attributes to her fibromyalgia.  She had recent lab work performed at your office on 3/9/2021.  She had unremarkable CBC.  She had a normal creatinine kinase of 56.  Her CMP showed normal creatinine at 0.65.  Otherwise within normal limits.  Her lipid panel revealed a total cholesterol of 238, triglycerides 76, HDL 82, .  Her TSH was normal at 2.990.  With her parents history, she is concerned about heart disease.  She did not follow " through and get the vascular screening done last year.  We have given her the information for her to call and get that scheduled this year.  Past Medical History:   Diagnosis Date   • Fibromyalgia    • Gastroesophageal reflux disease without esophagitis    • Hernia, hiatal    • High cholesterol    • IBS (irritable bowel syndrome)    • Other hyperlipidemia    • Reactive depression        Past Surgical History:   Procedure Laterality Date   • COLONOSCOPY         Social History     Socioeconomic History   • Marital status: Unknown     Spouse name: Not on file   • Number of children: Not on file   • Years of education: Not on file   • Highest education level: Not on file   Tobacco Use   • Smoking status: Never Smoker   • Smokeless tobacco: Never Used   • Tobacco comment: caffeine use   Vaping Use   • Vaping Use: Never used   Substance and Sexual Activity   • Alcohol use: No   • Drug use: No   • Sexual activity: Defer       Family History   Problem Relation Age of Onset   • Hypertension Mother    • Hyperlipidemia Mother    • Hypertension Father    • Hyperlipidemia Father    • Stroke Father    • Cancer Sister    • Aneurysm Maternal Uncle    • Heart disease Maternal Grandmother    • Colon cancer Neg Hx    • Colon polyps Neg Hx        The following portion of the patient's history were reviewed and updated as appropriate: past medical history, past surgical history, past social history, past family history, allergies, current medications, and problem list.    Review of Systems   Constitutional: Positive for malaise/fatigue. Negative for diaphoresis and fever.   HENT: Negative for congestion, hearing loss, hoarse voice, nosebleeds and sore throat.    Eyes: Negative for photophobia, vision loss in left eye, vision loss in right eye and visual disturbance.   Cardiovascular: Negative for chest pain, dyspnea on exertion, irregular heartbeat, leg swelling, near-syncope, orthopnea, palpitations, paroxysmal nocturnal dyspnea and  syncope.   Respiratory: Negative for cough, hemoptysis, shortness of breath, sleep disturbances due to breathing, snoring, sputum production and wheezing.    Endocrine: Negative for cold intolerance, heat intolerance, polydipsia, polyphagia and polyuria.   Hematologic/Lymphatic: Negative for bleeding problem. Does not bruise/bleed easily.   Skin: Negative for color change, dry skin, poor wound healing, rash and suspicious lesions.   Musculoskeletal: Negative for arthritis, back pain, falls, gout, joint pain, joint swelling, muscle cramps, muscle weakness and myalgias.   Gastrointestinal: Negative for bloating, abdominal pain, constipation, diarrhea, dysphagia, melena, nausea and vomiting.   Neurological: Negative for excessive daytime sleepiness, dizziness, headaches, light-headedness, loss of balance, numbness, paresthesias, seizures, vertigo and weakness.   Psychiatric/Behavioral: Negative for depression, memory loss and substance abuse. The patient is not nervous/anxious.        Allergies   Allergen Reactions   • Atorvastatin Myalgia   • Codeine GI Intolerance   • Crestor [Rosuvastatin Calcium] Myalgia   • Simvastatin Myalgia   • Sulfa Antibiotics Hives         Current Outpatient Medications:   •  DULoxetine (CYMBALTA) 60 MG capsule, Take 1 capsule by mouth Daily., Disp: 30 capsule, Rfl: 5  •  famotidine (PEPCID) 40 MG tablet, Take 1 tablet by mouth 2 (Two) Times a Day. With lunch and at bedtime, Disp: 180 tablet, Rfl: 3  •  gabapentin (NEURONTIN) 300 MG capsule, Take 1 capsule by mouth 2 (Two) Times a Day., Disp: 60 capsule, Rfl: 2  •  LIVALO 2 MG tablet tablet, TAKE ONE TABLET BY MOUTH ONCE NIGHTLY, Disp: 30 tablet, Rfl: 11  •  pantoprazole (PROTONIX) 40 MG EC tablet, Take 1 tablet by mouth 2 (Two) Times a Day., Disp: 180 tablet, Rfl: 3  •  senna (SENOKOT) 8.6 MG tablet tablet, Take 1 tablet by mouth Every Night., Disp: , Rfl:   •  Xiidra 5 % ophthalmic solution, , Disp: , Rfl:         Objective:     Vitals:     "04/21/21 1030   BP: 124/80   Pulse: 74   Resp: 16   SpO2: 99%   Weight: 69.4 kg (153 lb)   Height: 162.6 cm (64\")     Body mass index is 26.26 kg/m².      Vitals reviewed.   Constitutional:       General: Not in acute distress.     Appearance: Normal and healthy appearance. Well-developed and well-groomed.   Eyes:      General:         Right eye: No discharge.         Left eye: No discharge.      Conjunctiva/sclera: Conjunctivae normal.   HENT:      Head: Normocephalic and atraumatic.      Right Ear: External ear normal.      Left Ear: External ear normal.      Nose: Nose normal.   Neck:      Thyroid: No thyromegaly.      Vascular: No JVD.      Trachea: No tracheal deviation.      Lymphadenopathy: No cervical adenopathy.   Pulmonary:      Effort: Pulmonary effort is normal. No respiratory distress.      Breath sounds: Normal breath sounds. No wheezing. No rales.   Chest:      Chest wall: Not tender to palpatation.   Cardiovascular:      Normal rate. Regular rhythm.      No gallop.   Pulses:     Intact distal pulses.   Edema:     Peripheral edema absent.   Abdominal:      General: Bowel sounds are normal. There is no distension.      Palpations: Abdomen is soft.      Tenderness: There is no abdominal tenderness.   Musculoskeletal: Normal range of motion.         General: No tenderness or deformity.      Cervical back: Normal range of motion and neck supple. Skin:     General: Skin is warm and dry.      Findings: No erythema or rash.   Neurological:      Mental Status: Alert and oriented to person, place, and time.      Coordination: Coordination normal.   Psychiatric:         Attention and Perception: Attention normal.         Mood and Affect: Mood normal.         Speech: Speech normal.         Behavior: Behavior normal. Behavior is cooperative.         Thought Content: Thought content normal.         Cognition and Memory: Cognition normal.         Judgment: Judgment normal.               ECG 12 Lead    Date/Time: " 4/21/2021 10:33 AM  Performed by: Ambar Bray APRN  Authorized by: Ambar Bray APRN   Comparison: compared with previous ECG from 4/15/2020  Similar to previous ECG  Rhythm: sinus rhythm  Rate: normal  Conduction: conduction normal  ST Segments: ST segments normal  T Waves: T waves normal  QRS axis: normal    Clinical impression: normal ECG              Assessment:       Diagnosis Plan   1. Other hyperlipidemia            Plan:       1.  Hyperlipidemia - continue lipid lowering therapy, currently on Livalo, labs as above.  Per PCP  2. Family history of hypertension and atrial fibrillation.  3. Fibromyalgia.  4.  sedentary lifestyle.    Encouraged to complete the vascular screening    RTO in 1 year with RM    As always, it has been a pleasure to participate in your patient's care. Thank you.       Sincerely,       MARIANELA Jones      Current Outpatient Medications:   •  DULoxetine (CYMBALTA) 60 MG capsule, Take 1 capsule by mouth Daily., Disp: 30 capsule, Rfl: 5  •  famotidine (PEPCID) 40 MG tablet, Take 1 tablet by mouth 2 (Two) Times a Day. With lunch and at bedtime, Disp: 180 tablet, Rfl: 3  •  gabapentin (NEURONTIN) 300 MG capsule, Take 1 capsule by mouth 2 (Two) Times a Day., Disp: 60 capsule, Rfl: 2  •  LIVALO 2 MG tablet tablet, TAKE ONE TABLET BY MOUTH ONCE NIGHTLY, Disp: 30 tablet, Rfl: 11  •  pantoprazole (PROTONIX) 40 MG EC tablet, Take 1 tablet by mouth 2 (Two) Times a Day., Disp: 180 tablet, Rfl: 3  •  senna (SENOKOT) 8.6 MG tablet tablet, Take 1 tablet by mouth Every Night., Disp: , Rfl:   •  Xiidra 5 % ophthalmic solution, , Disp: , Rfl:     Dictated utilizing Dragon dictation

## 2021-04-21 NOTE — PATIENT INSTRUCTIONS
"Start magnesium 400mg twice a day (will help with muscle relaxation and constipation).  Stop the senna (this is a stimulant).    Colace (docusate sodium) 100mg 2-3 a day for stool softener.   Add famotidine (Pepcid) 40mg twice a day.  simethicone xtra strength gas x (125-180mg) 2 twice a day as needed for gas.  Pantoprazole/Protonix, 40mg twice daily before breakfast and dinner permanently  famotidine (Pepcid) 40mg twice a day with lunch and at bedtime  Probiotic twice a day helps with bloating, diarrhea and constipation.    Check out the Ovidio's Bread. Less inflammatory.     Don't eat late, don't eat fried or spicy, and don't eat too much at one time. Avoid tomato based products like pizza, lasagna, spaghetti.  If you want to have these take an over the counter Pepcid or TUMS immediately before you eat them.  Do not eat within 3-4 hrs of bedtime. Limit caffeine and carbonated beverages, if you must have them do not have later in the day.  If reflux is severe elevate the head of the bed. You may also use TUMS, maalox, or mylanta for breakthrough heartburn.    Take a daily probiotic (something with a billion cultures and more different strains is better like \"Probiotic 10\" or probiotic gummies) for your gut as well.  AVOID taking NSAIDS (like ibuprofen, Aleve, Motrin, naproxen, meloxicam, etc) as much as possible and use acetaminophen (Tylenol) instead.    Drink lots of water, eat a high fiber diet with 25-30gm a day(check the fiber content in the foods you eat.  Protein bars with 15gm of fiber in each bar are a great supplement daily), and get regular exercise. Use over the counter simethicone xtra strength gas x (125-180mg) 2 twice a day as needed for gas.     High Fiber Food suggestions:    Beans, nuts, vegetables, whole grains, flax seed and darren seeds (which can be stirred into other food) are high in fiber.    Fischer Protein bars from [employer] = 10gm of fiber in each bar (also gluten free)  Quest Protein bars " from grocery stores [employer], [employer], [employer]= 15gm of fiber each (also gluten free)  Fiber One bars from grocery stores = 5-6gm of fiber each  Palomo Bran Buds cereal 1/2 cup = 17gm of fiber  Jose [employer]er brand low sugar Craisins = 13gm of fiber per serving  Melalueca Fiberwise powder=15gm fiber per scoop

## 2021-04-21 NOTE — PROGRESS NOTES
PATIENT INFORMATION  Esther Duke       - 1960    CHIEF COMPLAINT  Chief Complaint   Patient presents with   • Follow-up     3 mo follow up on Constipation       HISTORY OF PRESENT ILLNESS  19 EGD&CS:  reactive gastrop, reflux esoph,  10recall for colon  hx sincere  DX:  GERD, bloating, constipation, fibromyalgia  protonix 40 bid, mag 400mg bid, high fiber diet, trulance, hemp capsules  Hb belching 3 days a week. Constipation is since she was 17 but recent infection with covid caused diarrhea.  Is taking daily senna and discussed that miralax.  Also struggles with fibromyalgia.      Has been throwing up and diarrhea for the last two weeks, sour. Vomiting is worse in the evening.  No blood or coffee ground appearance with emesis.  Prior to this illness constipation for her whole life. Taking 2 probiotics a day.  protonix 40 bid.  Magnesium 400mg bid.  With still some days of constipation.  With the illness has stopped her magnesium.  Was concerned about bowel blockage but is having bms.       She has also been under a lot of stress.      Today:      covid last week of oct. Has had her shot.  trulance threw her into diarrhea.  Feeling some better.  Taking her protonix 40bid and this has helped sour brash.  Symptoms are still every day.  Will add famotidine bid for her. 2 probiotics a day.  Taking senna and colace every day.  Discussed needs to stop senna.  bm once a day and soft formed.         REVIEWED PERTINENT RESULTS/ LABS  Lab Results   Component Value Date    CASEREPORT  2019     Surgical Pathology Report                         Case: [id number]                                  Authorizing Provider:  Deanna Lorenzo MD         Collected:           2019 09:43 AM          Pathologist:           Marge Walker MD  Received:            05/15/2019 05:42 AM          Specimens:   1) - Gastric, epigastric                                                                            2)  "- Small Intestine, small bowel                                                                   3) - Gastric, gastric polyp                                                                         4) - Esophagus, Distal                                                                     FINALDX  05/14/2019     1.  Stomach, Biopsy:    A.  Chemical gastropathy.   B.  Negative for Helicobacter by routine staining.   C.  No metaplasia, dysplasia or malignancy identified.     2.  Small Bowel, Biopsy:  Benign small bowel mucosa with   A. Normal intact villous surface.   B. No significant inflammation, no granulomas.   C. No viral inclusions or other organisms on routinely stained sections.     3.  \"Gastric Polyp\", Biopsy:    A.  Fragment of gastric mucosa with chemical gastropathy.   B.  Benign fundic gland polyp.    C.  No metaplasia, dysplasia or malignancy identified.     4.  Distal Esophagus, Biopsy:    A.  Benign gastroesophageal junction mucosa with reflux esophagitis.   B.  Negative for goblet cell metaplasia and dysplasia.      swm/brb        Lab Results   Component Value Date    HGB 13.6 03/09/2021    MCV 86.9 03/09/2021     03/09/2021    ALT 11 03/09/2021    AST 15 03/09/2021    TRIG 76 03/09/2021      XR Shoulder 2+ View Right    Result Date: 3/24/2021  Impression: Ordering physician's impression is located in the Encounter Note dated 03/24/21.       REVIEW OF SYSTEMS  Review of Systems   All other systems reviewed and are negative.        ACTIVE PROBLEMS  Patient Active Problem List    Diagnosis    • Irritable bowel syndrome with constipation [K58.1]    • Gastroesophageal reflux disease with esophagitis without hemorrhage [K21.00]    • Chronic foot pain, right [M79.671, G89.29]    • Fibromyalgia [M79.7]    • Other hyperlipidemia [E78.49]    • Reactive depression [F32.9]    • Gastroesophageal reflux disease without esophagitis [K21.9]          PAST MEDICAL HISTORY  Past Medical History:   Diagnosis Date "   • Fibromyalgia    • Gastroesophageal reflux disease without esophagitis    • Hernia, hiatal    • High cholesterol    • IBS (irritable bowel syndrome)    • Other hyperlipidemia    • Reactive depression          SURGICAL HISTORY  Past Surgical History:   Procedure Laterality Date   • COLONOSCOPY           FAMILY HISTORY  Family History   Problem Relation Age of Onset   • Hypertension Mother    • Hyperlipidemia Mother    • Hypertension Father    • Hyperlipidemia Father    • Stroke Father    • Cancer Sister    • Aneurysm Maternal Uncle    • Heart disease Maternal Grandmother    • Colon cancer Neg Hx    • Colon polyps Neg Hx          SOCIAL HISTORY  Social History     Occupational History   • Not on file   Tobacco Use   • Smoking status: Never Smoker   • Smokeless tobacco: Never Used   • Tobacco comment: caffeine use   Vaping Use   • Vaping Use: Never used   Substance and Sexual Activity   • Alcohol use: No   • Drug use: No   • Sexual activity: Defer         CURRENT MEDICATIONS    Current Outpatient Medications:   •  DULoxetine (CYMBALTA) 30 MG capsule, TAKE ONE CAPSULE BY MOUTH DAILY, Disp: 30 capsule, Rfl: 4  •  DULoxetine (CYMBALTA) 60 MG capsule, Take 1 capsule by mouth Daily., Disp: 30 capsule, Rfl: 5  •  famotidine (PEPCID) 40 MG tablet, Take 1 tablet by mouth 2 (Two) Times a Day. With lunch and at bedtime, Disp: 180 tablet, Rfl: 3  •  gabapentin (NEURONTIN) 300 MG capsule, Take 1 capsule by mouth 2 (Two) Times a Day., Disp: 60 capsule, Rfl: 2  •  LIVALO 2 MG tablet tablet, TAKE ONE TABLET BY MOUTH ONCE NIGHTLY, Disp: 30 tablet, Rfl: 11  •  pantoprazole (PROTONIX) 40 MG EC tablet, Take 1 tablet by mouth 2 (Two) Times a Day., Disp: 180 tablet, Rfl: 3  •  senna (SENOKOT) 8.6 MG tablet tablet, Take 1 tablet by mouth Every Night., Disp: , Rfl:   •  Xiidra 5 % ophthalmic solution, , Disp: , Rfl:     ALLERGIES  Atorvastatin, Codeine, Crestor [rosuvastatin calcium], Simvastatin, and Sulfa antibiotics    VITALS  Vitals:  "   04/21/21 0857   Temp: 97.2 °F (36.2 °C)   TempSrc: Temporal   Weight: 71.4 kg (157 lb 6.4 oz)   Height: 162.6 cm (64.02\")       PHYSICAL EXAM  Debilities/Disabilities Identified: None  Emotional Behavior: Appropriate  Wt Readings from Last 3 Encounters:   04/21/21 71.4 kg (157 lb 6.4 oz)   03/24/21 69.9 kg (154 lb)   03/09/21 70.3 kg (155 lb)     Ht Readings from Last 1 Encounters:   04/21/21 162.6 cm (64.02\")     Body mass index is 27 kg/m².  Physical Exam  Vitals and nursing note reviewed.   Constitutional:       Appearance: She is well-developed.   HENT:      Head: Normocephalic and atraumatic.   Eyes:      Conjunctiva/sclera: Conjunctivae normal.      Pupils: Pupils are equal, round, and reactive to light.   Cardiovascular:      Rate and Rhythm: Normal rate and regular rhythm.   Pulmonary:      Effort: Pulmonary effort is normal.      Breath sounds: Normal breath sounds.   Abdominal:      General: Bowel sounds are normal. There is distension.      Palpations: Abdomen is soft.      Tenderness: There is abdominal tenderness in the epigastric area.      Comments: Distention improving.    Musculoskeletal:         General: Normal range of motion.      Cervical back: Normal range of motion and neck supple.   Lymphadenopathy:      Cervical: No cervical adenopathy.   Skin:     General: Skin is warm and dry.   Neurological:      Mental Status: She is alert and oriented to person, place, and time.   Psychiatric:         Behavior: Behavior normal.         CLINICAL DATA REVIEWED   reviewed previous lab results and integrated with today's visit, reviewed notes from other physicians and/or last GI encounter, reviewed previous endoscopy results and available photos, reviewed surgical pathology results from previous biopsies    ASSESSMENT  Diagnoses and all orders for this visit:    Chronic idiopathic constipation    Gastroesophageal reflux disease with esophagitis without hemorrhage  -     famotidine (PEPCID) 40 MG tablet; " "Take 1 tablet by mouth 2 (Two) Times a Day. With lunch and at bedtime    Abdominal bloating    Irritable bowel syndrome with constipation  -     famotidine (PEPCID) 40 MG tablet; Take 1 tablet by mouth 2 (Two) Times a Day. With lunch and at bedtime    Dyspepsia  -     famotidine (PEPCID) 40 MG tablet; Take 1 tablet by mouth 2 (Two) Times a Day. With lunch and at bedtime    Fibromyalgia          PLAN  No follow-ups on file.     Start magnesium 400mg twice a day (will help with muscle relaxation and constipation).  Stop the senna (this is a stimulant).    Colace (docusate sodium) 100mg 2-3 a day for stool softener.   Add famotidine (Pepcid) 40mg twice a day.  simethicone xtra strength gas x (125-180mg) 2 twice a day as needed for gas.  Pantoprazole/Protonix, 40mg twice daily before breakfast and dinner permanently  famotidine (Pepcid) 40mg twice a day with lunch and at bedtime  Probiotic twice a day helps with bloating, diarrhea and constipation.    Check out the Ovidio's Bread. Less inflammatory.     Don't eat late, don't eat fried or spicy, and don't eat too much at one time. Avoid tomato based products like pizza, lasagna, spaghetti.  If you want to have these take an over the counter Pepcid or TUMS immediately before you eat them.  Do not eat within 3-4 hrs of bedtime. Limit caffeine and carbonated beverages, if you must have them do not have later in the day.  If reflux is severe elevate the head of the bed. You may also use TUMS, maalox, or mylanta for breakthrough heartburn.    Take a daily probiotic (something with a billion cultures and more different strains is better like \"Probiotic 10\" or probiotic gummies) for your gut as well.  AVOID taking NSAIDS (like ibuprofen, Aleve, Motrin, naproxen, meloxicam, etc) as much as possible and use acetaminophen (Tylenol) instead.    Drink lots of water, eat a high fiber diet with 25-30gm a day(check the fiber content in the foods you eat.  Protein bars with 15gm of fiber " in each bar are a great supplement daily), and get regular exercise. Use over the counter simethicone xtra strength gas x (125-180mg) 2 twice a day as needed for gas.     High Fiber Food suggestions:    Beans, nuts, vegetables, whole grains, flax seed and darren seeds (which can be stirred into other food) are high in fiber.    Fischer Protein bars from [employer] = 10gm of fiber in each bar (also gluten free)  Quest Protein bars from grocery stores [employer], [employer], [employer]= 15gm of fiber each (also gluten free)  Fiber One bars from grocery stores = 5-6gm of fiber each  Kelloggs Bran Buds cereal 1/2 cup = 17gm of fiber  [employer]ger brand low sugar Craisins = 13gm of fiber per serving  Melalueca Fiberwise powder=15gm fiber per scoop    I have discussed the above plan with the patient.  They verbalize understanding and are in agreement with the plan.  They have been advised to contact the office for any questions, concerns, or changes related to their health.    45 min spent with patient today >50% spent counseling about natural history and expected course of assessed complaint and reviewed treatment options that have been tried and not tried and those currently available. Questions answered.

## 2021-06-29 RX ORDER — PITAVASTATIN CALCIUM 2.09 MG/1
TABLET, FILM COATED ORAL
Qty: 30 TABLET | Refills: 10 | Status: SHIPPED | OUTPATIENT
Start: 2021-06-29 | End: 2021-07-12 | Stop reason: SDUPTHER

## 2021-06-30 ENCOUNTER — OFFICE VISIT (OUTPATIENT)
Dept: GASTROENTEROLOGY | Facility: CLINIC | Age: 61
End: 2021-06-30

## 2021-06-30 VITALS
HEIGHT: 64 IN | DIASTOLIC BLOOD PRESSURE: 80 MMHG | BODY MASS INDEX: 26.12 KG/M2 | SYSTOLIC BLOOD PRESSURE: 138 MMHG | WEIGHT: 153 LBS

## 2021-06-30 DIAGNOSIS — K21.9 GASTROESOPHAGEAL REFLUX DISEASE WITHOUT ESOPHAGITIS: Primary | ICD-10-CM

## 2021-06-30 DIAGNOSIS — K58.1 IRRITABLE BOWEL SYNDROME WITH CONSTIPATION: ICD-10-CM

## 2021-06-30 DIAGNOSIS — K21.00 GASTROESOPHAGEAL REFLUX DISEASE WITH ESOPHAGITIS WITHOUT HEMORRHAGE: ICD-10-CM

## 2021-06-30 PROCEDURE — 99213 OFFICE O/P EST LOW 20 MIN: CPT | Performed by: NURSE PRACTITIONER

## 2021-06-30 RX ORDER — SUCRALFATE 1 G/1
1 TABLET ORAL
Qty: 90 TABLET | Refills: 3 | Status: SHIPPED | OUTPATIENT
Start: 2021-06-30 | End: 2022-12-22 | Stop reason: SDUPTHER

## 2021-06-30 NOTE — PATIENT INSTRUCTIONS
"Add Sucralfate/carafate 1gm take 30-60 min after your other medications, right before you lay down at night.  Magnesium 400mg at bedtime.   Pantoprazole/Protonix, 40mg twice daily before breakfast and dinner permanently  famotidine (Pepcid) 40mg twice a day with lunch and at bedtime  Probiotic twice a day helps with bloating, diarrhea and constipation.  Gas x for bloating.    Senna once daily (would be good to cut back on this).   docusate (You may go up to 300mg which is 3 capsules every day).     Low Acid Diet Instructions:   Don't eat late, don't eat fried or spicy, and don't eat too much at one time. Avoid alcohol and  tomato based products like pizza, lasagna, spaghetti.  If you want to have these take an over the counter Pepcid or TUMS immediately before you eat them.  Do not eat within 3-4 hrs of bedtime. Limit caffeine and carbonated beverages, if you must have them do not have later in the day.  If reflux is severe elevate the head of the bed. You may also use TUMS, maalox, or mylanta for breakthrough heartburn.    Take a daily probiotic (something with a billion cultures and more different strains is better, examples like \"Probiotic 10\" or probiotic gummies) for your gut as well.  AVOID taking NSAIDS (like ibuprofen, Aleve, Motrin, naproxen, meloxicam, etc) as much as possible and use acetaminophen (Tylenol) instead.    Drink lots of water, eat a high fiber diet with 25-30gm a day(check the fiber content in the foods you eat.  Protein bars with 15gm of fiber in each bar are a great supplement daily), and get regular exercise. Use over the counter simethicone xtra strength gas x (125-180mg) 2 twice a day as needed for gas.     High Fiber Food suggestions:  Beans, nuts, vegetables, whole grains, flax seed and darren seeds (which can be stirred into other food) are high in fiber.    Amado Protein bars from [employer] = 10gm of fiber in each bar (also gluten free)  Quest Protein bars from grocery stores [employer], " [place], [employer]= 15gm of fiber each (also gluten free)  Fiber One bars from grocery stores = 5-6gm of fiber each  Kelloggs Bran Buds cereal 1/2 cup = 17gm of fiber  [employer]ger brand low sugar Craisins = 13gm of fiber per serving  Melalueca Fiberwise powder found on [employer]=15gm fiber per serving (2 scoops)  CarbBalance tortillas= 15gm of fiber each  Natural Ovens Keto-Friendly Bread found at [facility]=12gm fiber per serving  No Emmett vegan bar at [facility]=15gm fiber per serving

## 2021-06-30 NOTE — PROGRESS NOTES
PATIENT INFORMATION  Esther Duke     - 1960    CHIEF COMPLAINT  Chief Complaint   Patient presents with   • Heartburn   • Irritable Bowel Syndrome     constipation       HISTORY OF PRESENT ILLNESS  19 EGD&CS:  reactive gastrop, reflux esoph,  10recall for colon  hx sincere  DX:  GERD, bloating, constipation, fibromyalgia  protonix 40 bid, mag 400mg bid, high fiber diet, trulance, hemp capsules  Hb belching 3 days a week. Constipation is since she was 17 but recent infection with covid caused diarrhea.  Is taking daily senna and discussed that miralax.  Also struggles with fibromyalgia.       Has been throwing up and diarrhea for the last two weeks, sour. Vomiting is worse in the evening.  No blood or coffee ground appearance with emesis.  Prior to this illness constipation for her whole life. Taking 2 probiotics a day.  protonix 40 bid.  Magnesium 400mg bid.  With still some days of constipation.  With the illness has stopped her magnesium.  Was concerned about bowel blockage but is having bms.       She has also been under a lot of stress.       Last visit:      covid last week of oct. Has had her shot.  trulance threw her into diarrhea.  Feeling some better.  Taking her protonix 40bid and this has helped sour brash.  Symptoms are still every day.  Will add famotidine bid for her. 2 probiotics a day.  Taking senna and colace every day.  Discussed needs to stop senna.  bm once a day and soft formed.      Today: is dealing with high stress with her mom living with her and grieving since her dad .  Mom is OCD.  Is taking probiotics twice a day and is taking senna and docusate every day.  Magnesium 400mg q hs.  bm are every day. Hb and acid reflux not every day now       REVIEWED PERTINENT RESULTS/ LABS  Lab Results   Component Value Date    CASEREPORT  2019     Surgical Pathology Report                         Case: [id number]                                  Authorizing Provider:  Maura  "MD Deanna         Collected:           05/14/2019 09:43 AM          Pathologist:           Marge Walker MD  Received:            05/15/2019 05:42 AM          Specimens:   1) - Gastric, epigastric                                                                            2) - Small Intestine, small bowel                                                                   3) - Gastric, gastric polyp                                                                         4) - Esophagus, Distal                                                                     FINALDX  05/14/2019     1.  Stomach, Biopsy:    A.  Chemical gastropathy.   B.  Negative for Helicobacter by routine staining.   C.  No metaplasia, dysplasia or malignancy identified.     2.  Small Bowel, Biopsy:  Benign small bowel mucosa with   A. Normal intact villous surface.   B. No significant inflammation, no granulomas.   C. No viral inclusions or other organisms on routinely stained sections.     3.  \"Gastric Polyp\", Biopsy:    A.  Fragment of gastric mucosa with chemical gastropathy.   B.  Benign fundic gland polyp.    C.  No metaplasia, dysplasia or malignancy identified.     4.  Distal Esophagus, Biopsy:    A.  Benign gastroesophageal junction mucosa with reflux esophagitis.   B.  Negative for goblet cell metaplasia and dysplasia.      swm/brb        Lab Results   Component Value Date    HGB 13.6 03/09/2021    MCV 86.9 03/09/2021     03/09/2021    ALT 11 03/09/2021    AST 15 03/09/2021    TRIG 76 03/09/2021      No results found.    CURRENT MEDICATIONS    Current Outpatient Medications:   •  DULoxetine (CYMBALTA) 60 MG capsule, Take 1 capsule by mouth Daily., Disp: 30 capsule, Rfl: 5  •  famotidine (PEPCID) 40 MG tablet, Take 1 tablet by mouth 2 (Two) Times a Day. With lunch and at bedtime, Disp: 180 tablet, Rfl: 3  •  gabapentin (NEURONTIN) 300 MG capsule, Take 1 capsule by mouth 2 (Two) Times a Day., Disp: 60 capsule, Rfl: 2  •  " Livalo 2 MG tablet tablet, TAKE ONE TABLET BY MOUTH ONCE NIGHTLY, Disp: 30 tablet, Rfl: 10  •  pantoprazole (PROTONIX) 40 MG EC tablet, Take 1 tablet by mouth 2 (Two) Times a Day., Disp: 180 tablet, Rfl: 3  •  senna (SENOKOT) 8.6 MG tablet tablet, Take 1 tablet by mouth Every Night., Disp: , Rfl:   •  Xiidra 5 % ophthalmic solution, , Disp: , Rfl:   •  sucralfate (Carafate) 1 g tablet, Take 1 tablet by mouth every night at bedtime. If cant swallow, may cut or Crush tablet, 30 min after other meds, before bedtime., Disp: 90 tablet, Rfl: 3    ALLERGIES  Atorvastatin, Codeine, Crestor [rosuvastatin calcium], Simvastatin, and Sulfa antibiotics    REVIEW OF SYSTEMS  ROS: 14 point review of systems was performed and all other systems were reviewed and are negative except for documented findings in HPI and today's encounter.   Review of Systems   Constitutional: Negative for activity change, chills, fever and unexpected weight change.   HENT: Negative for congestion.    Eyes: Negative for visual disturbance.   Respiratory: Negative for shortness of breath.    Cardiovascular: Negative for chest pain and palpitations.   Gastrointestinal: Positive for abdominal distention, abdominal pain and constipation. Negative for blood in stool.   Endocrine: Negative for cold intolerance and heat intolerance.   Genitourinary: Negative for hematuria.   Musculoskeletal: Negative for gait problem.   Skin: Negative for color change.   Allergic/Immunologic: Negative for immunocompromised state.   Neurological: Negative for weakness and light-headedness.   Hematological: Negative for adenopathy.   Psychiatric/Behavioral: Negative for sleep disturbance. The patient is not nervous/anxious.          History     Last Reviewed by Ivonne Acuna APRN on 6/30/2021 at 10:28 AM    Sections Reviewed    Tobacco, Family, Medical, Surgical      Problem list reviewed by Ivonne Acuna APRN on 6/30/2021 at 10:28 AM  Medicines reviewed by Ivonne Acuna,  MARIANELA on 6/30/2021 at 10:28 AM  Allergies reviewed by Ivonne Acuna APRN on 6/30/2021 at 10:28 AM      ACTIVE PROBLEMS  Patient Active Problem List    Diagnosis    • Irritable bowel syndrome with constipation [K58.1]    • Gastroesophageal reflux disease with esophagitis without hemorrhage [K21.00]    • Chronic foot pain, right [M79.671, G89.29]    • Fibromyalgia [M79.7]    • Other hyperlipidemia [E78.49]    • Reactive depression [F32.9]          PAST MEDICAL HISTORY  Past Medical History:   Diagnosis Date   • Fibromyalgia    • Gastroesophageal reflux disease without esophagitis    • Hernia, hiatal    • High cholesterol    • IBS (irritable bowel syndrome)    • Other hyperlipidemia    • Reactive depression          SURGICAL HISTORY  Past Surgical History:   Procedure Laterality Date   • COLONOSCOPY           FAMILY HISTORY  Family History   Problem Relation Age of Onset   • Hypertension Mother    • Hyperlipidemia Mother    • Hypertension Father    • Hyperlipidemia Father    • Stroke Father    • Cancer Sister    • Aneurysm Maternal Uncle    • Heart disease Maternal Grandmother    • Colon cancer Neg Hx    • Colon polyps Neg Hx          SOCIAL HISTORY  Social History     Occupational History   • Not on file   Tobacco Use   • Smoking status: Never Smoker   • Smokeless tobacco: Never Used   • Tobacco comment: caffeine use   Vaping Use   • Vaping Use: Never used   Substance and Sexual Activity   • Alcohol use: No   • Drug use: No   • Sexual activity: Defer         LAST RESULTS  See also labs reviewed above.   Office Visit on 03/09/2021   Component Date Value Ref Range Status   • WBC 03/09/2021 4.79  3.40 - 10.80 10*3/mm3 Final   • RBC 03/09/2021 4.75  3.77 - 5.28 10*6/mm3 Final   • Hemoglobin 03/09/2021 13.6  12.0 - 15.9 g/dL Final   • Hematocrit 03/09/2021 41.3  34.0 - 46.6 % Final   • MCV 03/09/2021 86.9  79.0 - 97.0 fL Final   • MCH 03/09/2021 28.6  26.6 - 33.0 pg Final   • MCHC 03/09/2021 32.9  31.5 - 35.7 g/dL Final    • RDW 03/09/2021 13.6  12.3 - 15.4 % Final   • Platelets 03/09/2021 363  140 - 450 10*3/mm3 Final   • Neutrophil Rel % 03/09/2021 51.8  42.7 - 76.0 % Final   • Lymphocyte Rel % 03/09/2021 30.7  19.6 - 45.3 % Final   • Monocyte Rel % 03/09/2021 9.4  5.0 - 12.0 % Final   • Eosinophil Rel % 03/09/2021 6.9* 0.3 - 6.2 % Final   • Basophil Rel % 03/09/2021 1.0  0.0 - 1.5 % Final   • Neutrophils Absolute 03/09/2021 2.48  1.70 - 7.00 10*3/mm3 Final   • Lymphocytes Absolute 03/09/2021 1.47  0.70 - 3.10 10*3/mm3 Final   • Monocytes Absolute 03/09/2021 0.45  0.10 - 0.90 10*3/mm3 Final   • Eosinophils Absolute 03/09/2021 0.33  0.00 - 0.40 10*3/mm3 Final   • Basophils Absolute 03/09/2021 0.05  0.00 - 0.20 10*3/mm3 Final   • Immature Granulocyte Rel % 03/09/2021 0.2  0.0 - 0.5 % Final   • Immature Grans Absolute 03/09/2021 0.01  0.00 - 0.05 10*3/mm3 Final   • nRBC 03/09/2021 0.0  0.0 - 0.2 /100 WBC Final   • Creatine Kinase 03/09/2021 56  20 - 180 U/L Final   • Glucose 03/09/2021 97  65 - 99 mg/dL Final   • BUN 03/09/2021 10  6 - 20 mg/dL Final   • Creatinine 03/09/2021 0.65  0.57 - 1.00 mg/dL Final   • eGFR Non  Am 03/09/2021 93  >60 mL/min/1.73 Final    Comment: GFR Normal >60  Chronic Kidney Disease <60  Kidney Failure <15     • eGFR  Am 03/09/2021 113  >60 mL/min/1.73 Final   • BUN/Creatinine Ratio 03/09/2021 15.4  7.0 - 25.0 Final   • Sodium 03/09/2021 141  136 - 145 mmol/L Final   • Potassium 03/09/2021 5.0  3.5 - 5.2 mmol/L Final   • Chloride 03/09/2021 103  98 - 107 mmol/L Final   • Total CO2 03/09/2021 29.1* 22.0 - 29.0 mmol/L Final   • Calcium 03/09/2021 9.6  8.6 - 10.5 mg/dL Final   • Total Protein 03/09/2021 6.3  6.0 - 8.5 g/dL Final   • Albumin 03/09/2021 4.30  3.50 - 5.20 g/dL Final   • Globulin 03/09/2021 2.0  gm/dL Final   • A/G Ratio 03/09/2021 2.2  g/dL Final   • Total Bilirubin 03/09/2021 0.2  0.0 - 1.2 mg/dL Final   • Alkaline Phosphatase 03/09/2021 79  39 - 117 U/L Final   • AST (SGOT)  "03/09/2021 15  1 - 32 U/L Final   • ALT (SGPT) 03/09/2021 11  1 - 33 U/L Final   • Total Cholesterol 03/09/2021 238* 0 - 200 mg/dL Final    Comment: Cholesterol Reference Ranges  (U.S. Department of Health and Human Services ATP III  Classifications)  Desirable          <200 mg/dL  Borderline High    200-239 mg/dL  High Risk          >240 mg/dL  Triglyceride Reference Ranges  (U.S. Department of Health and Human Services ATP III  Classifications)  Normal           <150 mg/dL  Borderline High  150-199 mg/dL  High             200-499 mg/dL  Very High        >500 mg/dL  HDL Reference Ranges  (U.S. Department of Health and Human Services ATP III  Classifcations)  Low     <40 mg/dl (major risk factor for CHD)  High    >60 mg/dl ('negative' risk factor for CHD)  LDL Reference Ranges  (U.S. Department of Health and Human Services ATP III  Classifcations)  Optimal          <100 mg/dL  Near Optimal     100-129 mg/dL  Borderline High  130-159 mg/dL  High             160-189 mg/dL  Very High        >189 mg/dL     • Triglycerides 03/09/2021 76  0 - 150 mg/dL Final   • HDL Cholesterol 03/09/2021 82* 40 - 60 mg/dL Final   • VLDL Cholesterol Emmett 03/09/2021 13  5 - 40 mg/dL Final   • LDL Chol Calc (NIH) 03/09/2021 143* 0 - 100 mg/dL Final   • Chol/HDL Ratio 03/09/2021 2.90   Final   • TSH 03/09/2021 2.990  0.270 - 4.200 uIU/mL Final     No results found.    PHYSICAL EXAM  Debilities/Disabilities Identified: None  Emotional Behavior: Appropriate  61 y.o. female  Wt Readings from Last 3 Encounters:   06/30/21 69.4 kg (153 lb)   04/21/21 69.4 kg (153 lb)   04/21/21 71.4 kg (157 lb 6.4 oz)     Ht Readings from Last 1 Encounters:   06/30/21 162.6 cm (64\")     Body mass index is 26.26 kg/m².  Vitals:    06/30/21 0951   BP: 138/80   BP Location: Left arm   Patient Position: Sitting   Cuff Size: Adult   Weight: 69.4 kg (153 lb)   Height: 162.6 cm (64\")     Vital signs reviewed.          Physical Exam  Vitals and nursing note reviewed. "   Constitutional:       Appearance: She is well-developed.   HENT:      Head: Normocephalic and atraumatic.   Eyes:      Conjunctiva/sclera: Conjunctivae normal.      Pupils: Pupils are equal, round, and reactive to light.   Cardiovascular:      Rate and Rhythm: Normal rate and regular rhythm.   Pulmonary:      Effort: Pulmonary effort is normal.      Breath sounds: Normal breath sounds.   Abdominal:      General: Bowel sounds are normal. There is no distension.      Palpations: Abdomen is soft.      Tenderness: There is abdominal tenderness in the right upper quadrant, epigastric area and left upper quadrant.   Musculoskeletal:         General: Normal range of motion.      Cervical back: Normal range of motion and neck supple.   Lymphadenopathy:      Cervical: No cervical adenopathy.   Skin:     General: Skin is warm and dry.   Neurological:      Mental Status: She is alert and oriented to person, place, and time.   Psychiatric:         Behavior: Behavior normal.           CLINICAL DATA REVIEWED   reviewed previous lab results and integrated with today's visit, reviewed notes from other physicians and/or last GI encounter, reviewed previous endoscopy results and available photos, reviewed surgical pathology results from previous biopsies      ASSESSMENT  Diagnoses and all orders for this visit:    Gastroesophageal reflux disease without esophagitis  -     sucralfate (Carafate) 1 g tablet; Take 1 tablet by mouth every night at bedtime. If cant swallow, may cut or Crush tablet, 30 min after other meds, before bedtime.    Irritable bowel syndrome with constipation    Gastroesophageal reflux disease with esophagitis without hemorrhage           PLAN  Return in about 2 months (around 8/30/2021).     Add Sucralfate/carafate 1gm take 30-60 min after your other medications, right before you lay down at night.  Magnesium 400mg at bedtime.   Pantoprazole/Protonix, 40mg twice daily before breakfast and dinner  permanently  famotidine (Pepcid) 40mg twice a day with lunch and at bedtime  Probiotic twice a day helps with bloating, diarrhea and constipation.  Gas x for bloating.    Senna once daily (would be good to cut back on this).   docusate (You may go up to 300mg which is 3 capsules every day).     I have discussed the above plan with the patient.  They verbalize understanding and are in agreement with the plan.  They have been advised to contact the office for any questions, concerns, or changes related to their health.    25 min spent with patient today >50% spent counseling about natural history and expected course of assessed complaint and reviewed treatment options that have been tried and not tried and those currently available. Questions answered.

## 2021-07-12 ENCOUNTER — OFFICE VISIT (OUTPATIENT)
Dept: FAMILY MEDICINE CLINIC | Facility: CLINIC | Age: 61
End: 2021-07-12

## 2021-07-12 VITALS
RESPIRATION RATE: 16 BRPM | SYSTOLIC BLOOD PRESSURE: 138 MMHG | OXYGEN SATURATION: 98 % | HEIGHT: 64 IN | BODY MASS INDEX: 26.98 KG/M2 | HEART RATE: 80 BPM | WEIGHT: 158 LBS | DIASTOLIC BLOOD PRESSURE: 78 MMHG | TEMPERATURE: 98 F

## 2021-07-12 DIAGNOSIS — E78.49 OTHER HYPERLIPIDEMIA: Primary | ICD-10-CM

## 2021-07-12 DIAGNOSIS — K21.00 GASTROESOPHAGEAL REFLUX DISEASE WITH ESOPHAGITIS WITHOUT HEMORRHAGE: ICD-10-CM

## 2021-07-12 DIAGNOSIS — F32.9 REACTIVE DEPRESSION: ICD-10-CM

## 2021-07-12 DIAGNOSIS — M79.7 FIBROMYALGIA: ICD-10-CM

## 2021-07-12 PROCEDURE — 99214 OFFICE O/P EST MOD 30 MIN: CPT | Performed by: INTERNAL MEDICINE

## 2021-07-12 RX ORDER — DULOXETIN HYDROCHLORIDE 60 MG/1
60 CAPSULE, DELAYED RELEASE ORAL DAILY
Qty: 30 CAPSULE | Refills: 5 | Status: SHIPPED | OUTPATIENT
Start: 2021-07-12 | End: 2021-09-28 | Stop reason: SDUPTHER

## 2021-07-12 RX ORDER — DULOXETIN HYDROCHLORIDE 30 MG/1
30 CAPSULE, DELAYED RELEASE ORAL DAILY
Qty: 30 CAPSULE | Refills: 5 | Status: SHIPPED | OUTPATIENT
Start: 2021-07-12 | End: 2022-02-14

## 2021-07-12 NOTE — PROGRESS NOTES
Subjective   Esther Duke is a 61 y.o. female.     Chief Complaint   Patient presents with   • Hyperlipidemia     3 month f/u    GERD, depression, fibromyalgia    History of Present Illness   Patient here follow-up for hyperlipidemia, fibromyalgia, GERD, reactive depression, peripheral neuropathy.  Denies any chest pain or shortness of breath.  No nausea vomiting.  Went to the list of all medications.  At times she is depressed, currently taking 90 of the Cymbalta.  Reports she was out of Livalo for 2 weeks.  The following portions of the patient's history were reviewed and updated as appropriate: allergies, current medications, past family history, past medical history, past social history, past surgical history and problem list.    Review of Systems   Constitutional: Negative for activity change, appetite change, fatigue and fever.   Eyes: Negative for blurred vision and double vision.   Respiratory: Negative.  Negative for shortness of breath.    Cardiovascular: Negative for chest pain, palpitations and leg swelling.   Gastrointestinal: Negative.    Endocrine: Negative.    Genitourinary: Negative for frequency and hematuria.   Musculoskeletal: Positive for myalgias.   Neurological: Negative for dizziness, syncope, light-headedness and headache.   Psychiatric/Behavioral: Negative for agitation, behavioral problems and suicidal ideas.       Allergies   Allergen Reactions   • Atorvastatin Myalgia   • Codeine GI Intolerance   • Crestor [Rosuvastatin Calcium] Myalgia   • Simvastatin Myalgia   • Sulfa Antibiotics Hives       Current Outpatient Medications on File Prior to Visit   Medication Sig Dispense Refill   • famotidine (PEPCID) 40 MG tablet Take 1 tablet by mouth 2 (Two) Times a Day. With lunch and at bedtime 180 tablet 3   • gabapentin (NEURONTIN) 300 MG capsule Take 1 capsule by mouth 2 (Two) Times a Day. 60 capsule 2   • pantoprazole (PROTONIX) 40 MG EC tablet Take 1 tablet by mouth 2 (Two) Times a Day.  180 tablet 3   • senna (SENOKOT) 8.6 MG tablet tablet Take 1 tablet by mouth Every Night.     • sucralfate (Carafate) 1 g tablet Take 1 tablet by mouth every night at bedtime. If cant swallow, may cut or Crush tablet, 30 min after other meds, before bedtime. 90 tablet 3   • Xiidra 5 % ophthalmic solution      • [DISCONTINUED] DULoxetine (CYMBALTA) 60 MG capsule Take 1 capsule by mouth Daily. 30 capsule 5   • [DISCONTINUED] Livalo 2 MG tablet tablet TAKE ONE TABLET BY MOUTH ONCE NIGHTLY 30 tablet 10     No current facility-administered medications on file prior to visit.       Family History   Problem Relation Age of Onset   • Hypertension Mother    • Hyperlipidemia Mother    • Hypertension Father    • Hyperlipidemia Father    • Stroke Father    • Cancer Sister    • Aneurysm Maternal Uncle    • Heart disease Maternal Grandmother    • Colon cancer Neg Hx    • Colon polyps Neg Hx        Past Medical History:   Diagnosis Date   • Fibromyalgia    • Gastroesophageal reflux disease without esophagitis    • Hernia, hiatal    • High cholesterol    • IBS (irritable bowel syndrome)    • Other hyperlipidemia    • Reactive depression        Past Surgical History:   Procedure Laterality Date   • COLONOSCOPY         Social History     Socioeconomic History   • Marital status: Unknown     Spouse name: Not on file   • Number of children: Not on file   • Years of education: Not on file   • Highest education level: Not on file   Tobacco Use   • Smoking status: Never Smoker   • Smokeless tobacco: Never Used   • Tobacco comment: caffeine use   Vaping Use   • Vaping Use: Never used   Substance and Sexual Activity   • Alcohol use: No   • Drug use: No   • Sexual activity: Defer       Patient Active Problem List   Diagnosis   • Fibromyalgia   • Other hyperlipidemia   • Reactive depression   • Chronic foot pain, right   • Irritable bowel syndrome with constipation   • Gastroesophageal reflux disease with esophagitis without hemorrhage  "      /78   Pulse 80   Temp 98 °F (36.7 °C)   Resp 16   Ht 162.6 cm (64\")   Wt 71.7 kg (158 lb)   SpO2 98%   BMI 27.12 kg/m²   Body mass index is 27.12 kg/m².    Objective   Physical Exam  Vitals and nursing note reviewed.   Constitutional:       Appearance: She is well-developed.   Eyes:      Pupils: Pupils are equal, round, and reactive to light.   Neck:      Thyroid: No thyromegaly.      Vascular: No JVD.      Trachea: No tracheal deviation.   Cardiovascular:      Rate and Rhythm: Normal rate and regular rhythm.      Heart sounds: No murmur heard.     Pulmonary:      Effort: Pulmonary effort is normal. No respiratory distress.      Breath sounds: Normal breath sounds. No wheezing.   Abdominal:      General: Bowel sounds are normal. There is no distension.      Palpations: Abdomen is soft. There is no mass.      Tenderness: There is no abdominal tenderness. There is no right CVA tenderness, left CVA tenderness, guarding or rebound.      Hernia: No hernia is present.   Musculoskeletal:      Cervical back: Normal range of motion and neck supple.   Lymphadenopathy:      Cervical: No cervical adenopathy.   Neurological:      General: No focal deficit present.      Mental Status: She is alert and oriented to person, place, and time. Mental status is at baseline.      Cranial Nerves: No cranial nerve deficit.      Sensory: No sensory deficit.      Motor: No weakness.      Coordination: Coordination normal.      Gait: Gait normal.      Deep Tendon Reflexes: Reflexes normal.   Psychiatric:         Mood and Affect: Mood normal.         Behavior: Behavior normal.           Assessment/Plan   Diagnoses and all orders for this visit:    1. Other hyperlipidemia (Primary)  -     Comprehensive Metabolic Panel  -     CK  -     Lipid Panel With / Chol / HDL Ratio    2. Reactive depression  -     Comprehensive Metabolic Panel  -     CK  -     Lipid Panel With / Chol / HDL Ratio    3. Fibromyalgia  -     Comprehensive " Metabolic Panel  -     CK  -     Lipid Panel With / Chol / HDL Ratio    4. Gastroesophageal reflux disease with esophagitis without hemorrhage  -     Comprehensive Metabolic Panel  -     CK  -     Lipid Panel With / Chol / HDL Ratio    Other orders  -     pitavastatin calcium (Livalo) 2 MG tablet tablet; Take 1 tablet by mouth Every Night.  Dispense: 30 tablet; Refill: 5  -     DULoxetine (CYMBALTA) 60 MG capsule; Take 1 capsule by mouth Daily.  Dispense: 30 capsule; Refill: 5  -     DULoxetine (CYMBALTA) 30 MG capsule; Take 1 capsule by mouth Daily. Takes total 90 mg  Dispense: 30 capsule; Refill: 5    Continue diet and exercise.  Continue all current medications.  Patient takes Cymbalta 90, she is on Carafate, Pepcid and Protonix for her GERD symptoms.  Taking Neurontin regularly.  Taking Livalo but has been out of the medicine for 2 weeks.  Diet and exercise.  Return in 3 months time.  All her problems are chronic and stable.  Discussed with patient if depression continues to get worse she needs to follow-up with psychiatrist.  Lose weight.

## 2021-07-13 DIAGNOSIS — M79.7 FIBROMYALGIA: ICD-10-CM

## 2021-07-13 LAB
ALBUMIN SERPL-MCNC: 4.6 G/DL (ref 3.5–5.2)
ALBUMIN/GLOB SERPL: 2.3 G/DL
ALP SERPL-CCNC: 79 U/L (ref 39–117)
ALT SERPL-CCNC: 12 U/L (ref 1–33)
AST SERPL-CCNC: 14 U/L (ref 1–32)
BILIRUB SERPL-MCNC: 0.2 MG/DL (ref 0–1.2)
BUN SERPL-MCNC: 8 MG/DL (ref 8–23)
BUN/CREAT SERPL: 10.8 (ref 7–25)
CALCIUM SERPL-MCNC: 10.1 MG/DL (ref 8.6–10.5)
CHLORIDE SERPL-SCNC: 103 MMOL/L (ref 98–107)
CHOLEST SERPL-MCNC: 277 MG/DL (ref 0–200)
CHOLEST/HDLC SERPL: 3.6 {RATIO}
CK SERPL-CCNC: 56 U/L (ref 20–180)
CO2 SERPL-SCNC: 26.6 MMOL/L (ref 22–29)
CREAT SERPL-MCNC: 0.74 MG/DL (ref 0.57–1)
GLOBULIN SER CALC-MCNC: 2 GM/DL
GLUCOSE SERPL-MCNC: 100 MG/DL (ref 65–99)
HDLC SERPL-MCNC: 77 MG/DL (ref 40–60)
LDLC SERPL CALC-MCNC: 189 MG/DL (ref 0–100)
POTASSIUM SERPL-SCNC: 4.7 MMOL/L (ref 3.5–5.2)
PROT SERPL-MCNC: 6.6 G/DL (ref 6–8.5)
SODIUM SERPL-SCNC: 138 MMOL/L (ref 136–145)
TRIGL SERPL-MCNC: 72 MG/DL (ref 0–150)
VLDLC SERPL CALC-MCNC: 11 MG/DL (ref 5–40)

## 2021-07-14 ENCOUNTER — TRANSCRIBE ORDERS (OUTPATIENT)
Dept: ADMINISTRATIVE | Facility: HOSPITAL | Age: 61
End: 2021-07-14

## 2021-07-14 DIAGNOSIS — Z13.6 ENCOUNTER FOR SCREENING FOR VASCULAR DISEASE: Primary | ICD-10-CM

## 2021-07-14 RX ORDER — GABAPENTIN 300 MG/1
CAPSULE ORAL
Qty: 60 CAPSULE | Refills: 3 | Status: SHIPPED | OUTPATIENT
Start: 2021-07-14 | End: 2021-11-12

## 2021-09-28 RX ORDER — DULOXETIN HYDROCHLORIDE 60 MG/1
60 CAPSULE, DELAYED RELEASE ORAL DAILY
Qty: 30 CAPSULE | Refills: 5 | Status: SHIPPED | OUTPATIENT
Start: 2021-09-28 | End: 2022-08-02 | Stop reason: SDUPTHER

## 2021-10-19 ENCOUNTER — OFFICE VISIT (OUTPATIENT)
Dept: FAMILY MEDICINE CLINIC | Facility: CLINIC | Age: 61
End: 2021-10-19

## 2021-10-19 VITALS
BODY MASS INDEX: 27.83 KG/M2 | HEART RATE: 73 BPM | DIASTOLIC BLOOD PRESSURE: 78 MMHG | OXYGEN SATURATION: 98 % | HEIGHT: 64 IN | SYSTOLIC BLOOD PRESSURE: 112 MMHG | TEMPERATURE: 97.1 F | WEIGHT: 163 LBS

## 2021-10-19 DIAGNOSIS — E78.49 OTHER HYPERLIPIDEMIA: Primary | ICD-10-CM

## 2021-10-19 DIAGNOSIS — E55.9 VITAMIN D DEFICIENCY: ICD-10-CM

## 2021-10-19 DIAGNOSIS — M79.7 FIBROMYALGIA: ICD-10-CM

## 2021-10-19 DIAGNOSIS — F32.9 REACTIVE DEPRESSION: ICD-10-CM

## 2021-10-19 DIAGNOSIS — K21.00 GASTROESOPHAGEAL REFLUX DISEASE WITH ESOPHAGITIS WITHOUT HEMORRHAGE: ICD-10-CM

## 2021-10-19 PROCEDURE — 99214 OFFICE O/P EST MOD 30 MIN: CPT | Performed by: INTERNAL MEDICINE

## 2021-10-19 NOTE — PROGRESS NOTES
Subjective   Esther Duke is a 61 y.o. female.     Chief Complaint   Patient presents with   • Other hyperlipidemia (Primary)   • Reactive depression   • Fibromyalgia   • Gastroesophageal reflux disease with esophagitis without hem       History of Present Illness   Patient here follow-up for hyperlipidemia, depression, fibromyalgia, GERD.  Denies any chest pain or shortness of breath.  Taking Neurontin is helping her.  She is taking the Livalo 2 mg daily.  Trying to diet and exercise.  Blood pressure under control.  Went to the list of medicines she is taking.  Depression under control.  GERD symptoms are under control.  The following portions of the patient's history were reviewed and updated as appropriate: allergies, current medications, past family history, past medical history, past social history, past surgical history and problem list.    Review of Systems   Constitutional: Negative for activity change, appetite change, fatigue and fever.   Eyes: Negative for blurred vision and double vision.   Respiratory: Negative.  Negative for shortness of breath.    Cardiovascular: Negative for chest pain, palpitations and leg swelling.   Gastrointestinal: Negative.    Genitourinary: Negative for hematuria.   Neurological: Negative for dizziness, syncope, light-headedness and headache.   Psychiatric/Behavioral: Negative.        Allergies   Allergen Reactions   • Atorvastatin Myalgia   • Codeine GI Intolerance   • Crestor [Rosuvastatin Calcium] Myalgia   • Simvastatin Myalgia   • Sulfa Antibiotics Hives       Current Outpatient Medications on File Prior to Visit   Medication Sig Dispense Refill   • DULoxetine (CYMBALTA) 30 MG capsule Take 1 capsule by mouth Daily. Takes total 90 mg 30 capsule 5   • DULoxetine (CYMBALTA) 60 MG capsule Take 1 capsule by mouth Daily. 30 capsule 5   • famotidine (PEPCID) 40 MG tablet Take 1 tablet by mouth 2 (Two) Times a Day. With lunch and at bedtime 180 tablet 3   • gabapentin  (NEURONTIN) 300 MG capsule TAKE ONE CAPSULE BY MOUTH TWICE A DAY 60 capsule 3   • pantoprazole (PROTONIX) 40 MG EC tablet Take 1 tablet by mouth 2 (Two) Times a Day. 180 tablet 3   • pitavastatin calcium (Livalo) 2 MG tablet tablet Take 1 tablet by mouth Every Night. 30 tablet 5   • senna (SENOKOT) 8.6 MG tablet tablet Take 1 tablet by mouth Every Night.     • sucralfate (Carafate) 1 g tablet Take 1 tablet by mouth every night at bedtime. If cant swallow, may cut or Crush tablet, 30 min after other meds, before bedtime. 90 tablet 3   • Xiidra 5 % ophthalmic solution        No current facility-administered medications on file prior to visit.       Family History   Problem Relation Age of Onset   • Hypertension Mother    • Hyperlipidemia Mother    • Hypertension Father    • Hyperlipidemia Father    • Stroke Father    • Cancer Sister    • Aneurysm Maternal Uncle    • Heart disease Maternal Grandmother    • Colon cancer Neg Hx    • Colon polyps Neg Hx        Past Medical History:   Diagnosis Date   • Fibromyalgia    • Gastroesophageal reflux disease without esophagitis    • Hernia, hiatal    • High cholesterol    • IBS (irritable bowel syndrome)    • Other hyperlipidemia    • Reactive depression        Past Surgical History:   Procedure Laterality Date   • COLONOSCOPY         Social History     Socioeconomic History   • Marital status:    Tobacco Use   • Smoking status: Never Smoker   • Smokeless tobacco: Never Used   • Tobacco comment: caffeine use   Vaping Use   • Vaping Use: Never used   Substance and Sexual Activity   • Alcohol use: No   • Drug use: No   • Sexual activity: Defer       Patient Active Problem List   Diagnosis   • Fibromyalgia   • Other hyperlipidemia   • Reactive depression   • Chronic foot pain, right   • Irritable bowel syndrome with constipation   • Gastroesophageal reflux disease with esophagitis without hemorrhage       /78 (BP Location: Left arm, Patient Position: Sitting, Cuff  "Size: Adult)   Pulse 73   Temp 97.1 °F (36.2 °C) (Temporal)   Ht 162.6 cm (64\")   Wt 73.9 kg (163 lb)   SpO2 98%   BMI 27.98 kg/m²   Body mass index is 27.98 kg/m².    Objective   Physical Exam  Vitals and nursing note reviewed.   Constitutional:       Appearance: She is well-developed.   Eyes:      Pupils: Pupils are equal, round, and reactive to light.   Neck:      Thyroid: No thyromegaly.      Vascular: No JVD.      Trachea: No tracheal deviation.   Cardiovascular:      Rate and Rhythm: Normal rate and regular rhythm.      Heart sounds: No murmur heard.      Pulmonary:      Effort: Pulmonary effort is normal. No respiratory distress.      Breath sounds: Normal breath sounds. No wheezing.   Abdominal:      General: Bowel sounds are normal. There is no distension.      Palpations: Abdomen is soft. There is no mass.      Tenderness: There is no abdominal tenderness. There is no right CVA tenderness, left CVA tenderness, guarding or rebound.      Hernia: No hernia is present.   Musculoskeletal:      Cervical back: Normal range of motion and neck supple.   Lymphadenopathy:      Cervical: No cervical adenopathy.   Neurological:      General: No focal deficit present.      Mental Status: She is alert and oriented to person, place, and time. Mental status is at baseline.      Cranial Nerves: No cranial nerve deficit.      Sensory: No sensory deficit.      Motor: No weakness.      Coordination: Coordination normal.      Gait: Gait normal.      Deep Tendon Reflexes: Reflexes normal.   Psychiatric:         Mood and Affect: Mood normal.         Behavior: Behavior normal.           Assessment/Plan   Diagnoses and all orders for this visit:    1. Other hyperlipidemia (Primary)  -     CBC & Differential  -     CK  -     Comprehensive Metabolic Panel  -     Lipid Panel With / Chol / HDL Ratio  -     Vitamin B12  -     TSH  -     Vitamin D 25 Hydroxy    2. Reactive depression  -     CBC & Differential  -     CK  -     " Comprehensive Metabolic Panel  -     Lipid Panel With / Chol / HDL Ratio  -     Vitamin B12  -     TSH  -     Vitamin D 25 Hydroxy    3. Gastroesophageal reflux disease with esophagitis without hemorrhage  -     CBC & Differential  -     CK  -     Comprehensive Metabolic Panel  -     Lipid Panel With / Chol / HDL Ratio  -     Vitamin B12  -     TSH  -     Vitamin D 25 Hydroxy    4. Fibromyalgia    5. Vitamin D deficiency  -     CBC & Differential  -     CK  -     Comprehensive Metabolic Panel  -     Lipid Panel With / Chol / HDL Ratio  -     Vitamin B12  -     TSH  -     Vitamin D 25 Hydroxy    Continue diet and exercise.  Continue taking all current medication.  Check blood pressure.  Protonix helping her GERD symptoms.  She is taking Neurontin is helping her fibromyalgia.  Cymbalta is keeping her depression under control.  Patient is also on Livalo 2 mg daily.  All problems are chronic and stable.  Diet and exercise lose weight.  Return in 3 months time.\  EHR dragon/transcription disclaimer:  Part of this note are created by electronic transcription/translation of spoken language to printed text and thus may lead to erroneous, or at times, nonsensical words or phrases inadvertently transcribed.  Although I have reviewed for such errors, some may still exist.

## 2021-10-20 DIAGNOSIS — E87.5 HYPERKALEMIA: Primary | ICD-10-CM

## 2021-10-20 LAB
25(OH)D3+25(OH)D2 SERPL-MCNC: 22.9 NG/ML (ref 30–100)
ALBUMIN SERPL-MCNC: 4.3 G/DL (ref 3.8–4.9)
ALBUMIN/GLOB SERPL: 1.9 {RATIO} (ref 1.2–2.2)
ALP SERPL-CCNC: 86 IU/L (ref 44–121)
ALT SERPL-CCNC: 14 IU/L (ref 0–32)
AST SERPL-CCNC: 13 IU/L (ref 0–40)
BASOPHILS # BLD AUTO: 0.1 X10E3/UL (ref 0–0.2)
BASOPHILS NFR BLD AUTO: 1 %
BILIRUB SERPL-MCNC: 0.2 MG/DL (ref 0–1.2)
BUN SERPL-MCNC: 11 MG/DL (ref 8–27)
BUN/CREAT SERPL: 14 (ref 12–28)
CALCIUM SERPL-MCNC: 9.7 MG/DL (ref 8.7–10.3)
CHLORIDE SERPL-SCNC: 101 MMOL/L (ref 96–106)
CHOLEST SERPL-MCNC: 223 MG/DL (ref 100–199)
CHOLEST/HDLC SERPL: 2.9 RATIO (ref 0–4.4)
CK SERPL-CCNC: 53 U/L (ref 32–182)
CO2 SERPL-SCNC: 26 MMOL/L (ref 20–29)
CREAT SERPL-MCNC: 0.78 MG/DL (ref 0.57–1)
EOSINOPHIL # BLD AUTO: 0.6 X10E3/UL (ref 0–0.4)
EOSINOPHIL NFR BLD AUTO: 11 %
ERYTHROCYTE [DISTWIDTH] IN BLOOD BY AUTOMATED COUNT: 13.6 % (ref 11.7–15.4)
GLOBULIN SER CALC-MCNC: 2.3 G/DL (ref 1.5–4.5)
GLUCOSE SERPL-MCNC: 99 MG/DL (ref 65–99)
HCT VFR BLD AUTO: 41.8 % (ref 34–46.6)
HDLC SERPL-MCNC: 78 MG/DL
HGB BLD-MCNC: 13.3 G/DL (ref 11.1–15.9)
IMM GRANULOCYTES # BLD AUTO: 0 X10E3/UL (ref 0–0.1)
IMM GRANULOCYTES NFR BLD AUTO: 0 %
LDLC SERPL CALC-MCNC: 131 MG/DL (ref 0–99)
LYMPHOCYTES # BLD AUTO: 1.6 X10E3/UL (ref 0.7–3.1)
LYMPHOCYTES NFR BLD AUTO: 29 %
MCH RBC QN AUTO: 27.5 PG (ref 26.6–33)
MCHC RBC AUTO-ENTMCNC: 31.8 G/DL (ref 31.5–35.7)
MCV RBC AUTO: 86 FL (ref 79–97)
MONOCYTES # BLD AUTO: 0.6 X10E3/UL (ref 0.1–0.9)
MONOCYTES NFR BLD AUTO: 10 %
NEUTROPHILS # BLD AUTO: 2.7 X10E3/UL (ref 1.4–7)
NEUTROPHILS NFR BLD AUTO: 49 %
PLATELET # BLD AUTO: 374 X10E3/UL (ref 150–450)
POTASSIUM SERPL-SCNC: 5.5 MMOL/L (ref 3.5–5.2)
PROT SERPL-MCNC: 6.6 G/DL (ref 6–8.5)
RBC # BLD AUTO: 4.84 X10E6/UL (ref 3.77–5.28)
SODIUM SERPL-SCNC: 139 MMOL/L (ref 134–144)
TRIGL SERPL-MCNC: 82 MG/DL (ref 0–149)
TSH SERPL DL<=0.005 MIU/L-ACNC: 4.25 UIU/ML (ref 0.45–4.5)
VIT B12 SERPL-MCNC: 627 PG/ML (ref 232–1245)
VLDLC SERPL CALC-MCNC: 14 MG/DL (ref 5–40)
WBC # BLD AUTO: 5.6 X10E3/UL (ref 3.4–10.8)

## 2021-11-01 RX ORDER — PITAVASTATIN CALCIUM 4.18 MG/1
4 TABLET, FILM COATED ORAL NIGHTLY
Qty: 30 TABLET | Refills: 5 | Status: SHIPPED | OUTPATIENT
Start: 2021-11-01 | End: 2022-06-03 | Stop reason: SDUPTHER

## 2021-11-11 DIAGNOSIS — M79.7 FIBROMYALGIA: ICD-10-CM

## 2021-11-12 RX ORDER — GABAPENTIN 300 MG/1
CAPSULE ORAL
Qty: 60 CAPSULE | Refills: 3 | Status: SHIPPED | OUTPATIENT
Start: 2021-11-12 | End: 2022-03-28

## 2021-11-23 ENCOUNTER — HOSPITAL ENCOUNTER (OUTPATIENT)
Dept: CARDIOLOGY | Facility: HOSPITAL | Age: 61
Discharge: HOME | End: 2021-11-23
Admitting: INTERNAL MEDICINE

## 2021-11-23 VITALS
SYSTOLIC BLOOD PRESSURE: 119 MMHG | BODY MASS INDEX: 27.66 KG/M2 | DIASTOLIC BLOOD PRESSURE: 77 MMHG | HEART RATE: 72 BPM | HEIGHT: 64 IN | WEIGHT: 162 LBS

## 2021-11-23 DIAGNOSIS — Z13.6 ENCOUNTER FOR SCREENING FOR VASCULAR DISEASE: ICD-10-CM

## 2021-11-23 LAB
BH CV ECHO MEAS - DIST AO DIAM: 1.41 CM
BH CV VAS BP LEFT ARM: NORMAL MMHG
BH CV VAS BP RIGHT ARM: NORMAL MMHG
BH CV XLRA MEAS - MID AO DIAM: 1.59 CM
BH CV XLRA MEAS - PAD LEFT ABI DP: 1.19
BH CV XLRA MEAS - PAD LEFT ABI PT: 1.22
BH CV XLRA MEAS - PAD LEFT ARM: 118 MMHG
BH CV XLRA MEAS - PAD LEFT LEG DP: 142 MMHG
BH CV XLRA MEAS - PAD LEFT LEG PT: 145 MMHG
BH CV XLRA MEAS - PAD RIGHT ABI DP: 1.2
BH CV XLRA MEAS - PAD RIGHT ABI PT: 1.24
BH CV XLRA MEAS - PAD RIGHT ARM: 119 MMHG
BH CV XLRA MEAS - PAD RIGHT LEG DP: 143 MMHG
BH CV XLRA MEAS - PAD RIGHT LEG PT: 147 MMHG
BH CV XLRA MEAS - PROX AO DIAM: 1.82 CM
BH CV XLRA MEAS LEFT ICA/CCA RATIO: 1.41
BH CV XLRA MEAS LEFT MID CCA PSV: NORMAL CM/SEC
BH CV XLRA MEAS LEFT MID ICA PSV: NORMAL CM/SEC
BH CV XLRA MEAS LEFT PROX ECA PSV: NORMAL CM/SEC
BH CV XLRA MEAS RIGHT ICA/CCA RATIO: 1.34
BH CV XLRA MEAS RIGHT MID CCA PSV: NORMAL CM/SEC
BH CV XLRA MEAS RIGHT MID ICA PSV: NORMAL CM/SEC
BH CV XLRA MEAS RIGHT PROX ECA PSV: NORMAL CM/SEC

## 2021-11-23 PROCEDURE — 93799 UNLISTED CV SVC/PROCEDURE: CPT

## 2021-12-15 ENCOUNTER — OFFICE VISIT (OUTPATIENT)
Dept: FAMILY MEDICINE CLINIC | Facility: CLINIC | Age: 61
End: 2021-12-15

## 2021-12-15 VITALS
DIASTOLIC BLOOD PRESSURE: 78 MMHG | SYSTOLIC BLOOD PRESSURE: 138 MMHG | TEMPERATURE: 97.1 F | HEART RATE: 77 BPM | BODY MASS INDEX: 28.17 KG/M2 | WEIGHT: 165 LBS | OXYGEN SATURATION: 99 % | HEIGHT: 64 IN

## 2021-12-15 DIAGNOSIS — J01.10 ACUTE NON-RECURRENT FRONTAL SINUSITIS: ICD-10-CM

## 2021-12-15 DIAGNOSIS — H61.21 HEARING LOSS OF RIGHT EAR DUE TO CERUMEN IMPACTION: ICD-10-CM

## 2021-12-15 DIAGNOSIS — M79.7 FIBROMYALGIA: Primary | ICD-10-CM

## 2021-12-15 PROCEDURE — 99213 OFFICE O/P EST LOW 20 MIN: CPT | Performed by: INTERNAL MEDICINE

## 2021-12-15 RX ORDER — METHYLPREDNISOLONE 4 MG/1
TABLET ORAL
Qty: 1 EACH | Refills: 0 | Status: SHIPPED | OUTPATIENT
Start: 2021-12-15 | End: 2022-01-31

## 2021-12-15 RX ORDER — AZITHROMYCIN 250 MG/1
TABLET, FILM COATED ORAL
Qty: 6 TABLET | Refills: 0 | Status: SHIPPED | OUTPATIENT
Start: 2021-12-15 | End: 2022-01-25

## 2021-12-15 NOTE — PROGRESS NOTES
Subjective   Esther Duke is a 61 y.o. female.     Chief Complaint   Patient presents with   • Sinusitis   Ear problems    History of Present Illness   Patient complains of sinus congestion, yellow drainage in the morning, feels like ear draining, full fluid in the ear.  Trouble hearing from the right side.  No fever, cough, chills.  Complains of her fibromyalgia flaring.  Asking for steroids.  The following portions of the patient's history were reviewed and updated as appropriate: allergies, current medications, past family history, past medical history, past social history, past surgical history and problem list.    Review of Systems   Constitutional: Negative for activity change, appetite change, fatigue and fever.   HENT: Positive for congestion, ear discharge and sinus pressure. Negative for ear pain, facial swelling and sore throat.    Eyes: Negative for blurred vision and double vision.   Respiratory: Negative.  Negative for shortness of breath.    Cardiovascular: Negative for chest pain, palpitations and leg swelling.   Gastrointestinal: Negative.    Musculoskeletal: Positive for myalgias.   Neurological: Negative for dizziness, syncope, light-headedness and headache.       Allergies   Allergen Reactions   • Atorvastatin Myalgia   • Codeine GI Intolerance   • Crestor [Rosuvastatin Calcium] Myalgia   • Simvastatin Myalgia   • Sulfa Antibiotics Hives       Current Outpatient Medications on File Prior to Visit   Medication Sig Dispense Refill   • DULoxetine (CYMBALTA) 30 MG capsule Take 1 capsule by mouth Daily. Takes total 90 mg 30 capsule 5   • DULoxetine (CYMBALTA) 60 MG capsule Take 1 capsule by mouth Daily. 30 capsule 5   • famotidine (PEPCID) 40 MG tablet Take 1 tablet by mouth 2 (Two) Times a Day. With lunch and at bedtime 180 tablet 3   • fluticasone (FLONASE) 50 MCG/ACT nasal spray 2 sprays into the nostril(s) as directed by provider Daily. 16 g 0   • gabapentin (NEURONTIN) 300 MG capsule TAKE ONE  CAPSULE BY MOUTH TWICE A DAY 60 capsule 3   • guaiFENesin (MUCINEX) 600 MG 12 hr tablet Take 1 tablet by mouth 2 (Two) Times a Day. 20 tablet 0   • pantoprazole (PROTONIX) 40 MG EC tablet Take 1 tablet by mouth 2 (Two) Times a Day. 180 tablet 3   • Pitavastatin Calcium (Livalo) 4 MG tablet Take 1 tablet by mouth Every Night. 30 tablet 5   • senna (SENOKOT) 8.6 MG tablet tablet Take 1 tablet by mouth Every Night.     • sucralfate (Carafate) 1 g tablet Take 1 tablet by mouth every night at bedtime. If cant swallow, may cut or Crush tablet, 30 min after other meds, before bedtime. 90 tablet 3   • Xiidra 5 % ophthalmic solution      • [DISCONTINUED] amoxicillin (AMOXIL) 875 MG tablet Take 1 tablet by mouth 2 (Two) Times a Day. 20 tablet 0     No current facility-administered medications on file prior to visit.       Family History   Problem Relation Age of Onset   • Hypertension Mother    • Hyperlipidemia Mother    • Hypertension Father    • Hyperlipidemia Father    • Stroke Father    • Cancer Sister    • Aneurysm Maternal Uncle    • Heart disease Maternal Grandmother    • Colon cancer Neg Hx    • Colon polyps Neg Hx        Past Medical History:   Diagnosis Date   • Fibromyalgia    • Gastroesophageal reflux disease without esophagitis    • Hernia, hiatal    • High cholesterol    • IBS (irritable bowel syndrome)    • Other hyperlipidemia    • Reactive depression        Past Surgical History:   Procedure Laterality Date   • COLONOSCOPY         Social History     Socioeconomic History   • Marital status:    Tobacco Use   • Smoking status: Never Smoker   • Smokeless tobacco: Never Used   • Tobacco comment: caffeine use   Vaping Use   • Vaping Use: Never used   Substance and Sexual Activity   • Alcohol use: No   • Drug use: No   • Sexual activity: Defer       Patient Active Problem List   Diagnosis   • Fibromyalgia   • Other hyperlipidemia   • Reactive depression   • Chronic foot pain, right   • Irritable bowel  "syndrome with constipation   • Gastroesophageal reflux disease with esophagitis without hemorrhage       /78 (BP Location: Left arm, Patient Position: Sitting, Cuff Size: Adult)   Pulse 77   Temp 97.1 °F (36.2 °C) (Temporal)   Ht 161.3 cm (63.5\")   Wt 74.8 kg (165 lb)   SpO2 99%   BMI 28.77 kg/m²   Body mass index is 28.77 kg/m².    Objective   Physical Exam  Vitals and nursing note reviewed.   Constitutional:       Appearance: She is well-developed.   HENT:      Right Ear: Tympanic membrane, ear canal and external ear normal. There is no impacted cerumen.      Left Ear: External ear normal. There is impacted cerumen.      Ears:      Comments: Post right ear lavage TM is intact, no redness.  Patient already feeling better.  Able to hear     Nose: Rhinorrhea present. No congestion.      Mouth/Throat:      Mouth: Mucous membranes are moist.      Pharynx: Oropharynx is clear. No oropharyngeal exudate or posterior oropharyngeal erythema.   Neck:      Thyroid: No thyromegaly.      Vascular: No JVD.      Trachea: No tracheal deviation.   Cardiovascular:      Rate and Rhythm: Normal rate and regular rhythm.      Heart sounds: No murmur heard.      Pulmonary:      Effort: Pulmonary effort is normal. No respiratory distress.      Breath sounds: Normal breath sounds. No wheezing.   Abdominal:      General: Bowel sounds are normal.      Palpations: Abdomen is soft.   Musculoskeletal:      Cervical back: Normal range of motion and neck supple.   Lymphadenopathy:      Cervical: No cervical adenopathy.   Neurological:      Mental Status: She is alert and oriented to person, place, and time.           Assessment/Plan   Diagnoses and all orders for this visit:    1. Fibromyalgia (Primary)    2. Acute non-recurrent frontal sinusitis    3. Hearing loss of right ear due to cerumen impaction    Other orders  -     azithromycin (ZITHROMAX) 250 MG tablet; Take 2 tablets the first day, then 1 tablet daily for 4 days.  " Dispense: 6 tablet; Refill: 0  -     methylPREDNISolone (MEDROL) 4 MG dose pack; Take as directed on package instructions.  Dispense: 1 each; Refill: 0    Discussed with patient Debrox eardrops over-the-counter.  Take a Medrol Dosepak for fibromyalgia flareup.  If sinus symptoms continue does not get better or turns green start Z-Marcel.  Do not start Z-Marcel now.  Keep regular follow-up.  Rest of her problems are chronic and stable.  EHR dragon/transcription disclaimer:  Part of this note are created by electronic transcription/translation of spoken language to printed text and thus may lead to erroneous, or at times, nonsensical words or phrases inadvertently transcribed.  Although I have reviewed for such errors, some may still exist.

## 2022-01-11 DIAGNOSIS — R14.0 BLOATING: ICD-10-CM

## 2022-01-11 DIAGNOSIS — K21.00 GASTROESOPHAGEAL REFLUX DISEASE WITH ESOPHAGITIS WITHOUT HEMORRHAGE: ICD-10-CM

## 2022-01-11 RX ORDER — PANTOPRAZOLE SODIUM 40 MG/1
TABLET, DELAYED RELEASE ORAL
Qty: 180 TABLET | Refills: 3 | Status: SHIPPED | OUTPATIENT
Start: 2022-01-11 | End: 2023-02-15 | Stop reason: SDUPTHER

## 2022-01-25 ENCOUNTER — OFFICE VISIT (OUTPATIENT)
Dept: FAMILY MEDICINE CLINIC | Facility: CLINIC | Age: 62
End: 2022-01-25

## 2022-01-25 VITALS
OXYGEN SATURATION: 99 % | TEMPERATURE: 97.1 F | SYSTOLIC BLOOD PRESSURE: 120 MMHG | HEART RATE: 86 BPM | HEIGHT: 64 IN | DIASTOLIC BLOOD PRESSURE: 80 MMHG | BODY MASS INDEX: 27.83 KG/M2 | WEIGHT: 163 LBS

## 2022-01-25 DIAGNOSIS — M79.7 FIBROMYALGIA: ICD-10-CM

## 2022-01-25 DIAGNOSIS — E78.49 OTHER HYPERLIPIDEMIA: Primary | ICD-10-CM

## 2022-01-25 DIAGNOSIS — K21.00 GASTROESOPHAGEAL REFLUX DISEASE WITH ESOPHAGITIS WITHOUT HEMORRHAGE: ICD-10-CM

## 2022-01-25 DIAGNOSIS — K58.1 IRRITABLE BOWEL SYNDROME WITH CONSTIPATION: ICD-10-CM

## 2022-01-25 DIAGNOSIS — F32.9 REACTIVE DEPRESSION: ICD-10-CM

## 2022-01-25 DIAGNOSIS — R10.13 EPIGASTRIC PAIN: ICD-10-CM

## 2022-01-25 PROCEDURE — 99214 OFFICE O/P EST MOD 30 MIN: CPT | Performed by: INTERNAL MEDICINE

## 2022-01-25 NOTE — PROGRESS NOTES
Subjective   Esther Duke is a 61 y.o. female.     Chief Complaint   Patient presents with   • Hyperlipidemia   • Fibromyalgia   Depression, GERD    History of Present Illness   She is here for follow-up for hyperlipidemia, fibromyalgia, depression, GERD.  Went to the list of medication.  Denies any chest pain shortness of breath.  Complains of abdominal pain last night and flared up, no nausea, vomiting, fever, diarrhea.  Bowel movements are regular.  She already taking Protonix 40 daily, Pepcid 40 twice a day.  Had EGD done last year.  She sees gastroenterologist her appointment was postponed.  The following portions of the patient's history were reviewed and updated as appropriate: allergies, current medications, past family history, past medical history, past social history, past surgical history and problem list.    Review of Systems   Constitutional: Negative for activity change, appetite change, fatigue and fever.   Eyes: Negative for blurred vision and double vision.   Respiratory: Negative for shortness of breath.    Cardiovascular: Negative for chest pain, palpitations and leg swelling.   Gastrointestinal: Positive for abdominal pain. Negative for abdominal distention, anal bleeding, blood in stool, constipation, diarrhea, nausea and vomiting.   Genitourinary: Negative for dysuria, flank pain, frequency and hematuria.   Neurological: Negative for dizziness, syncope, light-headedness and headache.   Psychiatric/Behavioral: Negative for agitation, behavioral problems and decreased concentration.       Allergies   Allergen Reactions   • Atorvastatin Myalgia   • Codeine GI Intolerance   • Crestor [Rosuvastatin Calcium] Myalgia   • Simvastatin Myalgia   • Sulfa Antibiotics Hives       Current Outpatient Medications on File Prior to Visit   Medication Sig Dispense Refill   • DULoxetine (CYMBALTA) 30 MG capsule Take 1 capsule by mouth Daily. Takes total 90 mg 30 capsule 5   • DULoxetine (CYMBALTA) 60 MG  capsule Take 1 capsule by mouth Daily. 30 capsule 5   • famotidine (PEPCID) 40 MG tablet Take 1 tablet by mouth 2 (Two) Times a Day. With lunch and at bedtime 180 tablet 3   • fluticasone (FLONASE) 50 MCG/ACT nasal spray 2 sprays into the nostril(s) as directed by provider Daily. 16 g 0   • gabapentin (NEURONTIN) 300 MG capsule TAKE ONE CAPSULE BY MOUTH TWICE A DAY 60 capsule 3   • pantoprazole (PROTONIX) 40 MG EC tablet TAKE ONE TABLET BY MOUTH TWICE A  tablet 3   • Pitavastatin Calcium (Livalo) 4 MG tablet Take 1 tablet by mouth Every Night. 30 tablet 5   • senna (SENOKOT) 8.6 MG tablet tablet Take 1 tablet by mouth Every Night.     • sucralfate (Carafate) 1 g tablet Take 1 tablet by mouth every night at bedtime. If cant swallow, may cut or Crush tablet, 30 min after other meds, before bedtime. 90 tablet 3   • Xiidra 5 % ophthalmic solution      • guaiFENesin (MUCINEX) 600 MG 12 hr tablet Take 1 tablet by mouth 2 (Two) Times a Day. 20 tablet 0   • methylPREDNISolone (MEDROL) 4 MG dose pack Take as directed on package instructions. 1 each 0   • [DISCONTINUED] azithromycin (ZITHROMAX) 250 MG tablet Take 2 tablets the first day, then 1 tablet daily for 4 days. 6 tablet 0     No current facility-administered medications on file prior to visit.       Family History   Problem Relation Age of Onset   • Hypertension Mother    • Hyperlipidemia Mother    • Hypertension Father    • Hyperlipidemia Father    • Stroke Father    • Cancer Sister    • Aneurysm Maternal Uncle    • Heart disease Maternal Grandmother    • Colon cancer Neg Hx    • Colon polyps Neg Hx        Past Medical History:   Diagnosis Date   • Fibromyalgia    • Gastroesophageal reflux disease without esophagitis    • Hernia, hiatal    • High cholesterol    • IBS (irritable bowel syndrome)    • Other hyperlipidemia    • Reactive depression        Past Surgical History:   Procedure Laterality Date   • COLONOSCOPY         Social History     Socioeconomic  "History   • Marital status:    Tobacco Use   • Smoking status: Never Smoker   • Smokeless tobacco: Never Used   • Tobacco comment: caffeine use   Vaping Use   • Vaping Use: Never used   Substance and Sexual Activity   • Alcohol use: No   • Drug use: No   • Sexual activity: Defer       Patient Active Problem List   Diagnosis   • Fibromyalgia   • Other hyperlipidemia   • Reactive depression   • Chronic foot pain, right   • Irritable bowel syndrome with constipation   • Gastroesophageal reflux disease with esophagitis without hemorrhage       /80 (BP Location: Left arm, Patient Position: Sitting, Cuff Size: Large Adult)   Pulse 86   Temp 97.1 °F (36.2 °C)   Ht 162.6 cm (64\")   Wt 73.9 kg (163 lb)   SpO2 99%   BMI 27.98 kg/m²   Body mass index is 27.98 kg/m².    Objective   Physical Exam  Vitals and nursing note reviewed.   Constitutional:       Appearance: She is well-developed.   Eyes:      Pupils: Pupils are equal, round, and reactive to light.   Neck:      Thyroid: No thyromegaly.      Vascular: No JVD.      Trachea: No tracheal deviation.   Cardiovascular:      Rate and Rhythm: Normal rate and regular rhythm.      Pulses: Normal pulses.      Heart sounds: No murmur heard.      Pulmonary:      Effort: Pulmonary effort is normal. No respiratory distress.      Breath sounds: Normal breath sounds. No wheezing.   Abdominal:      General: Abdomen is flat. Bowel sounds are normal. There is no distension.      Palpations: Abdomen is soft. There is no mass.      Tenderness: There is no abdominal tenderness. There is no right CVA tenderness, left CVA tenderness, guarding or rebound.      Hernia: No hernia is present.   Musculoskeletal:      Cervical back: Normal range of motion and neck supple.   Lymphadenopathy:      Cervical: No cervical adenopathy.   Neurological:      General: No focal deficit present.      Mental Status: She is alert and oriented to person, place, and time. Mental status is at " baseline.   Psychiatric:         Mood and Affect: Mood normal.         Behavior: Behavior normal.           Assessment/Plan   Diagnoses and all orders for this visit:    1. Other hyperlipidemia (Primary)    2. Irritable bowel syndrome with constipation  -     Comprehensive Metabolic Panel  -     CBC & Differential  -     CK  -     Lipid Panel With / Chol / HDL Ratio  -     TSH  -     Amylase  -     Lipase    3. Reactive depression    4. Fibromyalgia    5. Gastroesophageal reflux disease with esophagitis without hemorrhage  -     Comprehensive Metabolic Panel  -     CBC & Differential  -     CK  -     Lipid Panel With / Chol / HDL Ratio  -     TSH  -     Amylase  -     Lipase    6. Epigastric pain    Continue Protonix, Pepcid.  Call the GI and make an appointment for follow-up her appointment was canceled.  Awaiting labs.  If anything abnormal will proceed with CT of the abdomen pelvis.  She had a CAT scan done 3 years ago.  Does not have gallbladder.  Patient to check her temperature, going bland diet, if worse come back or ER.  Continue taking Pepcid, Protonix, Neurontin, Carafate, Livalo.  All problems are chronic stable except for flareup of abdominal pain.  Return in 3 months time before if no better.  EHR dragon/transcription disclaimer:  Part of this note are created by electronic transcription/translation of spoken language to printed text and thus may lead to erroneous, or at times, nonsensical words or phrases inadvertently transcribed.  Although I have reviewed for such errors, some may still exist.

## 2022-01-26 ENCOUNTER — TELEPHONE (OUTPATIENT)
Dept: FAMILY MEDICINE CLINIC | Facility: CLINIC | Age: 62
End: 2022-01-26

## 2022-01-26 LAB
ALBUMIN SERPL-MCNC: 4.4 G/DL (ref 3.8–4.9)
ALBUMIN/GLOB SERPL: 1.9 {RATIO} (ref 1.2–2.2)
ALP SERPL-CCNC: 86 IU/L (ref 44–121)
ALT SERPL-CCNC: 16 IU/L (ref 0–32)
AMYLASE SERPL-CCNC: 36 U/L (ref 31–110)
AST SERPL-CCNC: 17 IU/L (ref 0–40)
BASOPHILS # BLD AUTO: 0.1 X10E3/UL (ref 0–0.2)
BASOPHILS NFR BLD AUTO: 1 %
BILIRUB SERPL-MCNC: <0.2 MG/DL (ref 0–1.2)
BUN SERPL-MCNC: 8 MG/DL (ref 8–27)
BUN/CREAT SERPL: 11 (ref 12–28)
CALCIUM SERPL-MCNC: 9.2 MG/DL (ref 8.7–10.3)
CHLORIDE SERPL-SCNC: 103 MMOL/L (ref 96–106)
CHOLEST SERPL-MCNC: 215 MG/DL (ref 100–199)
CHOLEST/HDLC SERPL: 3 RATIO (ref 0–4.4)
CK SERPL-CCNC: 48 U/L (ref 32–182)
CO2 SERPL-SCNC: 26 MMOL/L (ref 20–29)
CREAT SERPL-MCNC: 0.7 MG/DL (ref 0.57–1)
EOSINOPHIL # BLD AUTO: 0.3 X10E3/UL (ref 0–0.4)
EOSINOPHIL NFR BLD AUTO: 7 %
ERYTHROCYTE [DISTWIDTH] IN BLOOD BY AUTOMATED COUNT: 13.6 % (ref 11.7–15.4)
GLOBULIN SER CALC-MCNC: 2.3 G/DL (ref 1.5–4.5)
GLUCOSE SERPL-MCNC: 99 MG/DL (ref 65–99)
HCT VFR BLD AUTO: 42.7 % (ref 34–46.6)
HDLC SERPL-MCNC: 72 MG/DL
HGB BLD-MCNC: 13.9 G/DL (ref 11.1–15.9)
IMM GRANULOCYTES # BLD AUTO: 0 X10E3/UL (ref 0–0.1)
IMM GRANULOCYTES NFR BLD AUTO: 0 %
LDLC SERPL CALC-MCNC: 127 MG/DL (ref 0–99)
LIPASE SERPL-CCNC: 20 U/L (ref 14–72)
LYMPHOCYTES # BLD AUTO: 1.4 X10E3/UL (ref 0.7–3.1)
LYMPHOCYTES NFR BLD AUTO: 34 %
MCH RBC QN AUTO: 27.9 PG (ref 26.6–33)
MCHC RBC AUTO-ENTMCNC: 32.6 G/DL (ref 31.5–35.7)
MCV RBC AUTO: 86 FL (ref 79–97)
MONOCYTES # BLD AUTO: 0.5 X10E3/UL (ref 0.1–0.9)
MONOCYTES NFR BLD AUTO: 11 %
NEUTROPHILS # BLD AUTO: 1.9 X10E3/UL (ref 1.4–7)
NEUTROPHILS NFR BLD AUTO: 47 %
PLATELET # BLD AUTO: 342 X10E3/UL (ref 150–450)
POTASSIUM SERPL-SCNC: 4.3 MMOL/L (ref 3.5–5.2)
PROT SERPL-MCNC: 6.7 G/DL (ref 6–8.5)
RBC # BLD AUTO: 4.99 X10E6/UL (ref 3.77–5.28)
SODIUM SERPL-SCNC: 140 MMOL/L (ref 134–144)
TRIGL SERPL-MCNC: 93 MG/DL (ref 0–149)
TSH SERPL-ACNC: 4.22 UIU/ML (ref 0.45–4.5)
VLDLC SERPL CALC-MCNC: 16 MG/DL (ref 5–40)
WBC # BLD AUTO: 4 X10E3/UL (ref 3.4–10.8)

## 2022-01-31 ENCOUNTER — OFFICE VISIT (OUTPATIENT)
Dept: GASTROENTEROLOGY | Facility: CLINIC | Age: 62
End: 2022-01-31

## 2022-01-31 VITALS
HEIGHT: 64 IN | SYSTOLIC BLOOD PRESSURE: 138 MMHG | BODY MASS INDEX: 28.41 KG/M2 | DIASTOLIC BLOOD PRESSURE: 70 MMHG | WEIGHT: 166.4 LBS

## 2022-01-31 DIAGNOSIS — K21.00 GASTROESOPHAGEAL REFLUX DISEASE WITH ESOPHAGITIS WITHOUT HEMORRHAGE: Primary | ICD-10-CM

## 2022-01-31 DIAGNOSIS — K59.04 CHRONIC IDIOPATHIC CONSTIPATION: ICD-10-CM

## 2022-01-31 PROCEDURE — 99213 OFFICE O/P EST LOW 20 MIN: CPT | Performed by: INTERNAL MEDICINE

## 2022-01-31 RX ORDER — LUBIPROSTONE 8 UG/1
8 CAPSULE ORAL 2 TIMES DAILY WITH MEALS
Qty: 60 CAPSULE | Refills: 11 | Status: SHIPPED | OUTPATIENT
Start: 2022-01-31 | End: 2023-02-17

## 2022-01-31 NOTE — PROGRESS NOTES
"    PATIENT INFORMATION  Esther Duke       - 1960    CHIEF COMPLAINT  Chief Complaint   Patient presents with   • Heartburn   • Irritable Bowel Syndrome       HISTORY OF PRESENT ILLNESS  Her for GERd and Cnstipation and is onBID PPI but taking at night so will rearrange that and continue Daily Pepcid and PRN Carafate    Reviewed lifestyle chnges and isdoing wellwith her     Fiber ( fruits and fiber cereal) , hydration and Senna  /S so will rearrange and add Miralax and Amitiza to get her off the Senna      REVIEWED PERTINENT RESULTS/ LABS  Lab Results   Component Value Date    CASEREPORT  2019     Surgical Pathology Report                         Case: [id number]                                  Authorizing Provider:  Deanna Lorenzo MD         Collected:           2019 09:43 AM          Pathologist:           Marge Walker MD  Received:            05/15/2019 05:42 AM          Specimens:   1) - Gastric, epigastric                                                                            2) - Small Intestine, small bowel                                                                   3) - Gastric, gastric polyp                                                                         4) - Esophagus, Distal                                                                     FINALDX  2019     1.  Stomach, Biopsy:    A.  Chemical gastropathy.   B.  Negative for Helicobacter by routine staining.   C.  No metaplasia, dysplasia or malignancy identified.     2.  Small Bowel, Biopsy:  Benign small bowel mucosa with   A. Normal intact villous surface.   B. No significant inflammation, no granulomas.   C. No viral inclusions or other organisms on routinely stained sections.     3.  \"Gastric Polyp\", Biopsy:    A.  Fragment of gastric mucosa with chemical gastropathy.   B.  Benign fundic gland polyp.    C.  No metaplasia, dysplasia or malignancy identified.     4.  Distal Esophagus, " Biopsy:    A.  Benign gastroesophageal junction mucosa with reflux esophagitis.   B.  Negative for goblet cell metaplasia and dysplasia.      swm/brb        Lab Results   Component Value Date    HGB 13.9 01/25/2022    MCV 86 01/25/2022     01/25/2022    ALT 16 01/25/2022    AST 17 01/25/2022    TRIG 93 01/25/2022      No results found.    REVIEW OF SYSTEMS  Review of Systems   Constitutional: Negative for activity change, chills, fever and unexpected weight change.   HENT: Negative for congestion.    Eyes: Negative for visual disturbance.   Respiratory: Negative for shortness of breath.    Cardiovascular: Negative for chest pain and palpitations.   Gastrointestinal: Positive for abdominal distention, abdominal pain, constipation and nausea. Negative for blood in stool.   Endocrine: Negative for cold intolerance and heat intolerance.   Genitourinary: Negative for hematuria.   Musculoskeletal: Negative for gait problem.   Skin: Negative for color change.   Allergic/Immunologic: Negative for immunocompromised state.   Neurological: Negative for weakness and light-headedness.   Hematological: Negative for adenopathy.   Psychiatric/Behavioral: Negative for sleep disturbance. The patient is not nervous/anxious.          ACTIVE PROBLEMS  Patient Active Problem List    Diagnosis    • Irritable bowel syndrome with constipation [K58.1]    • Gastroesophageal reflux disease with esophagitis without hemorrhage [K21.00]    • Chronic foot pain, right [M79.671, G89.29]    • Fibromyalgia [M79.7]    • Other hyperlipidemia [E78.49]    • Reactive depression [F32.9]          PAST MEDICAL HISTORY  Past Medical History:   Diagnosis Date   • Fibromyalgia    • Gastroesophageal reflux disease without esophagitis    • Hernia, hiatal    • High cholesterol    • IBS (irritable bowel syndrome)    • Other hyperlipidemia    • Reactive depression          SURGICAL HISTORY  Past Surgical History:   Procedure Laterality Date   • COLONOSCOPY            FAMILY HISTORY  Family History   Problem Relation Age of Onset   • Hypertension Mother    • Hyperlipidemia Mother    • Hypertension Father    • Hyperlipidemia Father    • Stroke Father    • Cancer Sister    • Aneurysm Maternal Uncle    • Heart disease Maternal Grandmother    • Colon cancer Neg Hx    • Colon polyps Neg Hx          SOCIAL HISTORY  Social History     Occupational History   • Not on file   Tobacco Use   • Smoking status: Never Smoker   • Smokeless tobacco: Never Used   • Tobacco comment: caffeine use   Vaping Use   • Vaping Use: Never used   Substance and Sexual Activity   • Alcohol use: No   • Drug use: No   • Sexual activity: Defer         CURRENT MEDICATIONS    Current Outpatient Medications:   •  DULoxetine (CYMBALTA) 30 MG capsule, Take 1 capsule by mouth Daily. Takes total 90 mg, Disp: 30 capsule, Rfl: 5  •  DULoxetine (CYMBALTA) 60 MG capsule, Take 1 capsule by mouth Daily., Disp: 30 capsule, Rfl: 5  •  famotidine (PEPCID) 40 MG tablet, Take 1 tablet by mouth 2 (Two) Times a Day. With lunch and at bedtime, Disp: 180 tablet, Rfl: 3  •  fluticasone (FLONASE) 50 MCG/ACT nasal spray, 2 sprays into the nostril(s) as directed by provider Daily., Disp: 16 g, Rfl: 0  •  gabapentin (NEURONTIN) 300 MG capsule, TAKE ONE CAPSULE BY MOUTH TWICE A DAY, Disp: 60 capsule, Rfl: 3  •  pantoprazole (PROTONIX) 40 MG EC tablet, TAKE ONE TABLET BY MOUTH TWICE A DAY, Disp: 180 tablet, Rfl: 3  •  Pitavastatin Calcium (Livalo) 4 MG tablet, Take 1 tablet by mouth Every Night., Disp: 30 tablet, Rfl: 5  •  senna (SENOKOT) 8.6 MG tablet tablet, Take 1 tablet by mouth Every Night., Disp: , Rfl:   •  sucralfate (Carafate) 1 g tablet, Take 1 tablet by mouth every night at bedtime. If cant swallow, may cut or Crush tablet, 30 min after other meds, before bedtime., Disp: 90 tablet, Rfl: 3  •  Xiidra 5 % ophthalmic solution, , Disp: , Rfl:   •  lubiprostone (Amitiza) 8 MCG capsule, Take 1 capsule by mouth 2 (Two) Times a  "Day With Meals., Disp: 60 capsule, Rfl: 11    ALLERGIES  Atorvastatin, Codeine, Crestor [rosuvastatin calcium], Simvastatin, and Sulfa antibiotics    VITALS  Vitals:    01/31/22 1511   BP: 138/70   BP Location: Left arm   Patient Position: Sitting   Cuff Size: Large Adult   Weight: 75.5 kg (166 lb 6.4 oz)   Height: 162.6 cm (64\")       PHYSICAL EXAM  Debilities/Disabilities Identified: None  Emotional Behavior: Appropriate  Wt Readings from Last 3 Encounters:   01/31/22 75.5 kg (166 lb 6.4 oz)   01/25/22 73.9 kg (163 lb)   12/15/21 74.8 kg (165 lb)     Ht Readings from Last 1 Encounters:   01/31/22 162.6 cm (64\")     Body mass index is 28.56 kg/m².  Physical Exam  Constitutional:       Appearance: She is well-developed. She is not diaphoretic.   HENT:      Head: Normocephalic and atraumatic.   Eyes:      General: No scleral icterus.     Conjunctiva/sclera: Conjunctivae normal.      Pupils: Pupils are equal, round, and reactive to light.   Neck:      Thyroid: No thyromegaly.   Cardiovascular:      Rate and Rhythm: Normal rate and regular rhythm.      Heart sounds: Normal heart sounds. No murmur heard.  No gallop.    Pulmonary:      Effort: Pulmonary effort is normal.      Breath sounds: Normal breath sounds. No wheezing or rales.   Abdominal:      General: Bowel sounds are normal. There is no distension or abdominal bruit.      Palpations: Abdomen is soft. There is no shifting dullness, fluid wave or mass.      Tenderness: There is abdominal tenderness. There is no guarding. Negative signs include Franklin's sign.      Hernia: There is no hernia in the ventral area.   Musculoskeletal:         General: Normal range of motion.      Cervical back: Normal range of motion and neck supple.   Lymphadenopathy:      Cervical: No cervical adenopathy.   Skin:     General: Skin is warm and dry.      Findings: No erythema or rash.   Neurological:      Mental Status: She is alert and oriented to person, place, and time. "   Psychiatric:         Mood and Affect: Mood normal.         Behavior: Behavior normal.         CLINICAL DATA REVIEWED   reviewed previous lab results and integrated with today's visit, reviewed notes from other physicians and/or last GI encounter, reviewed previous endoscopy results and available photos, reviewed surgical pathology results from previous biopsies    ASSESSMENT  Diagnoses and all orders for this visit:    Gastroesophageal reflux disease with esophagitis without hemorrhage    Chronic idiopathic constipation    Other orders  -     lubiprostone (Amitiza) 8 MCG capsule; Take 1 capsule by mouth 2 (Two) Times a Day With Meals.          PLAN  Return in about 4 months (around 5/31/2022).    I have discussed the above plan with the patient.  They verbalize understanding and are in agreement with the plan.  They have been advised to contact the office for any questions, concerns, or changes related to their health.

## 2022-02-14 DIAGNOSIS — R10.13 DYSPEPSIA: ICD-10-CM

## 2022-02-14 DIAGNOSIS — K21.00 GASTROESOPHAGEAL REFLUX DISEASE WITH ESOPHAGITIS WITHOUT HEMORRHAGE: ICD-10-CM

## 2022-02-14 DIAGNOSIS — K58.1 IRRITABLE BOWEL SYNDROME WITH CONSTIPATION: ICD-10-CM

## 2022-02-14 RX ORDER — FAMOTIDINE 40 MG/1
TABLET, FILM COATED ORAL
Qty: 180 TABLET | Refills: 3 | Status: SHIPPED | OUTPATIENT
Start: 2022-02-14 | End: 2023-02-16 | Stop reason: SDUPTHER

## 2022-02-14 RX ORDER — DULOXETIN HYDROCHLORIDE 30 MG/1
CAPSULE, DELAYED RELEASE ORAL
Qty: 30 CAPSULE | Refills: 5 | Status: SHIPPED | OUTPATIENT
Start: 2022-02-14 | End: 2022-09-01 | Stop reason: SDUPTHER

## 2022-03-26 DIAGNOSIS — M79.7 FIBROMYALGIA: ICD-10-CM

## 2022-03-30 RX ORDER — GABAPENTIN 300 MG/1
CAPSULE ORAL
Qty: 60 CAPSULE | Refills: 2 | Status: SHIPPED | OUTPATIENT
Start: 2022-03-30 | End: 2022-07-06 | Stop reason: SDUPTHER

## 2022-04-19 ENCOUNTER — OFFICE VISIT (OUTPATIENT)
Dept: FAMILY MEDICINE CLINIC | Facility: CLINIC | Age: 62
End: 2022-04-19

## 2022-04-19 VITALS
HEART RATE: 77 BPM | BODY MASS INDEX: 28 KG/M2 | WEIGHT: 164 LBS | OXYGEN SATURATION: 98 % | RESPIRATION RATE: 12 BRPM | HEIGHT: 64 IN | SYSTOLIC BLOOD PRESSURE: 118 MMHG | DIASTOLIC BLOOD PRESSURE: 82 MMHG | TEMPERATURE: 97.7 F

## 2022-04-19 DIAGNOSIS — E78.49 OTHER HYPERLIPIDEMIA: Primary | ICD-10-CM

## 2022-04-19 DIAGNOSIS — M79.7 FIBROMYALGIA: ICD-10-CM

## 2022-04-19 DIAGNOSIS — K58.1 IRRITABLE BOWEL SYNDROME WITH CONSTIPATION: ICD-10-CM

## 2022-04-19 DIAGNOSIS — K21.00 GASTROESOPHAGEAL REFLUX DISEASE WITH ESOPHAGITIS WITHOUT HEMORRHAGE: ICD-10-CM

## 2022-04-19 DIAGNOSIS — L98.9 SKIN LESION: ICD-10-CM

## 2022-04-19 DIAGNOSIS — F32.9 REACTIVE DEPRESSION: ICD-10-CM

## 2022-04-19 PROCEDURE — 99214 OFFICE O/P EST MOD 30 MIN: CPT | Performed by: INTERNAL MEDICINE

## 2022-04-19 NOTE — PROGRESS NOTES
Subjective   Esther Duke is a 61 y.o. female.     Chief Complaint   Patient presents with   • Fibromyalgia   • Hyperlipidemia   Depression, GERD,    History of Present Illness   Here follow-up on fibromyalgia, hyperlipidemia, depression, GERD.  Went to the list of medications taking.  Still on Neurontin.  Depression is under control.  Taking Livalo.  Cannot tolerate any other statins.  Fibromyalgia under control.  Also complains of left hand with mole that is itching for a few months.  The following portions of the patient's history were reviewed and updated as appropriate: allergies, current medications, past family history, past medical history, past social history, past surgical history and problem list.    Review of Systems   Constitutional: Negative for activity change, appetite change, fatigue and fever.   Eyes: Negative for blurred vision and double vision.   Respiratory: Negative.  Negative for shortness of breath.    Cardiovascular: Negative for chest pain, palpitations and leg swelling.   Gastrointestinal: Negative.    Genitourinary: Negative for hematuria.   Neurological: Negative for dizziness, syncope, light-headedness and headache.   Psychiatric/Behavioral: Negative for agitation, behavioral problems and decreased concentration.       Allergies   Allergen Reactions   • Atorvastatin Myalgia   • Codeine GI Intolerance   • Crestor [Rosuvastatin Calcium] Myalgia   • Simvastatin Myalgia   • Sulfa Antibiotics Hives       Current Outpatient Medications on File Prior to Visit   Medication Sig Dispense Refill   • DULoxetine (CYMBALTA) 30 MG capsule TAKE ONE CAPSULE BY MOUTH DAILY (TAKES TOTAL OF 90MG) 30 capsule 5   • DULoxetine (CYMBALTA) 60 MG capsule Take 1 capsule by mouth Daily. 30 capsule 5   • fluticasone (FLONASE) 50 MCG/ACT nasal spray 2 sprays into the nostril(s) as directed by provider Daily. 16 g 0   • gabapentin (NEURONTIN) 300 MG capsule TAKE ONE CAPSULE BY MOUTH TWICE A DAY 60 capsule 2   •  lubiprostone (Amitiza) 8 MCG capsule Take 1 capsule by mouth 2 (Two) Times a Day With Meals. 60 capsule 11   • pantoprazole (PROTONIX) 40 MG EC tablet TAKE ONE TABLET BY MOUTH TWICE A  tablet 3   • Pitavastatin Calcium (Livalo) 4 MG tablet Take 1 tablet by mouth Every Night. 30 tablet 5   • sucralfate (Carafate) 1 g tablet Take 1 tablet by mouth every night at bedtime. If cant swallow, may cut or Crush tablet, 30 min after other meds, before bedtime. 90 tablet 3   • Xiidra 5 % ophthalmic solution      • famotidine (PEPCID) 40 MG tablet TAKE ONE TABLET BY MOUTH TWICE A DAY WITH LUNCH AND AT BEDTIME 180 tablet 3     No current facility-administered medications on file prior to visit.       Family History   Problem Relation Age of Onset   • Hypertension Mother    • Hyperlipidemia Mother    • Hypertension Father    • Hyperlipidemia Father    • Stroke Father    • Cancer Sister    • Aneurysm Maternal Uncle    • Heart disease Maternal Grandmother    • Colon cancer Neg Hx    • Colon polyps Neg Hx        Past Medical History:   Diagnosis Date   • Fibromyalgia    • Gastroesophageal reflux disease without esophagitis    • Hernia, hiatal    • High cholesterol    • IBS (irritable bowel syndrome)    • Other hyperlipidemia    • Reactive depression        Past Surgical History:   Procedure Laterality Date   • COLONOSCOPY         Social History     Socioeconomic History   • Marital status:    Tobacco Use   • Smoking status: Never Smoker   • Smokeless tobacco: Never Used   • Tobacco comment: caffeine use   Vaping Use   • Vaping Use: Never used   Substance and Sexual Activity   • Alcohol use: No   • Drug use: No   • Sexual activity: Defer       Patient Active Problem List   Diagnosis   • Fibromyalgia   • Other hyperlipidemia   • Reactive depression   • Chronic foot pain, right   • Irritable bowel syndrome with constipation   • Gastroesophageal reflux disease with esophagitis without hemorrhage       /82 (BP  "Location: Left arm, Patient Position: Sitting, Cuff Size: Large Adult)   Pulse 77   Temp 97.7 °F (36.5 °C) (Temporal)   Resp 12   Ht 162.6 cm (64\")   Wt 74.4 kg (164 lb)   SpO2 98%   BMI 28.15 kg/m²   Body mass index is 28.15 kg/m².    Objective   Physical Exam  Vitals and nursing note reviewed.   Constitutional:       Appearance: She is well-developed.   Eyes:      Extraocular Movements: Extraocular movements intact.      Pupils: Pupils are equal, round, and reactive to light.   Neck:      Thyroid: No thyromegaly.      Vascular: No JVD.      Trachea: No tracheal deviation.   Cardiovascular:      Rate and Rhythm: Normal rate and regular rhythm.      Heart sounds: No murmur heard.  Pulmonary:      Effort: Pulmonary effort is normal. No respiratory distress.      Breath sounds: Normal breath sounds. No wheezing.   Abdominal:      General: Bowel sounds are normal. There is no distension.      Palpations: Abdomen is soft. There is no mass.      Tenderness: There is no abdominal tenderness. There is no right CVA tenderness, left CVA tenderness, guarding or rebound.      Hernia: No hernia is present.   Musculoskeletal:      Cervical back: Normal range of motion and neck supple.   Lymphadenopathy:      Cervical: No cervical adenopathy.   Neurological:      General: No focal deficit present.      Mental Status: She is alert and oriented to person, place, and time. Mental status is at baseline.      Cranial Nerves: No cranial nerve deficit.      Sensory: No sensory deficit.           Assessment/Plan   Diagnoses and all orders for this visit:    1. Other hyperlipidemia (Primary)  -     Comprehensive Metabolic Panel  -     CK  -     Lipid Panel With / Chol / HDL Ratio  -     TSH    2. Irritable bowel syndrome with constipation  -     Comprehensive Metabolic Panel  -     CK  -     Lipid Panel With / Chol / HDL Ratio  -     TSH    3. Reactive depression  -     Comprehensive Metabolic Panel  -     CK  -     Lipid Panel " With / Chol / HDL Ratio  -     TSH    4. Fibromyalgia  -     Comprehensive Metabolic Panel  -     CK  -     Lipid Panel With / Chol / HDL Ratio  -     TSH    5. Gastroesophageal reflux disease with esophagitis without hemorrhage  -     Comprehensive Metabolic Panel  -     CK  -     Lipid Panel With / Chol / HDL Ratio  -     TSH    6. Skin lesion    Protonix is helping her GERD symptoms occasionally takes Pepcid.  Patient is on Neurontin helping with her fibromyalgia, restless leg syndrome.  She also takes sucralfate just once at night.  On Cymbalta, Livalo, Amitiza.  Bowels are moving.  Continue diet and exercise.  Lose weight.  Return in 3 months time.  All her problems are chronic and stable.  Discussed with patient getting vascular screening, calcium scoring cardiac scan.  Regarding her mole on her hand I have asked her to see her dermatologist Dr. Coleman.  Patient to call make an appointment.  EHR dragon/transcription disclaimer:  Part of this note are created by electronic transcription/translation of spoken language to printed text and thus may lead to erroneous, or at times, nonsensical words or phrases inadvertently transcribed.  Although I have reviewed for such errors, some may still exist.

## 2022-04-20 LAB
ALBUMIN SERPL-MCNC: 4.3 G/DL (ref 3.8–4.9)
ALBUMIN/GLOB SERPL: 1.9 {RATIO} (ref 1.2–2.2)
ALP SERPL-CCNC: 103 IU/L (ref 44–121)
ALT SERPL-CCNC: 14 IU/L (ref 0–32)
AST SERPL-CCNC: 16 IU/L (ref 0–40)
BILIRUB SERPL-MCNC: 0.3 MG/DL (ref 0–1.2)
BUN SERPL-MCNC: 13 MG/DL (ref 8–27)
BUN/CREAT SERPL: 17 (ref 12–28)
CALCIUM SERPL-MCNC: 9.7 MG/DL (ref 8.7–10.3)
CHLORIDE SERPL-SCNC: 101 MMOL/L (ref 96–106)
CHOLEST SERPL-MCNC: 188 MG/DL (ref 100–199)
CHOLEST/HDLC SERPL: 2.9 RATIO (ref 0–4.4)
CK SERPL-CCNC: 53 U/L (ref 32–182)
CO2 SERPL-SCNC: 24 MMOL/L (ref 20–29)
CREAT SERPL-MCNC: 0.76 MG/DL (ref 0.57–1)
EGFRCR SERPLBLD CKD-EPI 2021: 90 ML/MIN/1.73
GLOBULIN SER CALC-MCNC: 2.3 G/DL (ref 1.5–4.5)
GLUCOSE SERPL-MCNC: 99 MG/DL (ref 65–99)
HDLC SERPL-MCNC: 64 MG/DL
LDLC SERPL CALC-MCNC: 109 MG/DL (ref 0–99)
POTASSIUM SERPL-SCNC: 4.5 MMOL/L (ref 3.5–5.2)
PROT SERPL-MCNC: 6.6 G/DL (ref 6–8.5)
SODIUM SERPL-SCNC: 139 MMOL/L (ref 134–144)
TRIGL SERPL-MCNC: 83 MG/DL (ref 0–149)
TSH SERPL DL<=0.005 MIU/L-ACNC: 2.57 UIU/ML (ref 0.45–4.5)
VLDLC SERPL CALC-MCNC: 15 MG/DL (ref 5–40)

## 2022-04-25 ENCOUNTER — OFFICE VISIT (OUTPATIENT)
Dept: CARDIOLOGY | Facility: CLINIC | Age: 62
End: 2022-04-25

## 2022-04-25 VITALS
OXYGEN SATURATION: 97 % | HEART RATE: 68 BPM | WEIGHT: 163 LBS | RESPIRATION RATE: 16 BRPM | BODY MASS INDEX: 27.83 KG/M2 | HEIGHT: 64 IN | SYSTOLIC BLOOD PRESSURE: 130 MMHG | DIASTOLIC BLOOD PRESSURE: 80 MMHG

## 2022-04-25 DIAGNOSIS — E78.49 OTHER HYPERLIPIDEMIA: Primary | ICD-10-CM

## 2022-04-25 PROCEDURE — 99214 OFFICE O/P EST MOD 30 MIN: CPT | Performed by: NURSE PRACTITIONER

## 2022-04-25 PROCEDURE — 93000 ELECTROCARDIOGRAM COMPLETE: CPT | Performed by: NURSE PRACTITIONER

## 2022-04-25 NOTE — PROGRESS NOTES
"  Date of Office Visit: 2022  Encounter Provider: MARIANELA Jones  Place of Service: [facility] CARDIOLOGY  Patient Name: Esther Duke  :1960  Primary Cardiologist: Dr. Chavez    CC:  Annual cardiac evaluation    Dear Dr. Armstrong    HPI: Esther Duke is a pleasant 61 y.o. female who presents 2022 for cardiac follow up. I have reviewed her past medial records including notes, labs and testing in preparation for today's visit.      She has no history of blood clots, diabetes, hypertension, asthma, emphysema, seizures, stroke, cancer or blood clots.  She does not exercise regularly due to fibromyalgia.  She also has irritable bowel syndrome and GERD.  She has had severe muscle aching associate with rosuvastatin, atorvastatin and simvastatin.  She is currently on Livalo and has been able to tolerate it.       CT of the abdomen and pelvis done 2019 has limited cardiac imaging showing no evidence of coronary artery calcification on these limited images.  There is noted to be abdominal aortic calcification but no evidence of aneurysm.     She is , has 2 children works as a .  She uses no cigarettes, alcohol or drugs.  She has 2 cups of coffee per day.  Her father had a history of strokes, HTN and atrial fibrillation. He passed away last Fall after suffering \"several strokes\".   Her mother has HTN.     Vascular screening 2021  Interpretation Summary  Normal carotid, aorta, and peripheral artery screening.    She had labs performed 2022 that showed an unremarkable CMP, TSH 2.570.  Lipid panel revealed a total cholesterol 188, HDL 64, , triglycerides 83.    She presents today for her annual cardiac evaluation.  We discussed her recent cholesterol labs, she is tolerating the Livalo without issue.  She denies any palpitations, shortness of breath, lower extremity edema.  She has not had any dizziness, lightheadedness, syncope or " presyncopal episodes.  She denies any chest pain chest pressure or fatigue.    Past Medical History:   Diagnosis Date   • Fibromyalgia    • Gastroesophageal reflux disease without esophagitis    • Hernia, hiatal    • High cholesterol    • IBS (irritable bowel syndrome)    • Other hyperlipidemia    • Reactive depression        Past Surgical History:   Procedure Laterality Date   • COLONOSCOPY         Social History     Socioeconomic History   • Marital status:    Tobacco Use   • Smoking status: Never Smoker   • Smokeless tobacco: Never Used   • Tobacco comment: caffeine use   Vaping Use   • Vaping Use: Never used   Substance and Sexual Activity   • Alcohol use: No   • Drug use: No   • Sexual activity: Defer       Family History   Problem Relation Age of Onset   • Hypertension Mother    • Hyperlipidemia Mother    • Hypertension Father    • Hyperlipidemia Father    • Stroke Father    • Cancer Sister    • Aneurysm Maternal Uncle    • Heart disease Maternal Grandmother    • Colon cancer Neg Hx    • Colon polyps Neg Hx        The following portion of the patient's history were reviewed and updated as appropriate: past medical history, past surgical history, past social history, past family history, allergies, current medications, and problem list.    Review of Systems   Constitutional: Negative for diaphoresis, fever and malaise/fatigue.   HENT: Negative for congestion, hearing loss, hoarse voice, nosebleeds and sore throat.    Eyes: Negative for photophobia, vision loss in left eye, vision loss in right eye and visual disturbance.   Cardiovascular: Negative for chest pain, dyspnea on exertion, irregular heartbeat, leg swelling, near-syncope, orthopnea, palpitations, paroxysmal nocturnal dyspnea and syncope.   Respiratory: Negative for cough, hemoptysis, shortness of breath, sleep disturbances due to breathing, snoring, sputum production and wheezing.    Endocrine: Negative for cold intolerance, heat intolerance,  polydipsia, polyphagia and polyuria.   Hematologic/Lymphatic: Negative for bleeding problem. Does not bruise/bleed easily.   Skin: Negative for color change, dry skin, poor wound healing, rash and suspicious lesions.   Musculoskeletal: Negative for arthritis, back pain, falls, gout, joint pain, joint swelling, muscle cramps, muscle weakness and myalgias.   Gastrointestinal: Negative for bloating, abdominal pain, constipation, diarrhea, dysphagia, melena, nausea and vomiting.   Neurological: Negative for excessive daytime sleepiness, dizziness, headaches, light-headedness, loss of balance, numbness, paresthesias, seizures, vertigo and weakness.   Psychiatric/Behavioral: Negative for depression, memory loss and substance abuse. The patient is not nervous/anxious.        Allergies   Allergen Reactions   • Atorvastatin Myalgia   • Codeine GI Intolerance   • Crestor [Rosuvastatin Calcium] Myalgia   • Simvastatin Myalgia   • Sulfa Antibiotics Hives         Current Outpatient Medications:   •  DULoxetine (CYMBALTA) 30 MG capsule, TAKE ONE CAPSULE BY MOUTH DAILY (TAKES TOTAL OF 90MG), Disp: 30 capsule, Rfl: 5  •  DULoxetine (CYMBALTA) 60 MG capsule, Take 1 capsule by mouth Daily., Disp: 30 capsule, Rfl: 5  •  famotidine (PEPCID) 40 MG tablet, TAKE ONE TABLET BY MOUTH TWICE A DAY WITH LUNCH AND AT BEDTIME, Disp: 180 tablet, Rfl: 3  •  fluticasone (FLONASE) 50 MCG/ACT nasal spray, 2 sprays into the nostril(s) as directed by provider Daily., Disp: 16 g, Rfl: 0  •  gabapentin (NEURONTIN) 300 MG capsule, TAKE ONE CAPSULE BY MOUTH TWICE A DAY, Disp: 60 capsule, Rfl: 2  •  lubiprostone (Amitiza) 8 MCG capsule, Take 1 capsule by mouth 2 (Two) Times a Day With Meals., Disp: 60 capsule, Rfl: 11  •  pantoprazole (PROTONIX) 40 MG EC tablet, TAKE ONE TABLET BY MOUTH TWICE A DAY, Disp: 180 tablet, Rfl: 3  •  Pitavastatin Calcium (Livalo) 4 MG tablet, Take 1 tablet by mouth Every Night., Disp: 30 tablet, Rfl: 5  •  sucralfate (Carafate) 1  "g tablet, Take 1 tablet by mouth every night at bedtime. If cant swallow, may cut or Crush tablet, 30 min after other meds, before bedtime., Disp: 90 tablet, Rfl: 3  •  Xiidra 5 % ophthalmic solution, , Disp: , Rfl:         Objective:     Vitals:    04/25/22 1039   BP: 130/80   Pulse: 68   Resp: 16   SpO2: 97%   Weight: 73.9 kg (163 lb)   Height: 162.6 cm (64\")     Body mass index is 27.98 kg/m².      Vitals reviewed.   Constitutional:       General: Not in acute distress.     Appearance: Normal and healthy appearance. Well-developed.   Eyes:      General:         Right eye: No discharge.         Left eye: No discharge.      Conjunctiva/sclera: Conjunctivae normal.   HENT:      Head: Normocephalic and atraumatic.      Right Ear: External ear normal.      Left Ear: External ear normal.      Nose: Nose normal.   Neck:      Thyroid: No thyromegaly.      Vascular: No JVD.      Trachea: No tracheal deviation.      Lymphadenopathy: No cervical adenopathy.   Pulmonary:      Effort: Pulmonary effort is normal. No respiratory distress.      Breath sounds: Normal breath sounds. No wheezing. No rales.   Chest:      Chest wall: Not tender to palpatation.   Cardiovascular:      Normal rate. Regular rhythm.      No gallop.   Pulses:     Intact distal pulses.   Edema:     Peripheral edema absent.   Abdominal:      General: There is no distension.      Palpations: Abdomen is soft.      Tenderness: There is no abdominal tenderness.   Musculoskeletal: Normal range of motion.         General: No tenderness or deformity.      Cervical back: Normal range of motion and neck supple. Skin:     General: Skin is warm and dry.      Findings: No erythema or rash.   Neurological:      Mental Status: Alert and oriented to person, place, and time.      Coordination: Coordination normal.   Psychiatric:         Attention and Perception: Attention normal.         Mood and Affect: Mood normal.         Speech: Speech normal.         Behavior: Behavior " normal. Behavior is cooperative.         Thought Content: Thought content normal.         Cognition and Memory: Cognition normal.         Judgment: Judgment normal.               ECG 12 Lead    Date/Time: 4/25/2022 10:45 AM  Performed by: Ambar Bray APRN  Authorized by: Ambar Bray APRN   Comparison: compared with previous ECG from 4/21/2021  Similar to previous ECG  Rhythm: sinus rhythm  Rate: normal  Conduction: conduction normal  ST Segments: ST segments normal  T Waves: T waves normal  QRS axis: left    Clinical impression: non-specific ECG              Assessment:       Diagnosis Plan   1. Other hyperlipidemia            Plan:       1.  Hyperlipidemia -good control on Livalo.  Labs as above.  2. Family history of hypertension and atrial fibrillation.  3. Fibromyalgia.  4.  sedentary lifestyle.    TO in 1 year with RM    As always, it has been a pleasure to participate in your patient's care. Thank you.       Sincerely,       MARIANELA Jones      Current Outpatient Medications:   •  DULoxetine (CYMBALTA) 30 MG capsule, TAKE ONE CAPSULE BY MOUTH DAILY (TAKES TOTAL OF 90MG), Disp: 30 capsule, Rfl: 5  •  DULoxetine (CYMBALTA) 60 MG capsule, Take 1 capsule by mouth Daily., Disp: 30 capsule, Rfl: 5  •  famotidine (PEPCID) 40 MG tablet, TAKE ONE TABLET BY MOUTH TWICE A DAY WITH LUNCH AND AT BEDTIME, Disp: 180 tablet, Rfl: 3  •  fluticasone (FLONASE) 50 MCG/ACT nasal spray, 2 sprays into the nostril(s) as directed by provider Daily., Disp: 16 g, Rfl: 0  •  gabapentin (NEURONTIN) 300 MG capsule, TAKE ONE CAPSULE BY MOUTH TWICE A DAY, Disp: 60 capsule, Rfl: 2  •  lubiprostone (Amitiza) 8 MCG capsule, Take 1 capsule by mouth 2 (Two) Times a Day With Meals., Disp: 60 capsule, Rfl: 11  •  pantoprazole (PROTONIX) 40 MG EC tablet, TAKE ONE TABLET BY MOUTH TWICE A DAY, Disp: 180 tablet, Rfl: 3  •  Pitavastatin Calcium (Livalo) 4 MG tablet, Take 1 tablet by mouth Every Night., Disp: 30 tablet, Rfl: 5  •  sucralfate  (Carafate) 1 g tablet, Take 1 tablet by mouth every night at bedtime. If cant swallow, may cut or Crush tablet, 30 min after other meds, before bedtime., Disp: 90 tablet, Rfl: 3  •  Xiidra 5 % ophthalmic solution, , Disp: , Rfl:       Dictated utilizing Dragon dictation

## 2022-05-23 ENCOUNTER — OFFICE VISIT (OUTPATIENT)
Dept: GASTROENTEROLOGY | Facility: CLINIC | Age: 62
End: 2022-05-23

## 2022-05-23 VITALS
SYSTOLIC BLOOD PRESSURE: 118 MMHG | HEIGHT: 64 IN | DIASTOLIC BLOOD PRESSURE: 78 MMHG | WEIGHT: 164 LBS | BODY MASS INDEX: 28 KG/M2

## 2022-05-23 DIAGNOSIS — K58.1 IRRITABLE BOWEL SYNDROME WITH CONSTIPATION: Primary | ICD-10-CM

## 2022-05-23 DIAGNOSIS — Z12.11 ENCOUNTER FOR SCREENING FOR MALIGNANT NEOPLASM OF COLON: ICD-10-CM

## 2022-05-23 DIAGNOSIS — K21.00 GASTROESOPHAGEAL REFLUX DISEASE WITH ESOPHAGITIS WITHOUT HEMORRHAGE: ICD-10-CM

## 2022-05-23 PROCEDURE — 99213 OFFICE O/P EST LOW 20 MIN: CPT | Performed by: INTERNAL MEDICINE

## 2022-05-23 NOTE — PROGRESS NOTES
"    PATIENT INFORMATION  Esther Duke       - 1960    CHIEF COMPLAINT  Chief Complaint   Patient presents with   • Chronic Idiopathic Constipation   • Heartburn                HISTORY OF PRESENT ILLNESS  Has done so well on BID amitiza 8 mcg that she is only taking one a day and moving her bowels daily    SHe had a normal Colonooscopy in ( [facility]) and is therefore due next year     No family history either          REVIEWED PERTINENT RESULTS/ LABS  Lab Results   Component Value Date    CASEREPORT  2019     Surgical Pathology Report                         Case: [id number]                                  Authorizing Provider:  Deanna Lorenzo MD         Collected:           2019 09:43 AM          Pathologist:           Marge Walker MD  Received:            05/15/2019 05:42 AM          Specimens:   1) - Gastric, epigastric                                                                            2) - Small Intestine, small bowel                                                                   3) - Gastric, gastric polyp                                                                         4) - Esophagus, Distal                                                                     FINALDX  2019     1.  Stomach, Biopsy:    A.  Chemical gastropathy.   B.  Negative for Helicobacter by routine staining.   C.  No metaplasia, dysplasia or malignancy identified.     2.  Small Bowel, Biopsy:  Benign small bowel mucosa with   A. Normal intact villous surface.   B. No significant inflammation, no granulomas.   C. No viral inclusions or other organisms on routinely stained sections.     3.  \"Gastric Polyp\", Biopsy:    A.  Fragment of gastric mucosa with chemical gastropathy.   B.  Benign fundic gland polyp.    C.  No metaplasia, dysplasia or malignancy identified.     4.  Distal Esophagus, Biopsy:    A.  Benign gastroesophageal junction mucosa with reflux esophagitis.   B.  Negative " for goblet cell metaplasia and dysplasia.      swm/brb        Lab Results   Component Value Date    HGB 13.9 01/25/2022    MCV 86 01/25/2022     01/25/2022    ALT 14 04/19/2022    AST 16 04/19/2022    TRIG 83 04/19/2022      No results found.    REVIEW OF SYSTEMS  Review of Systems   Constitutional: Negative for activity change, chills, fever and unexpected weight change.   HENT: Negative for congestion.    Eyes: Negative for visual disturbance.   Respiratory: Negative for shortness of breath.    Cardiovascular: Negative for chest pain and palpitations.   Gastrointestinal: Negative for abdominal pain and blood in stool.   Endocrine: Negative for cold intolerance and heat intolerance.   Genitourinary: Negative for hematuria.   Musculoskeletal: Negative for gait problem.   Skin: Negative for color change.   Allergic/Immunologic: Negative for immunocompromised state.   Neurological: Negative for weakness and light-headedness.   Hematological: Negative for adenopathy.   Psychiatric/Behavioral: Negative for sleep disturbance. The patient is not nervous/anxious.          ACTIVE PROBLEMS  Patient Active Problem List    Diagnosis    • Irritable bowel syndrome with constipation [K58.1]    • Gastroesophageal reflux disease with esophagitis without hemorrhage [K21.00]    • Chronic foot pain, right [M79.671, G89.29]    • Fibromyalgia [M79.7]    • Other hyperlipidemia [E78.49]    • Reactive depression [F32.9]          PAST MEDICAL HISTORY  Past Medical History:   Diagnosis Date   • Fibromyalgia    • Gastroesophageal reflux disease without esophagitis    • Hernia, hiatal    • High cholesterol    • IBS (irritable bowel syndrome)    • Other hyperlipidemia    • Reactive depression          SURGICAL HISTORY  Past Surgical History:   Procedure Laterality Date   • COLONOSCOPY           FAMILY HISTORY  Family History   Problem Relation Age of Onset   • Hypertension Mother    • Hyperlipidemia Mother    • Hypertension Father    •  Hyperlipidemia Father    • Stroke Father    • Cancer Sister    • Aneurysm Maternal Uncle    • Heart disease Maternal Grandmother    • Colon cancer Neg Hx    • Colon polyps Neg Hx          SOCIAL HISTORY  Social History     Occupational History   • Not on file   Tobacco Use   • Smoking status: Never Smoker   • Smokeless tobacco: Never Used   • Tobacco comment: caffeine use   Vaping Use   • Vaping Use: Never used   Substance and Sexual Activity   • Alcohol use: No   • Drug use: No   • Sexual activity: Defer         CURRENT MEDICATIONS    Current Outpatient Medications:   •  DULoxetine (CYMBALTA) 30 MG capsule, TAKE ONE CAPSULE BY MOUTH DAILY (TAKES TOTAL OF 90MG), Disp: 30 capsule, Rfl: 5  •  DULoxetine (CYMBALTA) 60 MG capsule, Take 1 capsule by mouth Daily., Disp: 30 capsule, Rfl: 5  •  famotidine (PEPCID) 40 MG tablet, TAKE ONE TABLET BY MOUTH TWICE A DAY WITH LUNCH AND AT BEDTIME, Disp: 180 tablet, Rfl: 3  •  fluticasone (FLONASE) 50 MCG/ACT nasal spray, 2 sprays into the nostril(s) as directed by provider Daily., Disp: 16 g, Rfl: 0  •  gabapentin (NEURONTIN) 300 MG capsule, TAKE ONE CAPSULE BY MOUTH TWICE A DAY, Disp: 60 capsule, Rfl: 2  •  lubiprostone (Amitiza) 8 MCG capsule, Take 1 capsule by mouth 2 (Two) Times a Day With Meals., Disp: 60 capsule, Rfl: 11  •  pantoprazole (PROTONIX) 40 MG EC tablet, TAKE ONE TABLET BY MOUTH TWICE A DAY, Disp: 180 tablet, Rfl: 3  •  Pitavastatin Calcium (Livalo) 4 MG tablet, Take 1 tablet by mouth Every Night., Disp: 30 tablet, Rfl: 5  •  sucralfate (Carafate) 1 g tablet, Take 1 tablet by mouth every night at bedtime. If cant swallow, may cut or Crush tablet, 30 min after other meds, before bedtime., Disp: 90 tablet, Rfl: 3  •  Xiidra 5 % ophthalmic solution, , Disp: , Rfl:     ALLERGIES  Atorvastatin, Codeine, Crestor [rosuvastatin calcium], Simvastatin, and Sulfa antibiotics    VITALS  Vitals:    05/23/22 1420   BP: 118/78   BP Location: Left arm   Patient Position: Sitting  "  Cuff Size: Large Adult   Weight: 74.4 kg (164 lb)   Height: 162.6 cm (64\")       PHYSICAL EXAM  Debilities/Disabilities Identified: None  Emotional Behavior: Appropriate  Wt Readings from Last 3 Encounters:   05/23/22 74.4 kg (164 lb)   04/25/22 73.9 kg (163 lb)   04/19/22 74.4 kg (164 lb)     Ht Readings from Last 1 Encounters:   05/23/22 162.6 cm (64\")     Body mass index is 28.15 kg/m².  Physical Exam  Constitutional:       Appearance: She is well-developed. She is not diaphoretic.   HENT:      Head: Normocephalic and atraumatic.   Eyes:      General: No scleral icterus.     Conjunctiva/sclera: Conjunctivae normal.      Pupils: Pupils are equal, round, and reactive to light.   Neck:      Thyroid: No thyromegaly.   Cardiovascular:      Rate and Rhythm: Normal rate and regular rhythm.      Heart sounds: Normal heart sounds. No murmur heard.    No gallop.   Pulmonary:      Effort: Pulmonary effort is normal.      Breath sounds: Normal breath sounds. No wheezing or rales.   Abdominal:      General: Bowel sounds are normal. There is no distension or abdominal bruit.      Palpations: Abdomen is soft. There is no shifting dullness, fluid wave or mass.      Tenderness: There is no abdominal tenderness. There is no guarding. Negative signs include Franklin's sign.      Hernia: There is no hernia in the ventral area.   Musculoskeletal:         General: Normal range of motion.      Cervical back: Normal range of motion and neck supple.   Lymphadenopathy:      Cervical: No cervical adenopathy.   Skin:     General: Skin is warm and dry.      Findings: No erythema or rash.   Neurological:      Mental Status: She is alert and oriented to person, place, and time.   Psychiatric:         Mood and Affect: Mood normal.         Behavior: Behavior normal.         CLINICAL DATA REVIEWED   reviewed previous lab results and integrated with today's visit, reviewed notes from other physicians and/or last GI encounter, reviewed previous " endoscopy results and available photos, reviewed surgical pathology results from previous biopsies    ASSESSMENT  Diagnoses and all orders for this visit:    Irritable bowel syndrome with constipation    Gastroesophageal reflux disease with esophagitis without hemorrhage    Encounter for screening for malignant neoplasm of colon          PLAN  Has had a great response to meds so will see in 6 months  Return in about 6 months (around 11/23/2022).    I have discussed the above plan with the patient.  They verbalize understanding and are in agreement with the plan.  They have been advised to contact the office for any questions, concerns, or changes related to their health.

## 2022-06-03 RX ORDER — PITAVASTATIN CALCIUM 4.18 MG/1
4 TABLET, FILM COATED ORAL NIGHTLY
Qty: 30 TABLET | Refills: 5 | Status: SHIPPED | OUTPATIENT
Start: 2022-06-03 | End: 2022-06-08 | Stop reason: SDUPTHER

## 2022-06-08 RX ORDER — PITAVASTATIN CALCIUM 4.18 MG/1
4 TABLET, FILM COATED ORAL NIGHTLY
Qty: 30 TABLET | Refills: 5 | Status: SHIPPED | OUTPATIENT
Start: 2022-06-08 | End: 2022-11-30 | Stop reason: SDUPTHER

## 2022-06-29 ENCOUNTER — TELEPHONE (OUTPATIENT)
Dept: GASTROENTEROLOGY | Facility: CLINIC | Age: 62
End: 2022-06-29

## 2022-06-29 NOTE — TELEPHONE ENCOUNTER
PT CALLED REQUESTING HIGHER DOSE FOR AMITI     PHARMACY [facility].

## 2022-06-29 NOTE — TELEPHONE ENCOUNTER
Spoke with pt currently taking Amitiza 8mcg BID along with daily dose of miralax. States up to 2 small mushy BM daily but feels bloated not completely emptying.     LOV: 05/23/22    Pt requesting to increase Amitiza dose

## 2022-06-30 RX ORDER — LUBIPROSTONE 8 UG/1
16 CAPSULE ORAL 2 TIMES DAILY WITH MEALS
Qty: 120 CAPSULE | Refills: 11 | Status: SHIPPED | OUTPATIENT
Start: 2022-06-30 | End: 2022-12-22 | Stop reason: SDUPTHER

## 2022-07-06 DIAGNOSIS — M79.7 FIBROMYALGIA: ICD-10-CM

## 2022-07-06 NOTE — TELEPHONE ENCOUNTER
Caller: Esther Duke    Relationship: Self    Best call back number: [phone]     Requested Prescriptions:   Requested Prescriptions     Pending Prescriptions Disp Refills   • gabapentin (NEURONTIN) 300 MG capsule 60 capsule 2     Sig: Take 1 capsule by mouth 2 (Two) Times a Day.        Pharmacy where request should be sent: [address] - [phone] PH - [phone] FX     Additional details provided by patient: PATIENT IS OUT OF MEDICATION    Does the patient have less than a 3 day supply:  [x] Yes  [] No    Geneva Jo Rep   07/06/22 09:40 EDT

## 2022-07-06 NOTE — TELEPHONE ENCOUNTER
Rx Refill Note  Requested Prescriptions     Pending Prescriptions Disp Refills   • gabapentin (NEURONTIN) 300 MG capsule 60 capsule 2     Sig: Take 1 capsule by mouth 2 (Two) Times a Day.      Last office visit with prescribing clinician: 4/19/2022      Next office visit with prescribing clinician: 7/28/2022            Darek Thomas  07/06/22, 14:24 EDT

## 2022-07-11 RX ORDER — GABAPENTIN 300 MG/1
300 CAPSULE ORAL 2 TIMES DAILY
Qty: 60 CAPSULE | Refills: 3 | Status: SHIPPED | OUTPATIENT
Start: 2022-07-11 | End: 2022-08-10 | Stop reason: SDUPTHER

## 2022-07-12 ENCOUNTER — TELEPHONE (OUTPATIENT)
Dept: GASTROENTEROLOGY | Facility: CLINIC | Age: 62
End: 2022-07-12

## 2022-07-12 NOTE — TELEPHONE ENCOUNTER
Researching insurance coverage for [facility] medication coverage per CMM    Per CMM: available with out requiring authorization     Will inform provider of coverage options

## 2022-08-02 RX ORDER — DULOXETIN HYDROCHLORIDE 60 MG/1
60 CAPSULE, DELAYED RELEASE ORAL DAILY
Qty: 30 CAPSULE | Refills: 5 | Status: SHIPPED | OUTPATIENT
Start: 2022-08-02 | End: 2023-02-03 | Stop reason: SDUPTHER

## 2022-08-09 ENCOUNTER — TELEPHONE (OUTPATIENT)
Dept: GASTROENTEROLOGY | Facility: CLINIC | Age: 62
End: 2022-08-09

## 2022-08-09 NOTE — TELEPHONE ENCOUNTER
PT CALLED STATED THAT SHE IS OUT OF HER SAMPLES OF LINZESS AND SHE WOULD LIKE A RX SENT TO [facility] IN [place]

## 2022-08-10 ENCOUNTER — OFFICE VISIT (OUTPATIENT)
Dept: FAMILY MEDICINE CLINIC | Facility: CLINIC | Age: 62
End: 2022-08-10

## 2022-08-10 VITALS
TEMPERATURE: 98.4 F | RESPIRATION RATE: 16 BRPM | OXYGEN SATURATION: 98 % | HEIGHT: 64 IN | HEART RATE: 75 BPM | DIASTOLIC BLOOD PRESSURE: 78 MMHG | SYSTOLIC BLOOD PRESSURE: 128 MMHG | WEIGHT: 159 LBS | BODY MASS INDEX: 27.14 KG/M2

## 2022-08-10 DIAGNOSIS — M79.7 FIBROMYALGIA: ICD-10-CM

## 2022-08-10 DIAGNOSIS — E78.49 OTHER HYPERLIPIDEMIA: Primary | ICD-10-CM

## 2022-08-10 DIAGNOSIS — F32.9 REACTIVE DEPRESSION: ICD-10-CM

## 2022-08-10 DIAGNOSIS — K21.00 GASTROESOPHAGEAL REFLUX DISEASE WITH ESOPHAGITIS WITHOUT HEMORRHAGE: ICD-10-CM

## 2022-08-10 PROCEDURE — 99214 OFFICE O/P EST MOD 30 MIN: CPT | Performed by: INTERNAL MEDICINE

## 2022-08-10 RX ORDER — GABAPENTIN 300 MG/1
300 CAPSULE ORAL 2 TIMES DAILY
Qty: 60 CAPSULE | Refills: 3 | Status: SHIPPED | OUTPATIENT
Start: 2022-08-10 | End: 2023-01-26 | Stop reason: SDUPTHER

## 2022-08-11 LAB
ALBUMIN SERPL-MCNC: 4.2 G/DL (ref 3.8–4.8)
ALBUMIN/GLOB SERPL: 1.9 {RATIO} (ref 1.2–2.2)
ALP SERPL-CCNC: 83 IU/L (ref 44–121)
ALT SERPL-CCNC: 10 IU/L (ref 0–32)
AST SERPL-CCNC: 14 IU/L (ref 0–40)
BASOPHILS # BLD AUTO: 0.1 X10E3/UL (ref 0–0.2)
BASOPHILS NFR BLD AUTO: 2 %
BILIRUB SERPL-MCNC: <0.2 MG/DL (ref 0–1.2)
BUN SERPL-MCNC: 11 MG/DL (ref 8–27)
BUN/CREAT SERPL: 15 (ref 12–28)
CALCIUM SERPL-MCNC: 9.4 MG/DL (ref 8.7–10.3)
CHLORIDE SERPL-SCNC: 106 MMOL/L (ref 96–106)
CHOLEST SERPL-MCNC: 187 MG/DL (ref 100–199)
CHOLEST/HDLC SERPL: 2.8 RATIO (ref 0–4.4)
CK SERPL-CCNC: 61 U/L (ref 32–182)
CO2 SERPL-SCNC: 26 MMOL/L (ref 20–29)
CREAT SERPL-MCNC: 0.74 MG/DL (ref 0.57–1)
EGFRCR SERPLBLD CKD-EPI 2021: 92 ML/MIN/1.73
EOSINOPHIL # BLD AUTO: 0.5 X10E3/UL (ref 0–0.4)
EOSINOPHIL NFR BLD AUTO: 9 %
ERYTHROCYTE [DISTWIDTH] IN BLOOD BY AUTOMATED COUNT: 13.7 % (ref 11.7–15.4)
GLOBULIN SER CALC-MCNC: 2.2 G/DL (ref 1.5–4.5)
GLUCOSE SERPL-MCNC: 85 MG/DL (ref 65–99)
HCT VFR BLD AUTO: 40.2 % (ref 34–46.6)
HDLC SERPL-MCNC: 66 MG/DL
HGB BLD-MCNC: 12.9 G/DL (ref 11.1–15.9)
IMM GRANULOCYTES # BLD AUTO: 0 X10E3/UL (ref 0–0.1)
IMM GRANULOCYTES NFR BLD AUTO: 0 %
LDLC SERPL CALC-MCNC: 103 MG/DL (ref 0–99)
LYMPHOCYTES # BLD AUTO: 1.5 X10E3/UL (ref 0.7–3.1)
LYMPHOCYTES NFR BLD AUTO: 28 %
MCH RBC QN AUTO: 27.6 PG (ref 26.6–33)
MCHC RBC AUTO-ENTMCNC: 32.1 G/DL (ref 31.5–35.7)
MCV RBC AUTO: 86 FL (ref 79–97)
MONOCYTES # BLD AUTO: 0.5 X10E3/UL (ref 0.1–0.9)
MONOCYTES NFR BLD AUTO: 9 %
NEUTROPHILS # BLD AUTO: 2.8 X10E3/UL (ref 1.4–7)
NEUTROPHILS NFR BLD AUTO: 52 %
PLATELET # BLD AUTO: 335 X10E3/UL (ref 150–450)
POTASSIUM SERPL-SCNC: 4.5 MMOL/L (ref 3.5–5.2)
PROT SERPL-MCNC: 6.4 G/DL (ref 6–8.5)
RBC # BLD AUTO: 4.67 X10E6/UL (ref 3.77–5.28)
SODIUM SERPL-SCNC: 144 MMOL/L (ref 134–144)
TRIGL SERPL-MCNC: 102 MG/DL (ref 0–149)
VLDLC SERPL CALC-MCNC: 18 MG/DL (ref 5–40)
WBC # BLD AUTO: 5.4 X10E3/UL (ref 3.4–10.8)

## 2022-09-01 RX ORDER — DULOXETIN HYDROCHLORIDE 30 MG/1
30 CAPSULE, DELAYED RELEASE ORAL DAILY
Qty: 30 CAPSULE | Refills: 0 | Status: SHIPPED | OUTPATIENT
Start: 2022-09-01 | End: 2022-10-03 | Stop reason: SDUPTHER

## 2022-09-02 ENCOUNTER — TELEPHONE (OUTPATIENT)
Dept: GASTROENTEROLOGY | Facility: CLINIC | Age: 62
End: 2022-09-02

## 2022-09-02 DIAGNOSIS — K58.1 IRRITABLE BOWEL SYNDROME WITH CONSTIPATION: Primary | ICD-10-CM

## 2022-09-02 NOTE — TELEPHONE ENCOUNTER
Patient had been getting samples of Linzess 72 mcg from us and was given savings card to get RX on 8/9/22. Called requesting RX sent to [facility] to fill with card.  Transmitted #90 1 po q d NR today to [facility].  Patient advised.

## 2022-10-03 RX ORDER — DULOXETIN HYDROCHLORIDE 30 MG/1
30 CAPSULE, DELAYED RELEASE ORAL DAILY
Qty: 30 CAPSULE | Refills: 3 | Status: SHIPPED | OUTPATIENT
Start: 2022-10-03 | End: 2023-01-31 | Stop reason: SDUPTHER

## 2022-11-30 ENCOUNTER — OFFICE VISIT (OUTPATIENT)
Dept: FAMILY MEDICINE CLINIC | Facility: CLINIC | Age: 62
End: 2022-11-30

## 2022-11-30 VITALS
BODY MASS INDEX: 27.31 KG/M2 | DIASTOLIC BLOOD PRESSURE: 76 MMHG | SYSTOLIC BLOOD PRESSURE: 116 MMHG | HEIGHT: 64 IN | OXYGEN SATURATION: 97 % | HEART RATE: 85 BPM | RESPIRATION RATE: 16 BRPM | WEIGHT: 160 LBS | TEMPERATURE: 97.8 F

## 2022-11-30 DIAGNOSIS — F32.9 REACTIVE DEPRESSION: ICD-10-CM

## 2022-11-30 DIAGNOSIS — M79.7 FIBROMYALGIA: ICD-10-CM

## 2022-11-30 DIAGNOSIS — E78.49 OTHER HYPERLIPIDEMIA: Primary | ICD-10-CM

## 2022-11-30 DIAGNOSIS — K21.00 GASTROESOPHAGEAL REFLUX DISEASE WITH ESOPHAGITIS WITHOUT HEMORRHAGE: ICD-10-CM

## 2022-11-30 PROCEDURE — 99214 OFFICE O/P EST MOD 30 MIN: CPT | Performed by: INTERNAL MEDICINE

## 2022-11-30 RX ORDER — PITAVASTATIN CALCIUM 4.18 MG/1
4 TABLET, FILM COATED ORAL NIGHTLY
Qty: 30 TABLET | Refills: 5 | Status: SHIPPED | OUTPATIENT
Start: 2022-11-30

## 2022-11-30 NOTE — PROGRESS NOTES
Subjective   Esther Duke is a 62 y.o. female.     Chief Complaint   Patient presents with   • Hyperlipidemia   Depression, GERD, irritable bowel syndrome, fibromyalgia    History of Present Illness   Patient here follow-up for hyperlipidemia, depression, GERD, irritable bowel syndrome, fibromyalgia.  Denies any chest pain shortness of breath.  No nausea vomiting.  Taking current medication.  No SI or HI.  Neurontin is helping her fibromyalgia.  Reports had COVID 3 weeks ago, still have sinus congestion.  Reports clear drainage.  No fever chills, shortness of breath  The following portions of the patient's history were reviewed and updated as appropriate: allergies, current medications, past family history, past medical history, past social history, past surgical history and problem list.    Review of Systems   Constitutional: Negative for activity change, appetite change, fatigue and fever.   HENT: Positive for postnasal drip and sinus pressure. Negative for mouth sores, nosebleeds, rhinorrhea, sneezing and sore throat.    Eyes: Negative for blurred vision and double vision.   Respiratory: Negative.  Negative for shortness of breath.    Cardiovascular: Negative for chest pain, palpitations and leg swelling.   Gastrointestinal: Negative.    Neurological: Negative for dizziness, syncope, light-headedness and headache.   Psychiatric/Behavioral: Negative for agitation, behavioral problems and suicidal ideas.       Allergies   Allergen Reactions   • Atorvastatin Myalgia   • Codeine GI Intolerance   • Crestor [Rosuvastatin Calcium] Myalgia   • Simvastatin Myalgia   • Sulfa Antibiotics Hives       Current Outpatient Medications on File Prior to Visit   Medication Sig Dispense Refill   • DULoxetine (CYMBALTA) 30 MG capsule Take 1 capsule by mouth Daily. 30 capsule 3   • DULoxetine (CYMBALTA) 60 MG capsule Take 1 capsule by mouth Daily. 30 capsule 5   • famotidine (PEPCID) 40 MG tablet TAKE ONE TABLET BY MOUTH TWICE A  DAY WITH LUNCH AND AT BEDTIME 180 tablet 3   • fluticasone (FLONASE) 50 MCG/ACT nasal spray 2 sprays into the nostril(s) as directed by provider Daily. 16 g 0   • gabapentin (NEURONTIN) 300 MG capsule Take 1 capsule by mouth 2 (Two) Times a Day. 60 capsule 3   • linaclotide (Linzess) 72 MCG capsule capsule Take 1 capsule by mouth Every Morning Before Breakfast. 90 capsule 0   • pantoprazole (PROTONIX) 40 MG EC tablet TAKE ONE TABLET BY MOUTH TWICE A  tablet 3   • Xiidra 5 % ophthalmic solution      • [DISCONTINUED] Pitavastatin Calcium (Livalo) 4 MG tablet Take 1 tablet by mouth Every Night. 30 tablet 5   • lubiprostone (Amitiza) 8 MCG capsule Take 1 capsule by mouth 2 (Two) Times a Day With Meals. 60 capsule 11   • lubiprostone (Amitiza) 8 MCG capsule Take 2 capsules by mouth 2 (Two) Times a Day With Meals. 120 capsule 11   • sucralfate (Carafate) 1 g tablet Take 1 tablet by mouth every night at bedtime. If cant swallow, may cut or Crush tablet, 30 min after other meds, before bedtime. 90 tablet 3     No current facility-administered medications on file prior to visit.       Family History   Problem Relation Age of Onset   • Hypertension Mother    • Hyperlipidemia Mother    • Hypertension Father    • Hyperlipidemia Father    • Stroke Father    • Cancer Sister    • Aneurysm Maternal Uncle    • Heart disease Maternal Grandmother    • Colon cancer Neg Hx    • Colon polyps Neg Hx        Past Medical History:   Diagnosis Date   • Fibromyalgia    • Gastroesophageal reflux disease without esophagitis    • Hernia, hiatal    • High cholesterol    • IBS (irritable bowel syndrome)    • Other hyperlipidemia    • Reactive depression        Past Surgical History:   Procedure Laterality Date   • COLONOSCOPY         Social History     Socioeconomic History   • Marital status:    Tobacco Use   • Smoking status: Never   • Smokeless tobacco: Never   • Tobacco comments:     caffeine use   Vaping Use   • Vaping Use: Never  "used   Substance and Sexual Activity   • Alcohol use: No   • Drug use: No   • Sexual activity: Defer       Patient Active Problem List   Diagnosis   • Fibromyalgia   • Other hyperlipidemia   • Reactive depression   • Chronic foot pain, right   • Irritable bowel syndrome with constipation   • Gastroesophageal reflux disease with esophagitis without hemorrhage       /76 (BP Location: Left arm, Patient Position: Sitting, Cuff Size: Large Adult)   Pulse 85   Temp 97.8 °F (36.6 °C)   Resp 16   Ht 162.6 cm (64\")   Wt 72.6 kg (160 lb)   SpO2 97%   BMI 27.46 kg/m²   Body mass index is 27.46 kg/m².    Objective   Physical Exam  Vitals and nursing note reviewed.   Constitutional:       Appearance: She is well-developed.   Neck:      Thyroid: No thyromegaly.      Vascular: No JVD.      Trachea: No tracheal deviation.   Cardiovascular:      Rate and Rhythm: Normal rate and regular rhythm.      Heart sounds: No murmur heard.  Pulmonary:      Effort: Pulmonary effort is normal. No respiratory distress.      Breath sounds: Normal breath sounds. No stridor. No wheezing or rhonchi.   Abdominal:      General: Bowel sounds are normal. There is no distension.      Palpations: Abdomen is soft. There is no mass.      Tenderness: There is no abdominal tenderness. There is no right CVA tenderness, left CVA tenderness, guarding or rebound.      Hernia: No hernia is present.   Musculoskeletal:      Cervical back: Normal range of motion and neck supple.   Lymphadenopathy:      Cervical: No cervical adenopathy.   Neurological:      General: No focal deficit present.      Mental Status: She is alert and oriented to person, place, and time. Mental status is at baseline.   Psychiatric:         Mood and Affect: Mood normal.         Behavior: Behavior normal.           Assessment & Plan   Diagnoses and all orders for this visit:    1. Other hyperlipidemia (Primary)  -     CBC & Differential  -     Comprehensive Metabolic Panel  -     " CK  -     Lipid Panel With / Chol / HDL Ratio  -     TSH    2. Reactive depression  -     CBC & Differential  -     Comprehensive Metabolic Panel  -     CK  -     Lipid Panel With / Chol / HDL Ratio  -     TSH    3. Gastroesophageal reflux disease with esophagitis without hemorrhage    4. Fibromyalgia    Other orders  -     Pitavastatin Calcium (Livalo) 4 MG tablet; Take 1 tablet by mouth Every Night.  Dispense: 30 tablet; Refill: 5    Continue taking Protonix, Pepcid helping her GERD symptoms.  Continue Linzess for her constipation.  Continues on Neurontin for her fibromyalgia.  Patient is on Livalo, Cymbalta.  Cymbalta is helping her depression.  Return in 3 months time.  All her problems are chronic and stable.  Next visit will need Matthew and consent .  No antibiotics needed as sinuse drainage is clear.  Observe for now  EHR dragon/transcription disclaimer:  Part of this note are created by electronic transcription/translation of spoken language to printed text and thus may lead to erroneous, or at times, nonsensical words or phrases inadvertently transcribed.  Although I have reviewed for such errors, some may still exist.

## 2022-12-01 LAB
ALBUMIN SERPL-MCNC: 4.6 G/DL (ref 3.5–5.2)
ALBUMIN/GLOB SERPL: 2.1 G/DL
ALP SERPL-CCNC: 103 U/L (ref 39–117)
ALT SERPL-CCNC: 12 U/L (ref 1–33)
AST SERPL-CCNC: 20 U/L (ref 1–32)
BASOPHILS # BLD AUTO: 0.07 10*3/MM3 (ref 0–0.2)
BASOPHILS NFR BLD AUTO: 1.3 % (ref 0–1.5)
BILIRUB SERPL-MCNC: 0.3 MG/DL (ref 0–1.2)
BUN SERPL-MCNC: 8 MG/DL (ref 8–23)
BUN/CREAT SERPL: 10.4 (ref 7–25)
CALCIUM SERPL-MCNC: 9.9 MG/DL (ref 8.6–10.5)
CHLORIDE SERPL-SCNC: 101 MMOL/L (ref 98–107)
CHOLEST SERPL-MCNC: 214 MG/DL (ref 0–200)
CHOLEST/HDLC SERPL: 2.58 {RATIO}
CK SERPL-CCNC: 95 U/L (ref 20–180)
CO2 SERPL-SCNC: 28 MMOL/L (ref 22–29)
CREAT SERPL-MCNC: 0.77 MG/DL (ref 0.57–1)
EGFRCR SERPLBLD CKD-EPI 2021: 87.9 ML/MIN/1.73
EOSINOPHIL # BLD AUTO: 0.52 10*3/MM3 (ref 0–0.4)
EOSINOPHIL NFR BLD AUTO: 9.8 % (ref 0.3–6.2)
ERYTHROCYTE [DISTWIDTH] IN BLOOD BY AUTOMATED COUNT: 13.4 % (ref 12.3–15.4)
GLOBULIN SER CALC-MCNC: 2.2 GM/DL
GLUCOSE SERPL-MCNC: 103 MG/DL (ref 65–99)
HCT VFR BLD AUTO: 41.8 % (ref 34–46.6)
HDLC SERPL-MCNC: 83 MG/DL (ref 40–60)
HGB BLD-MCNC: 13.4 G/DL (ref 12–15.9)
IMM GRANULOCYTES # BLD AUTO: 0.01 10*3/MM3 (ref 0–0.05)
IMM GRANULOCYTES NFR BLD AUTO: 0.2 % (ref 0–0.5)
LDLC SERPL CALC-MCNC: 114 MG/DL (ref 0–100)
LYMPHOCYTES # BLD AUTO: 1.68 10*3/MM3 (ref 0.7–3.1)
LYMPHOCYTES NFR BLD AUTO: 31.7 % (ref 19.6–45.3)
MCH RBC QN AUTO: 27.2 PG (ref 26.6–33)
MCHC RBC AUTO-ENTMCNC: 32.1 G/DL (ref 31.5–35.7)
MCV RBC AUTO: 85 FL (ref 79–97)
MONOCYTES # BLD AUTO: 0.5 10*3/MM3 (ref 0.1–0.9)
MONOCYTES NFR BLD AUTO: 9.4 % (ref 5–12)
NEUTROPHILS # BLD AUTO: 2.52 10*3/MM3 (ref 1.7–7)
NEUTROPHILS NFR BLD AUTO: 47.6 % (ref 42.7–76)
NRBC BLD AUTO-RTO: 0 /100 WBC (ref 0–0.2)
PLATELET # BLD AUTO: 434 10*3/MM3 (ref 140–450)
POTASSIUM SERPL-SCNC: 4.5 MMOL/L (ref 3.5–5.2)
PROT SERPL-MCNC: 6.8 G/DL (ref 6–8.5)
RBC # BLD AUTO: 4.92 10*6/MM3 (ref 3.77–5.28)
SODIUM SERPL-SCNC: 137 MMOL/L (ref 136–145)
TRIGL SERPL-MCNC: 96 MG/DL (ref 0–150)
TSH SERPL DL<=0.005 MIU/L-ACNC: 2.08 UIU/ML (ref 0.27–4.2)
VLDLC SERPL CALC-MCNC: 17 MG/DL (ref 5–40)
WBC # BLD AUTO: 5.3 10*3/MM3 (ref 3.4–10.8)

## 2022-12-14 DIAGNOSIS — K58.1 IRRITABLE BOWEL SYNDROME WITH CONSTIPATION: ICD-10-CM

## 2022-12-15 RX ORDER — LINACLOTIDE 72 UG/1
CAPSULE, GELATIN COATED ORAL
Qty: 90 CAPSULE | Refills: 3 | Status: SHIPPED | OUTPATIENT
Start: 2022-12-15 | End: 2023-02-17

## 2022-12-22 ENCOUNTER — PREP FOR SURGERY (OUTPATIENT)
Dept: SURGERY | Facility: SURGERY CENTER | Age: 62
End: 2022-12-22

## 2022-12-22 ENCOUNTER — OFFICE VISIT (OUTPATIENT)
Dept: GASTROENTEROLOGY | Facility: CLINIC | Age: 62
End: 2022-12-22

## 2022-12-22 VITALS
WEIGHT: 162.2 LBS | DIASTOLIC BLOOD PRESSURE: 82 MMHG | HEIGHT: 64 IN | SYSTOLIC BLOOD PRESSURE: 132 MMHG | BODY MASS INDEX: 27.69 KG/M2

## 2022-12-22 DIAGNOSIS — K21.9 GASTROESOPHAGEAL REFLUX DISEASE WITHOUT ESOPHAGITIS: ICD-10-CM

## 2022-12-22 DIAGNOSIS — Z12.11 ENCOUNTER FOR SCREENING FOR MALIGNANT NEOPLASM OF COLON: ICD-10-CM

## 2022-12-22 DIAGNOSIS — K58.1 IRRITABLE BOWEL SYNDROME WITH CONSTIPATION: Primary | ICD-10-CM

## 2022-12-22 DIAGNOSIS — Z12.11 SCREEN FOR COLON CANCER: Primary | ICD-10-CM

## 2022-12-22 DIAGNOSIS — K21.00 GASTROESOPHAGEAL REFLUX DISEASE WITH ESOPHAGITIS WITHOUT HEMORRHAGE: ICD-10-CM

## 2022-12-22 PROCEDURE — 99213 OFFICE O/P EST LOW 20 MIN: CPT | Performed by: INTERNAL MEDICINE

## 2022-12-22 RX ORDER — SUCRALFATE 1 G/1
1 TABLET ORAL
Qty: 90 TABLET | Refills: 3 | Status: SHIPPED | OUTPATIENT
Start: 2022-12-22

## 2022-12-22 NOTE — PROGRESS NOTES
"    PATIENT INFORMATION  Esther Duke       - 1960    CHIEF COMPLAINT  Chief Complaint   Patient presents with   • Heartburn   • Constipation       HISTORY OF PRESENT ILLNESS  Here for Follow up of Her IBS-C and her insurance had her switch to Linzess from Amitiza and taking daily not moving daily and feels better when she does so wants in increas in her dose.    No recent Abx and is Not on a Probiotic so we reviewed advantages to those.              REVIEWED PERTINENT RESULTS/ LABS  Lab Results   Component Value Date    CASEREPORT  2019     Surgical Pathology Report                         Case: [id number]                                  Authorizing Provider:  Deanna Lorenzo MD         Collected:           2019 09:43 AM          Pathologist:           Marge Walker MD  Received:            05/15/2019 05:42 AM          Specimens:   1) - Gastric, epigastric                                                                            2) - Small Intestine, small bowel                                                                   3) - Gastric, gastric polyp                                                                         4) - Esophagus, Distal                                                                     FINALDX  2019     1.  Stomach, Biopsy:    A.  Chemical gastropathy.   B.  Negative for Helicobacter by routine staining.   C.  No metaplasia, dysplasia or malignancy identified.     2.  Small Bowel, Biopsy:  Benign small bowel mucosa with   A. Normal intact villous surface.   B. No significant inflammation, no granulomas.   C. No viral inclusions or other organisms on routinely stained sections.     3.  \"Gastric Polyp\", Biopsy:    A.  Fragment of gastric mucosa with chemical gastropathy.   B.  Benign fundic gland polyp.    C.  No metaplasia, dysplasia or malignancy identified.     4.  Distal Esophagus, Biopsy:    A.  Benign gastroesophageal junction mucosa with " reflux esophagitis.   B.  Negative for goblet cell metaplasia and dysplasia.      swm/brb        Lab Results   Component Value Date    HGB 13.4 11/30/2022    MCV 85.0 11/30/2022     11/30/2022    ALT 12 11/30/2022    AST 20 11/30/2022    TRIG 96 11/30/2022      No results found.    REVIEW OF SYSTEMS  Review of Systems   Constitutional: Negative.    HENT: Negative.    Eyes: Negative.    Respiratory: Negative.    Cardiovascular: Negative.    Gastrointestinal: Positive for abdominal distention, abdominal pain, blood in stool and constipation.        GERD and IBS   Endocrine: Negative.    Genitourinary: Negative.    Musculoskeletal: Negative.    Skin: Negative.    Allergic/Immunologic: Negative.    Neurological: Negative.    Hematological: Negative.    Psychiatric/Behavioral: The patient is nervous/anxious.          ACTIVE PROBLEMS  Patient Active Problem List    Diagnosis    • Irritable bowel syndrome with constipation [K58.1]    • Gastroesophageal reflux disease with esophagitis without hemorrhage [K21.00]    • Chronic foot pain, right [M79.671, G89.29]    • Fibromyalgia [M79.7]    • Other hyperlipidemia [E78.49]    • Reactive depression [F32.9]          PAST MEDICAL HISTORY  Past Medical History:   Diagnosis Date   • Fibromyalgia    • Gastroesophageal reflux disease without esophagitis    • Hernia, hiatal    • High cholesterol    • IBS (irritable bowel syndrome)    • Other hyperlipidemia    • Reactive depression          SURGICAL HISTORY  Past Surgical History:   Procedure Laterality Date   • COLONOSCOPY           FAMILY HISTORY  Family History   Problem Relation Age of Onset   • Hypertension Mother    • Hyperlipidemia Mother    • Hypertension Father    • Hyperlipidemia Father    • Stroke Father    • Cancer Sister    • Aneurysm Maternal Uncle    • Heart disease Maternal Grandmother    • Colon cancer Neg Hx    • Colon polyps Neg Hx          SOCIAL HISTORY  Social History     Occupational History   • Not on file    Tobacco Use   • Smoking status: Never   • Smokeless tobacco: Never   • Tobacco comments:     caffeine use   Vaping Use   • Vaping Use: Never used   Substance and Sexual Activity   • Alcohol use: No   • Drug use: No   • Sexual activity: Defer         CURRENT MEDICATIONS    Current Outpatient Medications:   •  DULoxetine (CYMBALTA) 30 MG capsule, Take 1 capsule by mouth Daily., Disp: 30 capsule, Rfl: 3  •  DULoxetine (CYMBALTA) 60 MG capsule, Take 1 capsule by mouth Daily., Disp: 30 capsule, Rfl: 5  •  famotidine (PEPCID) 40 MG tablet, TAKE ONE TABLET BY MOUTH TWICE A DAY WITH LUNCH AND AT BEDTIME, Disp: 180 tablet, Rfl: 3  •  fluticasone (FLONASE) 50 MCG/ACT nasal spray, 2 sprays into the nostril(s) as directed by provider Daily., Disp: 16 g, Rfl: 0  •  gabapentin (NEURONTIN) 300 MG capsule, Take 1 capsule by mouth 2 (Two) Times a Day., Disp: 60 capsule, Rfl: 3  •  Linzess 72 MCG capsule capsule, TAKE ONE CAPSULE BY MOUTH EVERY MORNING BEFORE BREAKFAST, Disp: 90 capsule, Rfl: 3  •  lubiprostone (Amitiza) 8 MCG capsule, Take 1 capsule by mouth 2 (Two) Times a Day With Meals., Disp: 60 capsule, Rfl: 11  •  pantoprazole (PROTONIX) 40 MG EC tablet, TAKE ONE TABLET BY MOUTH TWICE A DAY, Disp: 180 tablet, Rfl: 3  •  Pitavastatin Calcium (Livalo) 4 MG tablet, Take 1 tablet by mouth Every Night., Disp: 30 tablet, Rfl: 5  •  sucralfate (Carafate) 1 g tablet, Take 1 tablet by mouth every night at bedtime. If cant swallow, may cut or Crush tablet, 30 min after other meds, before bedtime., Disp: 90 tablet, Rfl: 3  •  Xiidra 5 % ophthalmic solution, , Disp: , Rfl:   •  linaclotide (LINZESS) 145 MCG capsule capsule, Take 1 capsule by mouth Every Morning Before Breakfast., Disp: 90 capsule, Rfl: 3    ALLERGIES  Atorvastatin, Codeine, Crestor [rosuvastatin calcium], Simvastatin, and Sulfa antibiotics    VITALS  Vitals:    12/22/22 0847   BP: 132/82   BP Location: Left arm   Patient Position: Sitting   Cuff Size: Adult   Weight:  "73.6 kg (162 lb 3.2 oz)   Height: 162.6 cm (64.02\")       PHYSICAL EXAM  Debilities/Disabilities Identified: None  Emotional Behavior: Appropriate  Wt Readings from Last 3 Encounters:   12/22/22 73.6 kg (162 lb 3.2 oz)   11/30/22 72.6 kg (160 lb)   08/10/22 72.1 kg (159 lb)     Ht Readings from Last 1 Encounters:   12/22/22 162.6 cm (64.02\")     Body mass index is 27.83 kg/m².  Physical Exam  Constitutional:       Appearance: She is well-developed. She is not diaphoretic.   HENT:      Head: Normocephalic and atraumatic.   Eyes:      General: No scleral icterus.     Conjunctiva/sclera: Conjunctivae normal.      Pupils: Pupils are equal, round, and reactive to light.   Neck:      Thyroid: No thyromegaly.   Cardiovascular:      Rate and Rhythm: Normal rate and regular rhythm.      Heart sounds: Normal heart sounds. No murmur heard.    No gallop.   Pulmonary:      Effort: Pulmonary effort is normal.      Breath sounds: Normal breath sounds. No wheezing or rales.   Abdominal:      General: Bowel sounds are normal. There is no distension or abdominal bruit.      Palpations: Abdomen is soft. There is no shifting dullness, fluid wave or mass.      Tenderness: There is abdominal tenderness in the right upper quadrant, right lower quadrant, epigastric area and left lower quadrant. There is no guarding. Negative signs include Franklin's sign.      Hernia: There is no hernia in the ventral area.   Musculoskeletal:         General: Normal range of motion.      Cervical back: Normal range of motion and neck supple.   Lymphadenopathy:      Cervical: No cervical adenopathy.   Skin:     General: Skin is warm and dry.      Findings: No erythema or rash.   Neurological:      Mental Status: She is alert and oriented to person, place, and time.         CLINICAL DATA REVIEWED   reviewed previous lab results and integrated with today's visit, reviewed notes from other physicians and/or last GI encounter, reviewed previous endoscopy results " and available photos, reviewed surgical pathology results from previous biopsies    ASSESSMENT  Diagnoses and all orders for this visit:    Irritable bowel syndrome with constipation    Gastroesophageal reflux disease with esophagitis without hemorrhage    Encounter for screening for malignant neoplasm of colon    Gastroesophageal reflux disease without esophagitis  -     sucralfate (Carafate) 1 g tablet; Take 1 tablet by mouth every night at bedtime. If cant swallow, may cut or Crush tablet, 30 min after other meds, before bedtime.    Other orders  -     linaclotide (LINZESS) 145 MCG capsule capsule; Take 1 capsule by mouth Every Morning Before Breakfast.          PLAN  Will increase her Linzess to 145 and OK to refill her Carafate anytime, will also have her schedule her 10 year colon screen anytime this year (2023)    Return in about 1 year (around 12/22/2023).    I have discussed the above plan with the patient.  They verbalize understanding and are in agreement with the plan.  They have been advised to contact the office for any questions, concerns, or changes related to their health.

## 2023-01-26 DIAGNOSIS — M79.7 FIBROMYALGIA: ICD-10-CM

## 2023-01-26 NOTE — TELEPHONE ENCOUNTER
Caller: Esther Duke    Relationship: Self    Best call back number: [phone]    Requested Prescriptions:   Requested Prescriptions     Pending Prescriptions Disp Refills   • gabapentin (NEURONTIN) 300 MG capsule 60 capsule 3     Sig: Take 1 capsule by mouth 2 (Two) Times a Day.        Pharmacy where request should be sent: [facility] [id number] [facility] [address] [facility] [address] - [phone]  - [phone] FX     Additional details provided by patient: PATIENT STATES SHE ONLY HAS ONE CAPSULE REMAINING.    Does the patient have less than a 3 day supply:  [x] Yes  [] No    Would you like a call back once the refill request has been completed: [] Yes [x] No    If the office needs to give you a call back, can they leave a voicemail: [] Yes [x] No    Haylee Velasquez   01/26/23 14:03 EST

## 2023-01-29 RX ORDER — GABAPENTIN 300 MG/1
300 CAPSULE ORAL 2 TIMES DAILY
Qty: 60 CAPSULE | Refills: 3 | Status: SHIPPED | OUTPATIENT
Start: 2023-01-29

## 2023-01-31 RX ORDER — DULOXETIN HYDROCHLORIDE 30 MG/1
30 CAPSULE, DELAYED RELEASE ORAL DAILY
Qty: 30 CAPSULE | Refills: 3 | Status: SHIPPED | OUTPATIENT
Start: 2023-01-31

## 2023-01-31 NOTE — TELEPHONE ENCOUNTER
Caller: Esther Duke    Relationship: Self    Best call back number: [phone]    Requested Prescriptions:   Requested Prescriptions     Pending Prescriptions Disp Refills   • DULoxetine (CYMBALTA) 30 MG capsule 30 capsule 3     Sig: Take 1 capsule by mouth Daily.        Pharmacy where request should be sent: [facility] [id number] [place], [place] - [address] [facility] [address] - [phone]  - [phone] FX     Additional details provided by patient: ONLY HAS 3 LEFT    Does the patient have less than a 3 day supply:  [x] Yes  [] No    Would you like a call back once the refill request has been completed: [] Yes [x] No    If the office needs to give you a call back, can they leave a voicemail: [] Yes [x] No    Geneva Pedraza Rep   01/31/23 09:21 EST         ”

## 2023-02-03 NOTE — TELEPHONE ENCOUNTER
Caller: Esther Duke    Relationship: Self    Best call back number: [phone]    Requested Prescriptions:   Requested Prescriptions     Pending Prescriptions Disp Refills   • DULoxetine (CYMBALTA) 60 MG capsule 30 capsule 5     Sig: Take 1 capsule by mouth Daily.        Pharmacy where request should be sent: [id number] [place] [facility] [address] [facility] [address] - [phone]  - [phone] FX     Additional details provided by patient: OUT OF MEDICATION. ASKS THAT PRESCRIPTION BE SENT BY NOON     Does the patient have less than a 3 day supply:  [x] Yes  [] No    Would you like a call back once the refill request has been completed: [] Yes [] No    If the office needs to give you a call back, can they leave a voicemail: [] Yes [] No    Geneva Newman Rep   02/03/23 10:14 EST

## 2023-02-07 RX ORDER — DULOXETIN HYDROCHLORIDE 60 MG/1
60 CAPSULE, DELAYED RELEASE ORAL DAILY
Qty: 30 CAPSULE | Refills: 5 | Status: SHIPPED | OUTPATIENT
Start: 2023-02-07

## 2023-02-07 NOTE — TELEPHONE ENCOUNTER
PATIENT IS CALLING IN SHE STATES THAT SHE HAS BEEN TRYING TO GET THIS FILLED SINCE LAST Friday, STILL NOTHING AT PHARMACY AND SHE IS OUT OF THIS MEDICATION.    SHE STATES THAT SHE IS HAVING TO DOUBLE UP ON HER 30 MG AND SHE IS GOING TO RUN OUT OF THOSE SOONER.      PLEASE LET HER KNOW WHEN SENT TO PHARMACY    CALLBACK NUMBER IS  [phone]

## 2023-02-15 ENCOUNTER — TELEPHONE (OUTPATIENT)
Dept: GASTROENTEROLOGY | Facility: CLINIC | Age: 63
End: 2023-02-15
Payer: COMMERCIAL

## 2023-02-15 DIAGNOSIS — K21.00 GASTROESOPHAGEAL REFLUX DISEASE WITH ESOPHAGITIS WITHOUT HEMORRHAGE: ICD-10-CM

## 2023-02-15 DIAGNOSIS — R14.0 BLOATING: ICD-10-CM

## 2023-02-15 RX ORDER — PANTOPRAZOLE SODIUM 40 MG/1
40 TABLET, DELAYED RELEASE ORAL 2 TIMES DAILY
Qty: 180 TABLET | Refills: 3 | Status: SHIPPED | OUTPATIENT
Start: 2023-02-15

## 2023-02-15 NOTE — TELEPHONE ENCOUNTER
Per LOV: 12/22/22    Per NOTE: Here for Follow up of Her IBS-C and her insurance had her switch to Linzess from Amitiza and taking daily not moving daily and feels better when she does so wants in increas in her dose.    Pt on Linzess increase to 145mcg     Deleted PA from M pt on Linzess not Amitiza

## 2023-02-16 DIAGNOSIS — K21.00 GASTROESOPHAGEAL REFLUX DISEASE WITH ESOPHAGITIS WITHOUT HEMORRHAGE: ICD-10-CM

## 2023-02-16 DIAGNOSIS — R10.13 DYSPEPSIA: ICD-10-CM

## 2023-02-16 DIAGNOSIS — K58.1 IRRITABLE BOWEL SYNDROME WITH CONSTIPATION: ICD-10-CM

## 2023-02-16 RX ORDER — FAMOTIDINE 40 MG/1
40 TABLET, FILM COATED ORAL 2 TIMES DAILY
Qty: 180 TABLET | Refills: 0 | Status: SHIPPED | OUTPATIENT
Start: 2023-02-16

## 2023-02-17 ENCOUNTER — OFFICE VISIT (OUTPATIENT)
Dept: GASTROENTEROLOGY | Facility: CLINIC | Age: 63
End: 2023-02-17
Payer: COMMERCIAL

## 2023-02-17 VITALS
SYSTOLIC BLOOD PRESSURE: 132 MMHG | WEIGHT: 161 LBS | BODY MASS INDEX: 27.49 KG/M2 | HEIGHT: 64 IN | DIASTOLIC BLOOD PRESSURE: 80 MMHG

## 2023-02-17 DIAGNOSIS — K21.00 GASTROESOPHAGEAL REFLUX DISEASE WITH ESOPHAGITIS WITHOUT HEMORRHAGE: Primary | ICD-10-CM

## 2023-02-17 DIAGNOSIS — K58.1 IRRITABLE BOWEL SYNDROME WITH CONSTIPATION: ICD-10-CM

## 2023-02-17 PROCEDURE — 99214 OFFICE O/P EST MOD 30 MIN: CPT

## 2023-02-17 NOTE — PROGRESS NOTES
PATIENT INFORMATION  Esther Duke       - 1960    CHIEF COMPLAINT  Chief Complaint   Patient presents with   • Abdominal Pain     burning       HISTORY OF PRESENT ILLNESS    Here today for GERD and CIC follow-up    At LOV was doing well on BID PPI and pepcid, carafate PRN. Last EGD 2019 with chemical gastritis, normal small bowel, reflux esophagitis, negative for Barretts, HP. Avoiding NSAIDs. Concerned with reflux and afraid of esophageal cancer, reviewed EGD results with patient and negative for barretts. Sucks on sour candy in the morning to take care of hunger feeling, no pains, reflux, HB, nausea, dysphagia. Concerned about ulcer. Eating early, no snacking, able to eat ok, no loss of appetite or early satiety. Reviewed PPI AM and before dinner, pepcid lunch and bedtime. Pains a month ago when made appt, but was high stress time and feeling better now. Reviewed can take carafate up to 4 times a day PRN.    Increased her linzess to 145, was previously doing well on amitiza, but insurance refused medication. Better with this dose, moving every day and easy to pass. Was previously on chronic senna. Easy to pass, rarely skips a day, reviewed can use miralax those days. No bleeding.      Next screening 10 yr colon scheduled this .       REVIEWED PERTINENT RESULTS/ LABS  Lab Results   Component Value Date    CASEREPORT  2019     Surgical Pathology Report                         Case: [id number]                                  Authorizing Provider:  Deanna Lorenzo MD         Collected:           2019 09:43 AM          Pathologist:           Marge Walker MD  Received:            05/15/2019 05:42 AM          Specimens:   1) - Gastric, epigastric                                                                            2) - Small Intestine, small bowel                                                                   3) - Gastric, gastric polyp                               "                                           4) - Esophagus, Distal                                                                     FINALDX  05/14/2019     1.  Stomach, Biopsy:    A.  Chemical gastropathy.   B.  Negative for Helicobacter by routine staining.   C.  No metaplasia, dysplasia or malignancy identified.     2.  Small Bowel, Biopsy:  Benign small bowel mucosa with   A. Normal intact villous surface.   B. No significant inflammation, no granulomas.   C. No viral inclusions or other organisms on routinely stained sections.     3.  \"Gastric Polyp\", Biopsy:    A.  Fragment of gastric mucosa with chemical gastropathy.   B.  Benign fundic gland polyp.    C.  No metaplasia, dysplasia or malignancy identified.     4.  Distal Esophagus, Biopsy:    A.  Benign gastroesophageal junction mucosa with reflux esophagitis.   B.  Negative for goblet cell metaplasia and dysplasia.      swm/brb        Lab Results   Component Value Date    HGB 13.4 11/30/2022    MCV 85.0 11/30/2022     11/30/2022    ALT 12 11/30/2022    AST 20 11/30/2022    TRIG 96 11/30/2022      No results found.    REVIEW OF SYSTEMS  Review of Systems   Constitutional: Negative.    HENT: Negative.    Eyes: Negative.    Respiratory: Negative.    Cardiovascular: Negative.    Gastrointestinal: Positive for abdominal pain (burning) and constipation.        GERD   Endocrine: Negative.    Genitourinary: Negative.    Musculoskeletal: Negative.    Skin: Negative.    Allergic/Immunologic: Negative.    Neurological: Negative.    Hematological: Negative.    Psychiatric/Behavioral: Negative.          ACTIVE PROBLEMS  Patient Active Problem List    Diagnosis    • Screen for colon cancer [Z12.11]    • Irritable bowel syndrome with constipation [K58.1]    • Gastroesophageal reflux disease with esophagitis without hemorrhage [K21.00]    • Chronic foot pain, right [M79.671, G89.29]    • Fibromyalgia [M79.7]    • Other hyperlipidemia [E78.49]    • Reactive " depression [F32.9]          PAST MEDICAL HISTORY  Past Medical History:   Diagnosis Date   • Fibromyalgia    • Gastroesophageal reflux disease without esophagitis    • Hernia, hiatal    • High cholesterol    • IBS (irritable bowel syndrome)    • Other hyperlipidemia    • Reactive depression          SURGICAL HISTORY  Past Surgical History:   Procedure Laterality Date   • COLONOSCOPY           FAMILY HISTORY  Family History   Problem Relation Age of Onset   • Hypertension Mother    • Hyperlipidemia Mother    • Hypertension Father    • Hyperlipidemia Father    • Stroke Father    • Cancer Sister    • Aneurysm Maternal Uncle    • Heart disease Maternal Grandmother    • Colon cancer Neg Hx    • Colon polyps Neg Hx          SOCIAL HISTORY  Social History     Occupational History   • Not on file   Tobacco Use   • Smoking status: Never   • Smokeless tobacco: Never   • Tobacco comments:     caffeine use   Vaping Use   • Vaping Use: Never used   Substance and Sexual Activity   • Alcohol use: No   • Drug use: No   • Sexual activity: Defer         CURRENT MEDICATIONS    Current Outpatient Medications:   •  DULoxetine (CYMBALTA) 30 MG capsule, Take 1 capsule by mouth Daily., Disp: 30 capsule, Rfl: 3  •  DULoxetine (CYMBALTA) 60 MG capsule, Take 1 capsule by mouth Daily., Disp: 30 capsule, Rfl: 5  •  famotidine (PEPCID) 40 MG tablet, Take 1 tablet by mouth 2 (Two) Times a Day., Disp: 180 tablet, Rfl: 0  •  fluticasone (FLONASE) 50 MCG/ACT nasal spray, 2 sprays into the nostril(s) as directed by provider Daily., Disp: 16 g, Rfl: 0  •  gabapentin (NEURONTIN) 300 MG capsule, Take 1 capsule by mouth 2 (Two) Times a Day., Disp: 60 capsule, Rfl: 3  •  linaclotide (LINZESS) 145 MCG capsule capsule, Take 1 capsule by mouth Every Morning Before Breakfast., Disp: 90 capsule, Rfl: 3  •  pantoprazole (PROTONIX) 40 MG EC tablet, Take 1 tablet by mouth 2 (Two) Times a Day., Disp: 180 tablet, Rfl: 3  •  Pitavastatin Calcium (Livalo) 4 MG  "tablet, Take 1 tablet by mouth Every Night., Disp: 30 tablet, Rfl: 5  •  sucralfate (Carafate) 1 g tablet, Take 1 tablet by mouth every night at bedtime. If cant swallow, may cut or Crush tablet, 30 min after other meds, before bedtime., Disp: 90 tablet, Rfl: 3  •  Xiidra 5 % ophthalmic solution, , Disp: , Rfl:     ALLERGIES  Atorvastatin, Codeine, Crestor [rosuvastatin calcium], Simvastatin, and Sulfa antibiotics    VITALS  Vitals:    02/17/23 0915   BP: 132/80   BP Location: Left arm   Patient Position: Sitting   Cuff Size: Adult   Weight: 73 kg (161 lb)   Height: 162.6 cm (64.02\")       PHYSICAL EXAM  Debilities/Disabilities Identified: None  Emotional Behavior: Appropriate  Wt Readings from Last 3 Encounters:   02/17/23 73 kg (161 lb)   12/22/22 73.6 kg (162 lb 3.2 oz)   11/30/22 72.6 kg (160 lb)     Ht Readings from Last 1 Encounters:   02/17/23 162.6 cm (64.02\")     Body mass index is 27.62 kg/m².  Physical Exam  Constitutional:       General: She is not in acute distress.     Appearance: Normal appearance. She is not ill-appearing.   HENT:      Head: Normocephalic and atraumatic.      Mouth/Throat:      Mouth: Mucous membranes are moist.      Pharynx: No posterior oropharyngeal erythema.   Eyes:      General: No scleral icterus.  Cardiovascular:      Rate and Rhythm: Normal rate and regular rhythm.      Heart sounds: Normal heart sounds.   Pulmonary:      Effort: Pulmonary effort is normal.      Breath sounds: Normal breath sounds.   Abdominal:      General: Abdomen is flat. Bowel sounds are normal. There is no distension.      Palpations: Abdomen is soft. There is no mass.      Tenderness: There is no abdominal tenderness. There is no guarding or rebound. Negative signs include Franklin's sign.      Hernia: No hernia is present.   Musculoskeletal:      Cervical back: Neck supple.   Skin:     General: Skin is warm.      Capillary Refill: Capillary refill takes less than 2 seconds.   Neurological:      General: " No focal deficit present.      Mental Status: She is alert and oriented to person, place, and time.   Psychiatric:         Mood and Affect: Mood normal.         Behavior: Behavior normal.         Thought Content: Thought content normal.         Judgment: Judgment normal.         CLINICAL DATA REVIEWED   reviewed previous lab results and integrated with today's visit, reviewed notes from other physicians and/or last GI encounter, reviewed previous endoscopy results and available photos, reviewed surgical pathology results from previous biopsies    ASSESSMENT  Diagnoses and all orders for this visit:    Gastroesophageal reflux disease with esophagitis without hemorrhage    Irritable bowel syndrome with constipation          PLAN    Continue current management  Start daily probiotic, drink plenty water    Return in about 6 months (around 8/17/2023).    I have discussed the above plan with the patient.  They verbalize understanding and are in agreement with the plan.  They have been advised to contact the office for any questions, concerns, or changes related to their health.

## 2023-04-11 ENCOUNTER — OFFICE VISIT (OUTPATIENT)
Dept: FAMILY MEDICINE CLINIC | Facility: CLINIC | Age: 63
End: 2023-04-11
Payer: COMMERCIAL

## 2023-04-11 VITALS
HEIGHT: 64 IN | BODY MASS INDEX: 27.04 KG/M2 | RESPIRATION RATE: 18 BRPM | SYSTOLIC BLOOD PRESSURE: 120 MMHG | OXYGEN SATURATION: 97 % | DIASTOLIC BLOOD PRESSURE: 82 MMHG | TEMPERATURE: 98 F | HEART RATE: 76 BPM | WEIGHT: 158.4 LBS

## 2023-04-11 DIAGNOSIS — J01.40 ACUTE NON-RECURRENT PANSINUSITIS: Primary | ICD-10-CM

## 2023-04-11 PROCEDURE — 99213 OFFICE O/P EST LOW 20 MIN: CPT | Performed by: STUDENT IN AN ORGANIZED HEALTH CARE EDUCATION/TRAINING PROGRAM

## 2023-04-11 RX ORDER — PREDNISONE 20 MG/1
60 TABLET ORAL DAILY
Qty: 12 TABLET | Refills: 0 | Status: SHIPPED | OUTPATIENT
Start: 2023-04-11 | End: 2023-04-15

## 2023-04-11 RX ORDER — AZITHROMYCIN 250 MG/1
TABLET, FILM COATED ORAL
Qty: 6 TABLET | Refills: 0 | OUTPATIENT
Start: 2023-04-11 | End: 2023-04-18

## 2023-04-11 NOTE — PROGRESS NOTES
Pk Sanchez DO  [facility] PRIMARY [facility]ARE  [facility]  [place] [id number] [phone]    Subjective      Name Esther Duke MRN [id number]    1960 AGE/SEX 62 y.o. / female      Chief Complaint Chief Complaint   Patient presents with   • Sinus Problem     Congestion...possible sinus infection         Visit History for  2023    History of Present Illness  Esther Duke is a 62 y.o. female who presented today for Sinus Problem (Congestion...possible sinus infection)  Has been having problems since having COVID back in November, but has been progressively getting worse in the last month or so.  Does not seem to be getting any better.        Medications and Allergies   Current Outpatient Medications   Medication Instructions   • azithromycin (Zithromax Z-Marcel) 250 MG tablet Take 2 tablets by mouth on day 1, then 1 tablet daily on days 2-5   • DULoxetine (CYMBALTA) 30 mg, Oral, Daily   • DULoxetine (CYMBALTA) 60 mg, Oral, Daily   • famotidine (PEPCID) 40 mg, Oral, 2 Times Daily   • fluticasone (FLONASE) 50 MCG/ACT nasal spray 2 sprays, Nasal, Daily   • gabapentin (NEURONTIN) 300 mg, Oral, 2 Times Daily   • linaclotide (LINZESS) 145 mcg, Oral, Every Morning Before Breakfast   • Livalo 4 mg, Oral, Nightly   • pantoprazole (PROTONIX) 40 mg, Oral, 2 Times Daily   • predniSONE (DELTASONE) 60 mg, Oral, Daily   • sucralfate (CARAFATE) 1 g, Oral, Every Night at Bedtime, If cant swallow, may cut or Crush tablet, 30 min after other meds, before bedtime.   • Xiidra 5 % ophthalmic solution No dose, route, or frequency recorded.     Allergies   Allergen Reactions   • Atorvastatin Myalgia   • Codeine GI Intolerance   • Crestor [Rosuvastatin Calcium] Myalgia   • Simvastatin Myalgia   • Sulfa Antibiotics Hives      I have reviewed the above medications and allergies     Objective:      Vitals Vitals:    23 1114   BP: 120/82   BP Location: Left arm   Patient Position: Sitting  "  Cuff Size: Adult   Pulse: 76   Resp: 18   Temp: 98 °F (36.7 °C)   TempSrc: Oral   SpO2: 97%   Weight: 71.8 kg (158 lb 6.4 oz)   Height: 162.6 cm (64.02\")   PainSc: 0-No pain     Body mass index is 27.18 kg/m².    Physical Exam  Vitals reviewed.   Constitutional:       General: She is not in acute distress.     Appearance: She is not ill-appearing.   HENT:      Nose: Congestion and rhinorrhea present.      Mouth/Throat:      Pharynx: Posterior oropharyngeal erythema present.   Cardiovascular:      Rate and Rhythm: Normal rate and regular rhythm.   Pulmonary:      Effort: Pulmonary effort is normal.      Breath sounds: Normal breath sounds.   Neurological:      Mental Status: She is alert.   Psychiatric:         Mood and Affect: Mood normal.         Behavior: Behavior normal.         Thought Content: Thought content normal.         Judgment: Judgment normal.            Assessment/Plan      Issues Addressed/ Plan  1. Acute non-recurrent pansinusitis  - Patient appears to be having pansinusitis and is going to require treatment.  - azithromycin (Zithromax Z-Marcel) 250 MG tablet; Take 2 tablets by mouth on day 1, then 1 tablet daily on days 2-5  Dispense: 6 tablet; Refill: 0  - predniSONE (DELTASONE) 20 MG tablet; Take 3 tablets by mouth Daily for 4 days.  Dispense: 12 tablet; Refill: 0     There are no Patient Instructions on file for this visit.      Follow up  recommended Return if symptoms worsen or fail to improve.   - Dragon voice recognition software was utilized to complete this chart.  Every reasonable attempt was made to edit and correct the text, however some incorrect words may remain.   Pk Sanchez DO         "

## 2023-05-13 DIAGNOSIS — K58.1 IRRITABLE BOWEL SYNDROME WITH CONSTIPATION: ICD-10-CM

## 2023-05-13 DIAGNOSIS — K21.00 GASTROESOPHAGEAL REFLUX DISEASE WITH ESOPHAGITIS WITHOUT HEMORRHAGE: ICD-10-CM

## 2023-05-13 DIAGNOSIS — R10.13 DYSPEPSIA: ICD-10-CM

## 2023-05-15 RX ORDER — FAMOTIDINE 40 MG/1
TABLET, FILM COATED ORAL
Qty: 180 TABLET | Refills: 3 | Status: SHIPPED | OUTPATIENT
Start: 2023-05-15

## 2023-05-24 ENCOUNTER — TELEPHONE (OUTPATIENT)
Dept: GASTROENTEROLOGY | Facility: CLINIC | Age: 63
End: 2023-05-24
Payer: COMMERCIAL

## 2023-05-24 NOTE — TELEPHONE ENCOUNTER
Patient called to reschedule her colonoscopy on 06/14/2023.  Will be on vacation.  Need to stay at [facility].    Scheduled at [place] 09/15/2023 at 2:45pm - arrive 1:30pm.  Will mail new instructions.

## 2023-06-01 DIAGNOSIS — M79.7 FIBROMYALGIA: ICD-10-CM

## 2023-06-01 NOTE — TELEPHONE ENCOUNTER
Caller: DukeTuyetEsther M    Relationship: Self    Best call back number: [phone]    Requested Prescriptions:   Requested Prescriptions     Pending Prescriptions Disp Refills   • gabapentin (NEURONTIN) 300 MG capsule 60 capsule 3     Sig: Take 1 capsule by mouth 2 (Two) Times a Day.        Pharmacy where request should be sent: [facility] [id number] [place], [place] - [address] - [phone] PH - [phone] FX     Last office visit with prescribing clinician: 11/30/2022   Last telemedicine visit with prescribing clinician: Visit date not found   Next office visit with prescribing clinician: Visit date not found     Additional details provided by patient: PATIENT IS OUT OF THIS MEDICATION.     Does the patient have less than a 3 day supply:  [x] Yes  [] No    Would you like a call back once the refill request has been completed: [] Yes [x] No    If the office needs to give you a call back, can they leave a voicemail: [x] Yes [] No    Geneva Fong   06/01/23 13:29 EDT

## 2023-06-02 RX ORDER — GABAPENTIN 300 MG/1
300 CAPSULE ORAL 2 TIMES DAILY
Qty: 60 CAPSULE | Refills: 3 | Status: SHIPPED | OUTPATIENT
Start: 2023-06-02

## 2023-06-05 ENCOUNTER — TELEPHONE (OUTPATIENT)
Dept: FAMILY MEDICINE CLINIC | Facility: CLINIC | Age: 63
End: 2023-06-05
Payer: COMMERCIAL

## 2023-06-05 NOTE — TELEPHONE ENCOUNTER
Called patient to find out if wants 90 or 30 # of pills on duloxetine 30 and also does she need duloxetine 60 mg or just the 30 mg

## 2023-06-06 RX ORDER — DULOXETIN HYDROCHLORIDE 60 MG/1
60 CAPSULE, DELAYED RELEASE ORAL DAILY
Qty: 90 CAPSULE | Refills: 0 | Status: SHIPPED | OUTPATIENT
Start: 2023-06-06 | End: 2023-06-06 | Stop reason: SDUPTHER

## 2023-06-06 RX ORDER — DULOXETIN HYDROCHLORIDE 60 MG/1
60 CAPSULE, DELAYED RELEASE ORAL DAILY
Qty: 90 CAPSULE | Refills: 3 | Status: SHIPPED | OUTPATIENT
Start: 2023-06-06

## 2023-06-06 RX ORDER — DULOXETIN HYDROCHLORIDE 30 MG/1
30 CAPSULE, DELAYED RELEASE ORAL DAILY
Qty: 90 CAPSULE | Refills: 3 | Status: SHIPPED | OUTPATIENT
Start: 2023-06-06

## 2023-06-06 RX ORDER — DULOXETIN HYDROCHLORIDE 30 MG/1
30 CAPSULE, DELAYED RELEASE ORAL DAILY
Qty: 90 CAPSULE | Refills: 0 | Status: SHIPPED | OUTPATIENT
Start: 2023-06-06 | End: 2023-06-06 | Stop reason: SDUPTHER

## 2023-06-14 ENCOUNTER — TELEPHONE (OUTPATIENT)
Dept: FAMILY MEDICINE CLINIC | Facility: CLINIC | Age: 63
End: 2023-06-14

## 2023-06-14 ENCOUNTER — OFFICE VISIT (OUTPATIENT)
Dept: FAMILY MEDICINE CLINIC | Facility: CLINIC | Age: 63
End: 2023-06-14
Payer: COMMERCIAL

## 2023-06-14 VITALS
BODY MASS INDEX: 27.31 KG/M2 | DIASTOLIC BLOOD PRESSURE: 76 MMHG | OXYGEN SATURATION: 98 % | SYSTOLIC BLOOD PRESSURE: 138 MMHG | TEMPERATURE: 97.7 F | RESPIRATION RATE: 18 BRPM | HEART RATE: 76 BPM | HEIGHT: 64 IN | WEIGHT: 160 LBS

## 2023-06-14 DIAGNOSIS — J01.90 ACUTE NON-RECURRENT SINUSITIS, UNSPECIFIED LOCATION: ICD-10-CM

## 2023-06-14 DIAGNOSIS — J40 BRONCHITIS: ICD-10-CM

## 2023-06-14 DIAGNOSIS — F32.9 REACTIVE DEPRESSION: ICD-10-CM

## 2023-06-14 DIAGNOSIS — E78.49 OTHER HYPERLIPIDEMIA: Primary | ICD-10-CM

## 2023-06-14 PROCEDURE — 99213 OFFICE O/P EST LOW 20 MIN: CPT | Performed by: INTERNAL MEDICINE

## 2023-06-14 RX ORDER — AMOXICILLIN AND CLAVULANATE POTASSIUM 562.5; 437.5; 62.5 MG/1; MG/1; MG/1
2 TABLET, MULTILAYER, EXTENDED RELEASE ORAL 2 TIMES DAILY
Qty: 20 TABLET | Refills: 0 | Status: SHIPPED | OUTPATIENT
Start: 2023-06-14 | End: 2023-06-15 | Stop reason: ALTCHOICE

## 2023-06-14 RX ORDER — ALBUTEROL SULFATE 90 UG/1
AEROSOL, METERED RESPIRATORY (INHALATION)
COMMUNITY
Start: 2023-06-06

## 2023-06-14 RX ORDER — METHYLPREDNISOLONE 4 MG/1
TABLET ORAL
Qty: 1 EACH | Refills: 0 | Status: SHIPPED | OUTPATIENT
Start: 2023-06-14

## 2023-06-14 RX ORDER — DEXTROMETHORPHAN HYDROBROMIDE AND PROMETHAZINE HYDROCHLORIDE 15; 6.25 MG/5ML; MG/5ML
5 SYRUP ORAL NIGHTLY PRN
Qty: 200 ML | Refills: 0 | Status: SHIPPED | OUTPATIENT
Start: 2023-06-14

## 2023-06-14 NOTE — PROGRESS NOTES
Subjective   Esther Duke is a 63 y.o. female.     Chief Complaint   Patient presents with   • cough  thick clear   • Headache   • Sinus Problem       History of Present Illness   She reports she was seen at urgent care 2 weeks ago.  She was prescribed Tessalon, doxycycline.  She completed without any relief.  Reports having sinus headaches, feels congested, cough continues.  No fever or chills.  Patient reports she was tested for COVID this was negative.  The following portions of the patient's history were reviewed and updated as appropriate: allergies, current medications, past family history, past medical history, past social history, past surgical history and problem list.    Review of Systems   Constitutional: Negative for activity change, appetite change, fatigue and fever.   HENT: Positive for facial swelling, postnasal drip, rhinorrhea and sinus pressure.    Eyes: Negative for blurred vision and double vision.   Respiratory: Positive for cough. Negative for apnea, choking, chest tightness, shortness of breath, wheezing and stridor.    Cardiovascular: Negative for chest pain, palpitations and leg swelling.   Neurological: Negative for dizziness, syncope, light-headedness and headache.       Allergies   Allergen Reactions   • Atorvastatin Myalgia   • Codeine GI Intolerance   • Crestor [Rosuvastatin Calcium] Myalgia   • Simvastatin Myalgia   • Sulfa Antibiotics Hives       Current Outpatient Medications on File Prior to Visit   Medication Sig Dispense Refill   • albuterol sulfate  (90 Base) MCG/ACT inhaler      • DULoxetine (CYMBALTA) 30 MG capsule Take 1 capsule by mouth Daily. 90 capsule 3   • DULoxetine (CYMBALTA) 60 MG capsule Take 1 capsule by mouth Daily. 90 capsule 3   • famotidine (PEPCID) 40 MG tablet TAKE ONE TABLET BY MOUTH TWICE A  tablet 3   • fluticasone (FLONASE) 50 MCG/ACT nasal spray 2 sprays into the nostril(s) as directed by provider Daily. 16 g 0   • gabapentin (NEURONTIN)  300 MG capsule Take 1 capsule by mouth 2 (Two) Times a Day. 60 capsule 3   • linaclotide (LINZESS) 145 MCG capsule capsule Take 1 capsule by mouth Every Morning Before Breakfast. 90 capsule 3   • pantoprazole (PROTONIX) 40 MG EC tablet Take 1 tablet by mouth 2 (Two) Times a Day. 180 tablet 3   • Pitavastatin Calcium (Livalo) 4 MG tablet Take 1 tablet by mouth Every Night. 30 tablet 5   • sucralfate (Carafate) 1 g tablet Take 1 tablet by mouth every night at bedtime. If cant swallow, may cut or Crush tablet, 30 min after other meds, before bedtime. 90 tablet 3   • Xiidra 5 % ophthalmic solution      • benzonatate (TESSALON) 200 MG capsule Take 1 capsule by mouth 3 (Three) Times a Day As Needed for Cough. (Patient not taking: Reported on 6/14/2023) 30 capsule 0   • doxycycline (MONODOX) 100 MG capsule Take 1 capsule by mouth 2 (Two) Times a Day. (Patient not taking: Reported on 6/14/2023) 14 capsule 0     No current facility-administered medications on file prior to visit.       Family History   Problem Relation Age of Onset   • Hypertension Mother    • Hyperlipidemia Mother    • Hypertension Father    • Hyperlipidemia Father    • Stroke Father    • Cancer Sister    • Aneurysm Maternal Uncle    • Heart disease Maternal Grandmother    • Colon cancer Neg Hx    • Colon polyps Neg Hx        Past Medical History:   Diagnosis Date   • Fibromyalgia    • Gastroesophageal reflux disease without esophagitis    • Hernia, hiatal    • High cholesterol    • IBS (irritable bowel syndrome)    • Other hyperlipidemia    • Reactive depression        Past Surgical History:   Procedure Laterality Date   • COLONOSCOPY         Social History     Socioeconomic History   • Marital status:    Tobacco Use   • Smoking status: Never     Passive exposure: Never   • Smokeless tobacco: Never   • Tobacco comments:     caffeine use   Vaping Use   • Vaping Use: Never used   Substance and Sexual Activity   • Alcohol use: No   • Drug use: No   •  "Sexual activity: Defer       Patient Active Problem List   Diagnosis   • Fibromyalgia   • Other hyperlipidemia   • Reactive depression   • Chronic foot pain, right   • Irritable bowel syndrome with constipation   • Gastroesophageal reflux disease with esophagitis without hemorrhage   • Screen for colon cancer       /76   Pulse 76   Temp 97.7 °F (36.5 °C)   Resp 18   Ht 162.6 cm (64\")   Wt 72.6 kg (160 lb)   SpO2 98%   BMI 27.46 kg/m²   Body mass index is 27.46 kg/m².    Objective   Physical Exam  Vitals and nursing note reviewed.   Constitutional:       Appearance: She is well-developed.   HENT:      Nose: Nose normal.      Mouth/Throat:      Mouth: Mucous membranes are moist.      Pharynx: Posterior oropharyngeal erythema present. No oropharyngeal exudate.   Eyes:      Extraocular Movements: Extraocular movements intact.      Pupils: Pupils are equal, round, and reactive to light.   Neck:      Thyroid: No thyromegaly.      Vascular: No JVD.      Trachea: No tracheal deviation.   Cardiovascular:      Rate and Rhythm: Normal rate and regular rhythm.      Heart sounds: No murmur heard.  Pulmonary:      Effort: Pulmonary effort is normal. No respiratory distress.      Breath sounds: Normal breath sounds. No wheezing.   Musculoskeletal:      Cervical back: Normal range of motion and neck supple.   Lymphadenopathy:      Cervical: No cervical adenopathy.   Neurological:      Mental Status: She is alert and oriented to person, place, and time.           Assessment & Plan   Diagnoses and all orders for this visit:    1. Other hyperlipidemia (Primary)  -     CBC & Differential; Future  -     Comprehensive Metabolic Panel; Future  -     CK; Future  -     Lipid Panel With / Chol / HDL Ratio; Future  -     TSH; Future    2. Reactive depression  -     CBC & Differential; Future  -     Comprehensive Metabolic Panel; Future  -     CK; Future  -     Lipid Panel With / Chol / HDL Ratio; Future  -     TSH; Future    3. " Acute non-recurrent sinusitis, unspecified location    4. Bronchitis    Other orders  -     amoxicillin-clavulanate XR (AUGMENTIN XR) 1000-62.5 MG per 12 hr tablet; Take 2 tablets by mouth 2 (Two) Times a Day.  Dispense: 20 tablet; Refill: 0  -     methylPREDNISolone (MEDROL) 4 MG dose pack; Take as directed on package instructions.  Dispense: 1 each; Refill: 0  -     promethazine-dextromethorphan (PROMETHAZINE-DM) 6.25-15 MG/5ML syrup; Take 5 mL by mouth At Night As Needed for Cough.  Dispense: 200 mL; Refill: 0    Augmentin and Medrol Dosepak were given.  Continue Claritin, Flonase.  Phenergan DM at night.  Take over-the-counter probiotics while on Augmentin.  Patient to get labs done at the hospital.  She needs to come back within 2 weeks time for routine checkup.  Patient has not been seen for routine follow-up for several months.  EHR dragon/transcription disclaimer:  Part of this note are created by electronic transcription/translation of spoken language to printed text and thus may lead to erroneous, or at times, nonsensical words or phrases inadvertently transcribed.  Although I have reviewed for such errors, some may still exist.

## 2023-06-14 NOTE — TELEPHONE ENCOUNTER
Pharmacy Name:  [facility] #[id number] - [place], [place] - [address] AT [facility] & [address] - [phone]  - [phone] FX (Pharmacy)     Pharmacy representative name: LUKE    Pharmacy representative phone number: [phone]     What medication are you calling in regards to: amoxicillin-clavulanate XR (AUGMENTIN XR) 1000-62.5 MG per 12 hr tablet     What question does the pharmacy have: PRESCRIPTION WAS WRITTEN FOR 2 TABLETS TWICE A DAY- IS A POSSIBLY HIGH DOSE FOR THE PATIENT AND PHARMACY NEEDS CLARIFICATION ON PATIENT'S WEIGHT AND REASON FOR USE TO MAKE SURE THIS IS NOT TOO HIGH, PLEASE CALL PHARMACY BACK REGARDING THIS DOSE ASAP    Who is the provider that prescribed the medication: DR VALIENTE    Additional notes: PLEASE ADVISE PHARMACY REGARDING PATIENT'S DOSE OF THIS MEDICATION ASAP

## 2023-06-15 ENCOUNTER — LAB (OUTPATIENT)
Dept: LAB | Facility: HOSPITAL | Age: 63
End: 2023-06-15
Payer: COMMERCIAL

## 2023-06-15 DIAGNOSIS — E78.49 OTHER HYPERLIPIDEMIA: ICD-10-CM

## 2023-06-15 DIAGNOSIS — F32.9 REACTIVE DEPRESSION: ICD-10-CM

## 2023-06-15 LAB
ALBUMIN SERPL-MCNC: 4.2 G/DL (ref 3.5–5.2)
ALBUMIN/GLOB SERPL: 1.6 G/DL
ALP SERPL-CCNC: 83 U/L (ref 39–117)
ALT SERPL W P-5'-P-CCNC: 14 U/L (ref 1–33)
ANION GAP SERPL CALCULATED.3IONS-SCNC: 8 MMOL/L (ref 5–15)
AST SERPL-CCNC: 15 U/L (ref 1–32)
BASOPHILS # BLD AUTO: 0.07 10*3/MM3 (ref 0–0.2)
BASOPHILS NFR BLD AUTO: 1.3 % (ref 0–1.5)
BILIRUB SERPL-MCNC: 0.2 MG/DL (ref 0–1.2)
BUN SERPL-MCNC: 7 MG/DL (ref 8–23)
BUN/CREAT SERPL: 10 (ref 7–25)
CALCIUM SPEC-SCNC: 9.2 MG/DL (ref 8.6–10.5)
CHLORIDE SERPL-SCNC: 103 MMOL/L (ref 98–107)
CHOLEST SERPL-MCNC: 203 MG/DL (ref 0–200)
CK SERPL-CCNC: 57 U/L (ref 20–180)
CO2 SERPL-SCNC: 29 MMOL/L (ref 22–29)
CREAT SERPL-MCNC: 0.7 MG/DL (ref 0.57–1)
DEPRECATED RDW RBC AUTO: 41.6 FL (ref 37–54)
EGFRCR SERPLBLD CKD-EPI 2021: 97.9 ML/MIN/1.73
EOSINOPHIL # BLD AUTO: 0.61 10*3/MM3 (ref 0–0.4)
EOSINOPHIL NFR BLD AUTO: 11.5 % (ref 0.3–6.2)
ERYTHROCYTE [DISTWIDTH] IN BLOOD BY AUTOMATED COUNT: 13.3 % (ref 12.3–15.4)
GLOBULIN UR ELPH-MCNC: 2.6 GM/DL
GLUCOSE SERPL-MCNC: 95 MG/DL (ref 65–99)
HCT VFR BLD AUTO: 40.7 % (ref 34–46.6)
HDLC SERPL QL: 2.57
HDLC SERPL-MCNC: 79 MG/DL (ref 40–60)
HGB BLD-MCNC: 13.4 G/DL (ref 12–15.9)
IMM GRANULOCYTES # BLD AUTO: 0.01 10*3/MM3 (ref 0–0.05)
IMM GRANULOCYTES NFR BLD AUTO: 0.2 % (ref 0–0.5)
LDLC SERPL CALC-MCNC: 107 MG/DL (ref 0–100)
LYMPHOCYTES # BLD AUTO: 1.79 10*3/MM3 (ref 0.7–3.1)
LYMPHOCYTES NFR BLD AUTO: 33.8 % (ref 19.6–45.3)
MCH RBC QN AUTO: 28.1 PG (ref 26.6–33)
MCHC RBC AUTO-ENTMCNC: 32.9 G/DL (ref 31.5–35.7)
MCV RBC AUTO: 85.3 FL (ref 79–97)
MONOCYTES # BLD AUTO: 0.51 10*3/MM3 (ref 0.1–0.9)
MONOCYTES NFR BLD AUTO: 9.6 % (ref 5–12)
NEUTROPHILS NFR BLD AUTO: 2.31 10*3/MM3 (ref 1.7–7)
NEUTROPHILS NFR BLD AUTO: 43.6 % (ref 42.7–76)
NRBC BLD AUTO-RTO: 0 /100 WBC (ref 0–0.2)
PLATELET # BLD AUTO: 367 10*3/MM3 (ref 140–450)
PMV BLD AUTO: 10.1 FL (ref 6–12)
POTASSIUM SERPL-SCNC: 4 MMOL/L (ref 3.5–5.2)
PROT SERPL-MCNC: 6.8 G/DL (ref 6–8.5)
RBC # BLD AUTO: 4.77 10*6/MM3 (ref 3.77–5.28)
SODIUM SERPL-SCNC: 140 MMOL/L (ref 136–145)
TRIGL SERPL-MCNC: 96 MG/DL (ref 0–150)
TSH SERPL DL<=0.05 MIU/L-ACNC: 2.4 UIU/ML (ref 0.27–4.2)
VLDLC SERPL-MCNC: 17 MG/DL (ref 5–40)
WBC NRBC COR # BLD: 5.3 10*3/MM3 (ref 3.4–10.8)

## 2023-06-15 PROCEDURE — 82550 ASSAY OF CK (CPK): CPT

## 2023-06-15 PROCEDURE — 80050 GENERAL HEALTH PANEL: CPT

## 2023-06-15 PROCEDURE — 80061 LIPID PANEL: CPT

## 2023-06-15 RX ORDER — AMOXICILLIN AND CLAVULANATE POTASSIUM 875; 125 MG/1; MG/1
1 TABLET, FILM COATED ORAL 2 TIMES DAILY
Qty: 20 TABLET | Refills: 0 | Status: SHIPPED | OUTPATIENT
Start: 2023-06-15

## 2023-06-28 ENCOUNTER — TELEPHONE (OUTPATIENT)
Dept: FAMILY MEDICINE CLINIC | Facility: CLINIC | Age: 63
End: 2023-06-28

## 2023-06-28 NOTE — TELEPHONE ENCOUNTER
Caller: Esther Duke    Relationship: Self    Best call back number:     [phone] (Home)   [phone] WORK     Caller requesting test results: Esther Duke    What test was performed: CHEST X-RAY    When was the test performed: 06/27/2023    Where was the test performed: [facility]    Additional notes: PATIENT STATES SHE DOES NOT USE MYCHART AND WOULD LIKE A CALL BACK TO BE GIVEN HER RESULTS AND TO DISCUSS THEM ASAP

## 2023-08-03 RX ORDER — BUDESONIDE AND FORMOTEROL FUMARATE DIHYDRATE 80; 4.5 UG/1; UG/1
2 AEROSOL RESPIRATORY (INHALATION)
Qty: 1 EACH | Refills: 0 | Status: SHIPPED | OUTPATIENT
Start: 2023-08-03

## 2023-08-07 ENCOUNTER — TELEPHONE (OUTPATIENT)
Dept: FAMILY MEDICINE CLINIC | Facility: CLINIC | Age: 63
End: 2023-08-07
Payer: COMMERCIAL

## 2023-08-07 RX ORDER — PITAVASTATIN CALCIUM 4.18 MG/1
4 TABLET, FILM COATED ORAL NIGHTLY
Qty: 30 TABLET | Refills: 2 | Status: SHIPPED | OUTPATIENT
Start: 2023-08-07

## 2023-08-18 ENCOUNTER — TELEPHONE (OUTPATIENT)
Dept: FAMILY MEDICINE CLINIC | Facility: CLINIC | Age: 63
End: 2023-08-18

## 2023-08-21 RX ORDER — FLUCONAZOLE 150 MG/1
150 TABLET ORAL ONCE
Qty: 1 TABLET | Refills: 0 | Status: SHIPPED | OUTPATIENT
Start: 2023-08-21 | End: 2023-08-21

## 2023-09-06 ENCOUNTER — TRANSCRIBE ORDERS (OUTPATIENT)
Dept: ADMINISTRATIVE | Facility: HOSPITAL | Age: 63
End: 2023-09-06
Payer: COMMERCIAL

## 2023-09-06 DIAGNOSIS — R05.3 CHRONIC COUGH: Primary | ICD-10-CM

## 2023-09-07 ENCOUNTER — TRANSCRIBE ORDERS (OUTPATIENT)
Dept: ADMINISTRATIVE | Facility: HOSPITAL | Age: 63
End: 2023-09-07
Payer: COMMERCIAL

## 2023-09-07 DIAGNOSIS — J32.9 CHRONIC SINUSITIS, UNSPECIFIED LOCATION: Primary | ICD-10-CM

## 2023-09-08 RX ORDER — DULOXETIN HYDROCHLORIDE 30 MG/1
30 CAPSULE, DELAYED RELEASE ORAL DAILY
Qty: 90 CAPSULE | Refills: 3 | Status: SHIPPED | OUTPATIENT
Start: 2023-09-08

## 2023-09-08 NOTE — TELEPHONE ENCOUNTER
Caller: DukeEsther    Relationship: Self    Best call back number: [phone]    Requested Prescriptions:   Requested Prescriptions     Pending Prescriptions Disp Refills    DULoxetine (CYMBALTA) 30 MG capsule 90 capsule 3     Sig: Take 1 capsule by mouth Daily.        Pharmacy where request should be sent: [id number] [place] [facility] [address] - [phone] PH - [phone] FX     Last office visit with prescribing clinician: 6/27/2023   Last telemedicine visit with prescribing clinician: Visit date not found   Next office visit with prescribing clinician: 9/19/2023     Additional details provided by patient: PATIENT STATED THEY ARE OUT OF THE MEDICATION    Does the patient have less than a 3 day supply:  [x] Yes  [] No    Would you like a call back once the refill request has been completed: [] Yes [x] No    If the office needs to give you a call back, can they leave a voicemail: [] Yes [x] No    Geneva Givens   09/08/23 08:42 EDT

## 2023-09-12 ENCOUNTER — HOSPITAL ENCOUNTER (OUTPATIENT)
Dept: CT IMAGING | Facility: HOSPITAL | Age: 63
Discharge: HOME | End: 2023-09-12
Admitting: OTOLARYNGOLOGY
Payer: COMMERCIAL

## 2023-09-12 DIAGNOSIS — R05.3 CHRONIC COUGH: ICD-10-CM

## 2023-09-12 DIAGNOSIS — J32.9 CHRONIC SINUSITIS, UNSPECIFIED LOCATION: ICD-10-CM

## 2023-09-12 LAB — CREAT BLDA-MCNC: 0.7 MG/DL (ref 0.6–1.3)

## 2023-09-12 PROCEDURE — 25510000001 IOPAMIDOL 61 % SOLUTION: Performed by: OTOLARYNGOLOGY

## 2023-09-12 PROCEDURE — 82565 ASSAY OF CREATININE: CPT

## 2023-09-12 PROCEDURE — 71260 CT THORAX DX C+: CPT

## 2023-09-12 PROCEDURE — 70486 CT MAXILLOFACIAL W/O DYE: CPT

## 2023-09-12 RX ADMIN — IOPAMIDOL 85 ML: 612 INJECTION, SOLUTION INTRAVENOUS at 15:25

## 2023-09-13 NOTE — SIGNIFICANT NOTE
Education provided the Patient on the following:    - Nothing to Eat or Drink after MN the night before the procedure    - Avoid red/purple fluids while completing their bowel prep as ordered by physician  -Contact Gastrointerologist office for any questions about specific details regarding colon prep    -You will need to have someone drive you home after your colonoscopy and remain with you for 24 hours after the procedure  - The date of your Surgery, you may have one visitor at bedside or within 10-15 minutes of [facility]  -Please wear warm socks when you arrive for your colonoscopy  -Remove all jewelry and leave any valuables before arriving the day of your procedure (all will have to be removed before leaving preop)  -You will need to arrive at 1300 on 9/15/23 for your colonoscopy    -Feel free to contact us at: [phone] with any additional questions/concerns

## 2023-09-15 ENCOUNTER — ANESTHESIA (OUTPATIENT)
Dept: SURGERY | Facility: SURGERY CENTER | Age: 63
End: 2023-09-15
Payer: COMMERCIAL

## 2023-09-15 ENCOUNTER — HOSPITAL ENCOUNTER (OUTPATIENT)
Facility: SURGERY CENTER | Age: 63
Setting detail: HOSPITAL OUTPATIENT SURGERY
Discharge: HOME | End: 2023-09-15
Attending: INTERNAL MEDICINE | Admitting: INTERNAL MEDICINE
Payer: COMMERCIAL

## 2023-09-15 ENCOUNTER — ANESTHESIA EVENT (OUTPATIENT)
Dept: SURGERY | Facility: SURGERY CENTER | Age: 63
End: 2023-09-15
Payer: COMMERCIAL

## 2023-09-15 VITALS
DIASTOLIC BLOOD PRESSURE: 72 MMHG | OXYGEN SATURATION: 96 % | TEMPERATURE: 97.7 F | WEIGHT: 158.4 LBS | BODY MASS INDEX: 27.04 KG/M2 | RESPIRATION RATE: 16 BRPM | HEART RATE: 76 BPM | HEIGHT: 64 IN | SYSTOLIC BLOOD PRESSURE: 130 MMHG

## 2023-09-15 PROCEDURE — 25010000002 PROPOFOL 10 MG/ML EMULSION: Performed by: STUDENT IN AN ORGANIZED HEALTH CARE EDUCATION/TRAINING PROGRAM

## 2023-09-15 PROCEDURE — 0 LIDOCAINE 1 % SOLUTION: Performed by: INTERNAL MEDICINE

## 2023-09-15 PROCEDURE — 45378 DIAGNOSTIC COLONOSCOPY: CPT | Performed by: INTERNAL MEDICINE

## 2023-09-15 RX ORDER — SODIUM CHLORIDE, SODIUM LACTATE, POTASSIUM CHLORIDE, CALCIUM CHLORIDE 600; 310; 30; 20 MG/100ML; MG/100ML; MG/100ML; MG/100ML
1000 INJECTION, SOLUTION INTRAVENOUS CONTINUOUS
Status: DISCONTINUED | OUTPATIENT
Start: 2023-09-15 | End: 2023-09-15 | Stop reason: HOSPADM

## 2023-09-15 RX ORDER — SODIUM CHLORIDE 0.9 % (FLUSH) 0.9 %
10 SYRINGE (ML) INJECTION AS NEEDED
Status: DISCONTINUED | OUTPATIENT
Start: 2023-09-15 | End: 2023-09-15 | Stop reason: HOSPADM

## 2023-09-15 RX ORDER — LIDOCAINE HYDROCHLORIDE 10 MG/ML
0.5 INJECTION, SOLUTION INFILTRATION; PERINEURAL ONCE AS NEEDED
Status: COMPLETED | OUTPATIENT
Start: 2023-09-15 | End: 2023-09-15

## 2023-09-15 RX ORDER — LIDOCAINE HYDROCHLORIDE 20 MG/ML
INJECTION, SOLUTION INFILTRATION; PERINEURAL AS NEEDED
Status: DISCONTINUED | OUTPATIENT
Start: 2023-09-15 | End: 2023-09-15 | Stop reason: SURG

## 2023-09-15 RX ORDER — PROPOFOL 10 MG/ML
VIAL (ML) INTRAVENOUS AS NEEDED
Status: DISCONTINUED | OUTPATIENT
Start: 2023-09-15 | End: 2023-09-15 | Stop reason: SURG

## 2023-09-15 RX ADMIN — PROPOFOL 160 MCG/KG/MIN: 10 INJECTION, EMULSION INTRAVENOUS at 13:39

## 2023-09-15 RX ADMIN — LIDOCAINE HYDROCHLORIDE 50 MG: 20 INJECTION, SOLUTION INFILTRATION; PERINEURAL at 13:39

## 2023-09-15 RX ADMIN — PROPOFOL 120 MG: 10 INJECTION, EMULSION INTRAVENOUS at 13:39

## 2023-09-15 RX ADMIN — LIDOCAINE HYDROCHLORIDE 0.5 ML: 10 INJECTION, SOLUTION INFILTRATION; PERINEURAL at 12:34

## 2023-09-15 RX ADMIN — SODIUM CHLORIDE, POTASSIUM CHLORIDE, SODIUM LACTATE AND CALCIUM CHLORIDE 1000 ML: 600; 310; 30; 20 INJECTION, SOLUTION INTRAVENOUS at 12:34

## 2023-09-15 NOTE — H&P
Patient Care Team:  Avery Armstrong MD as PCP - General (Internal Medicine)    CHIEF COMPLAINT: Screening CRC    HISTORY OF PRESENT ILLNESS:  No previous Polyps nor family history    Past Medical History:   Diagnosis Date    Fibromyalgia     Gastroesophageal reflux disease without esophagitis     Hernia, hiatal     High cholesterol     IBS (irritable bowel syndrome)     Other hyperlipidemia     Reactive depression      Past Surgical History:   Procedure Laterality Date    COLONOSCOPY      LAPAROSCOPIC CHOLECYSTECTOMY      TONSILLECTOMY       Family History   Problem Relation Age of Onset    Hypertension Mother     Hyperlipidemia Mother     Hypertension Father     Hyperlipidemia Father     Stroke Father     Cancer Sister     Aneurysm Maternal Uncle     Heart disease Maternal Grandmother     Colon cancer Neg Hx     Colon polyps Neg Hx      Social History     Tobacco Use    Smoking status: Never     Passive exposure: Never    Smokeless tobacco: Never    Tobacco comments:     caffeine use   Vaping Use    Vaping Use: Never used   Substance Use Topics    Alcohol use: No    Drug use: No     Medications Prior to Admission   Medication Sig Dispense Refill Last Dose    DULoxetine (CYMBALTA) 30 MG capsule Take 1 capsule by mouth Daily. 90 capsule 3 9/15/2023 at 0600    DULoxetine (CYMBALTA) 60 MG capsule Take 1 capsule by mouth Daily. 90 capsule 3 9/15/2023 at 0600    fluticasone (FLONASE) 50 MCG/ACT nasal spray 2 sprays into the nostril(s) as directed by provider Daily. 16 g 0 9/15/2023 at 0800    gabapentin (NEURONTIN) 300 MG capsule Take 1 capsule by mouth 2 (Two) Times a Day. 60 capsule 3 9/14/2023 at 2000    linaclotide (LINZESS) 145 MCG capsule capsule Take 1 capsule by mouth Every Morning Before Breakfast. 90 capsule 3 9/14/2023 at 0900    Pitavastatin Calcium (Livalo) 4 MG tablet Take 1 tablet by mouth Every Night. 30 tablet 2 9/14/2023 at 2000    Xiidra 5 % ophthalmic solution    9/15/2023 at 0800    albuterol  "sulfate  (90 Base) MCG/ACT inhaler    9/13/2023    albuterol sulfate  (90 Base) MCG/ACT inhaler Inhale 2 puffs 4 (Four) Times a Day. 18 g 0 9/13/2023    budesonide-formoterol (Symbicort) 80-4.5 MCG/ACT inhaler Inhale 2 puffs 2 (Two) Times a Day. 1 each 0 More than a month    famotidine (PEPCID) 40 MG tablet TAKE ONE TABLET BY MOUTH TWICE A  tablet 3 9/13/2023    pantoprazole (PROTONIX) 40 MG EC tablet Take 1 tablet by mouth 2 (Two) Times a Day. 180 tablet 3 9/13/2023    sucralfate (Carafate) 1 g tablet Take 1 tablet by mouth every night at bedtime. If cant swallow, may cut or Crush tablet, 30 min after other meds, before bedtime. 90 tablet 3 9/13/2023     Allergies:  Atorvastatin, Codeine, Crestor [rosuvastatin calcium], Simvastatin, and Sulfa antibiotics    REVIEW OF SYSTEMS:  Please see the above history of present illness for pertinent positives and negatives.  The remainder of the patient's systems have been reviewed and are negative.     Vital Signs  Temp:  [97.6 °F (36.4 °C)] 97.6 °F (36.4 °C)  Heart Rate:  [74] 74  Resp:  [18] 18  BP: (139)/(75) 139/75    Flowsheet Rows      Flowsheet Row First Filed Value   Admission Height 162.6 cm (64\") Documented at 09/13/2023 1016   Admission Weight 72.6 kg (160 lb) Documented at 09/13/2023 1016             Physical Exam:  Physical Exam   Constitutional: Patient appears well-developed and well-nourished and in no acute distress   HEENT:   Head: Normocephalic and atraumatic.   Eyes:  Pupils are equal, round, and reactive to light. EOM are intact. Sclerae are anicteric and non-injected.  Mouth and Throat: Patient has moist mucous membranes. Oropharynx is clear of any erythema or exudate.     Neck: Neck supple. No JVD present. No thyromegaly present. No lymphadenopathy present.  Cardiovascular: Regular rate, regular rhythm, S1 normal and S2 normal.  Exam reveals no gallop and no friction rub.  No murmur heard.  Pulmonary/Chest: Lungs are clear to " auscultation bilaterally. No respiratory distress. No wheezes. No rhonchi. No rales.   Abdominal: Soft. Bowel sounds are normal. No distension and no mass. There is no hepatosplenomegaly. There is no tenderness.   Musculoskeletal: Normal Muscle tone  Extremities: No edema. Pulses are palpable in all 4 extremities.  Neurological: Patient is alert and oriented to person, place, and time. Cranial nerves II-XII are grossly intact with no focal deficits.  Skin: Skin is warm. No rash noted. Nails show no clubbing.  No cyanosis or erythema.    Debilities/Disabilities Identified: None  Emotional Behavior: Appropriate     Results Review:   I reviewed the patient's new clinical results.    Lab Results (most recent)       None            Imaging Results (Most Recent)       None          reviewed    ECG/EMG Results (most recent)       None          reviewed    Assessment & Plan   Screening CRC/  colonoscopy      I discussed the patient's findings and my recommendations with patient.     Joe Del Cid MD  09/15/23  13:04 EDT    Time: 10 min prior to procedure.

## 2023-09-15 NOTE — ANESTHESIA PREPROCEDURE EVALUATION
Anesthesia Evaluation     Patient summary reviewed and Nursing notes reviewed                Airway   Mallampati: II  TM distance: >3 FB  Neck ROM: full  Dental - normal exam     Pulmonary - negative pulmonary ROS   Cardiovascular     (+) hyperlipidemia      Neuro/Psych  (+) psychiatric history Depression  GI/Hepatic/Renal/Endo    (+) obesity, hiatal hernia, GERD    Musculoskeletal     Abdominal    Substance History      OB/GYN          Other - negative ROS                     Anesthesia Plan    ASA 2     MAC   total IV anesthesia  (I have reviewed the patient's history with the patient and the chart, including all pertinent laboratory results and imaging. I have explained the risks of anesthesia including but not limited to dental damage, corneal abrasion, nerve injury, MI, stroke, and death. Questions asked and answered. Anesthetic plan discussed with patient and team as indicated. Patient expressed understanding of the above.  )    Anesthetic plan, risks, benefits, and alternatives have been provided, discussed and informed consent has been obtained with: patient.    CODE STATUS:

## 2023-09-15 NOTE — BRIEF OP NOTE
COLONOSCOPY  Progress Note    Esther Duke  9/15/2023    Pre-op Diagnosis:   Screen for colon cancer [Z12.11]       Post-Op Diagnosis Codes:     * Screen for colon cancer [Z12.11]     * Melanosis [L81.4]     * Diverticulosis [K57.90]    Procedure/CPT® Codes:        Procedure(s):  COLONOSCOPY to Cecum              Surgeon(s):  Joe Del Cid MD    Anesthesia: Monitored Anesthesia Care    Staff:   Endo Technician: Marianela Graham RN  Endo Nurse: Jana Badillo RN         Estimated Blood Loss: none    Urine Voided: * No values recorded between 9/15/2023  1:37 PM and 9/15/2023  1:54 PM *    Specimens:                None          Drains: * No LDAs found *    Findings: Colon to Ti good prep  Mild Melanosis Coli  Rare Sigmoid Diverticulosis        Complications: none          Joe Del Cid MD     Date: 9/15/2023  Time: 13:55 EDT

## 2023-09-15 NOTE — ANESTHESIA POSTPROCEDURE EVALUATION
"Patient: Esther Duke    Procedure Summary       Date: 09/15/23 Room / Location: [facility] / [facility]    Anesthesia Start: 1336 Anesthesia Stop: 1358    Procedure: COLONOSCOPY to Cecum Diagnosis:       Screen for colon cancer      Melanosis      Diverticulosis      (Screen for colon cancer [Z12.11])    Surgeons: Joe Del Cid MD Provider: Ignacio Gonzalez MD    Anesthesia Type: MAC ASA Status: 2            Anesthesia Type: MAC    Vitals  No vitals data found for the desired time range.          Post Anesthesia Care and Evaluation    Patient location during evaluation: PACU  Patient participation: complete - patient participated  Level of consciousness: awake and alert  Pain management: adequate    Airway patency: patent  Anesthetic complications: No anesthetic complications  PONV Status: controlled  Cardiovascular status: acceptable and hemodynamically stable  Respiratory status: acceptable  Hydration status: acceptable    Comments: /75 (BP Location: Left arm, Patient Position: Lying)   Pulse 74   Temp 36.4 °C (97.6 °F) (Tympanic)   Resp 18   Ht 162.6 cm (64\")   Wt 71.8 kg (158 lb 6.4 oz)   SpO2 100%   BMI 27.19 kg/m²     "

## 2023-09-19 ENCOUNTER — LAB (OUTPATIENT)
Dept: LAB | Facility: HOSPITAL | Age: 63
End: 2023-09-19
Payer: COMMERCIAL

## 2023-09-19 ENCOUNTER — OFFICE VISIT (OUTPATIENT)
Dept: GASTROENTEROLOGY | Facility: CLINIC | Age: 63
End: 2023-09-19
Payer: COMMERCIAL

## 2023-09-19 VITALS
WEIGHT: 161 LBS | BODY MASS INDEX: 27.49 KG/M2 | HEIGHT: 64 IN | SYSTOLIC BLOOD PRESSURE: 120 MMHG | DIASTOLIC BLOOD PRESSURE: 84 MMHG

## 2023-09-19 DIAGNOSIS — K29.30 SUPERFICIAL GASTRITIS WITHOUT HEMORRHAGE, UNSPECIFIED CHRONICITY: ICD-10-CM

## 2023-09-19 DIAGNOSIS — K21.00 GASTROESOPHAGEAL REFLUX DISEASE WITH ESOPHAGITIS WITHOUT HEMORRHAGE: Primary | ICD-10-CM

## 2023-09-19 DIAGNOSIS — K58.1 IRRITABLE BOWEL SYNDROME WITH CONSTIPATION: ICD-10-CM

## 2023-09-19 PROCEDURE — 86677 HELICOBACTER PYLORI ANTIBODY: CPT

## 2023-09-19 PROCEDURE — 36415 COLL VENOUS BLD VENIPUNCTURE: CPT

## 2023-09-19 NOTE — PROGRESS NOTES
PATIENT INFORMATION  Esther Duke       - 1960    CHIEF COMPLAINT  Chief Complaint   Patient presents with    Heartburn    Irritable Bowel Syndrome       HISTORY OF PRESENT ILLNESS    Here today for GERD and CIC follow-up    Per LOV: Increased her linzess to 145, was previously doing well on amitiza, but insurance refused medication. Better with this dose, moving every day and easy to pass. Was previously on chronic senna. Easy to pass, rarely skips a day, reviewed can use miralax those days. No bleeding. TODAY: Has been on daily linzess and after colon was asked to do miralax at night. Has been doing senna at night pretty regularly, reviewed weaning off stimulants.    Per LOV: Sucks on sour candy in the morning to take care of hunger feeling, no pains, reflux, HB, nausea, dysphagia. Concerned about ulcer. Eating early, no snacking, able to eat ok, no loss of appetite or early satiety. Reviewed PPI AM and before dinner, pepcid lunch and bedtime. Pains a month ago when made appt, but was high stress time and feeling better now. Reviewed can take carafate up to 4 times a day PRN. TODAY: Doing well on  on reflux meds, breakthrough if eats late, but avoids doing so. Otherwise doing pretty well.    Issues with sinuses recently, coughing up phlegm in the mornings. Seeing ENT tomorrow. May repeat EGD, but will await his exam.     9/15/2023 Last Colon with mild melanosis, diverticulosis.     Last EGD 2019 with chemical gastritis, normal small bowel, reflux esophagitis, negative for Barretts, HP.    Heartburn  She complains of coughing. She reports no abdominal pain or no nausea.   Irritable Bowel Syndrome  Associated symptoms include congestion and coughing. Pertinent negatives include no abdominal pain, nausea or vomiting.     REVIEWED PERTINENT RESULTS/ LABS  Lab Results   Component Value Date    CASEREPORT  2019     Surgical Pathology Report                         Case: [id number]               "                    Authorizing Provider:  Deanna Lorenzo MD         Collected:           05/14/2019 09:43 AM          Pathologist:           Marge Walker MD  Received:            05/15/2019 05:42 AM          Specimens:   1) - Gastric, epigastric                                                                            2) - Small Intestine, small bowel                                                                   3) - Gastric, gastric polyp                                                                         4) - Esophagus, Distal                                                                     FINALDX  05/14/2019     1.  Stomach, Biopsy:    A.  Chemical gastropathy.   B.  Negative for Helicobacter by routine staining.   C.  No metaplasia, dysplasia or malignancy identified.     2.  Small Bowel, Biopsy:  Benign small bowel mucosa with   A. Normal intact villous surface.   B. No significant inflammation, no granulomas.   C. No viral inclusions or other organisms on routinely stained sections.     3.  \"Gastric Polyp\", Biopsy:    A.  Fragment of gastric mucosa with chemical gastropathy.   B.  Benign fundic gland polyp.    C.  No metaplasia, dysplasia or malignancy identified.     4.  Distal Esophagus, Biopsy:    A.  Benign gastroesophageal junction mucosa with reflux esophagitis.   B.  Negative for goblet cell metaplasia and dysplasia.      swm/brb        Lab Results   Component Value Date    HGB 13.4 06/15/2023    MCV 85.3 06/15/2023     06/15/2023    ALT 14 06/15/2023    AST 15 06/15/2023    TRIG 96 06/15/2023      CT Chest With Contrast Diagnostic    Result Date: 9/13/2023  Narrative: CT CHEST WITH IV CONTRAST  HISTORY: Chronic cough, sinusitis.  TECHNIQUE: Radiation dose reduction techniques were utilized, including automated exposure control and exposure modulation based on body size. 3 mm images were obtained through the chest after the administration of IV contrast. Motion artifact.  " COMPARISON: Chest x-ray 06/27/2023:  FINDINGS:  Thoracic inlet: Within normal limits. Please refer to the separate dictated report of the CT sinuses.  Nodular breast tissue, ensure up-to-date with mammography.  Heart and great vessels: The heart size is within normal limits. Scattered calcific atherosclerosis thoracoabdominal aorta and branch vessels.  Lymphatics: Calcified granulomas.  Lung parenchyma and pleural space: Patent central airway. Dependent bibasilar atelectasis. No focal consolidations, effusions, or pneumothorax. Calcified granulomas. Micronodule left lower lobe (axial mage 59)  Upper abdomen: Small hiatal hernia with thickening of the distal esophagus which can be seen with reflux but better evaluated with upper GI or endoscopy. Mild hepatic steatosis. Cholecystectomy. Calcified granuloma spleen. The upper abdomen is otherwise unremarkable.  Bone windows: Multilevel degenerative changes.       Impression: 1. No acute cardiopulmonary process. 2. Calcified and noncalcified micronodules favoring the sequela of prior granulomatous disease. Consider 1 year follow-up particularly if this is a high risk patient/smoker. 3. Please see above for additional incidental findings/recommendations.  This report was finalized on 9/13/2023 6:01 PM by Dr. Blaine Pete M.D.      CT Sinus Without Contrast    Result Date: 9/13/2023  Narrative: CT SCAN OF THE PARANASAL SINUSES WITHOUT CONTRAST ON 09/12/2023.   CLINICAL HISTORY: Chronic sinusitis  TECHNIQUE: Spiral CT images were obtained through the paranasal sinuses without contrast and images were reformatted and submitted in 1 mm thick axial and coronal CT sections with bone and soft tissue algorithm and 1 mm thick sagittal reconstructions were performed and submitted in bone algorithm  COMPARISON:  There are no prior sinus CTs for comparison.  FINDINGS: There is subtotal near complete opacification of the right frontal sinus and right frontal recess with circumferential  mucosal thickening and central fluid, partial opacification of the left frontal sinus and left frontal recess with fluid and mucosal thickening. There is complete opacification of the anterior ethmoid sinuses and partial opacification of the posterior ethmoid sinuses with circumferential mucosal thickening and some central fluid and there is a hypoplastic right sphenoid sinus that subtotally opacified with fluid and mucosal thickening.  There is only minimal mucosal thickening in the anteromedial left sphenoid sinus. There is some mucosal thickening in the inferior medial right maxillary sinus and partially opacified accessory compartment in the anteromedial right maxillary sinus with fluid and mucosal thickening. There is moderate mucosal thickening and small amount of fluid partially opacifying the left maxillary sinus. There is also opacification of thais bullosa of the middle turbinates bilaterally. There are tiny opacities in the mid and superior nasal cavity that could be nasal secretions or tiny nasal polyps. There is mild nasal septal deviation to the patient's right with a rightward directed nasal septal spur that abuts the medial margin of the right middle turbinate.      Impression: 1. Extensive paranasal sinus opacification, with subtotal opacification of the right frontal sinus and partial opacification of the left frontal sinus with fluid and mucosal thickening and  there is near complete opacification of frontal recesses and there is complete opacification of the anterior ethmoid sinuses and partial opacification posterior ethmoid sinuses bilaterally with fluid and mucosal thickening, subtotal opacification of a hypoplastic right sphenoid sinus with fluid and mucosal thickening but only minimal mucosal thickening in the anteromedial left sphenoid sinus. There is mucosal thickening in the inferior medial right maxillary sinus and partially opacified accessory compartment in the anteromedial right  maxillary sinus with fluid and mucosal thickening and there is partial opacification of the left maxillary sinus with mucosal thickening and a small amount of posterior fluid. Furthermore there is complete opacification of thais bullosa of the middle turbinates. There are tiny opacities in the nasal cavity that could be nasal secretion or tiny nasal polyps, correlate clinically as whether the patient has sinonasal polyps.  2. There is mild nasal septal deviation to the patient's right with a small rightward directed nasal septal spur that abuts the right middle turbinate.  Radiation dose reduction techniques were utilized, including automated exposure control and exposure modulation based on body size.   This report was finalized on 9/13/2023 9:57 AM by Dr. Riki Guerra M.D.       REVIEW OF SYSTEMS  Review of Systems   Constitutional: Negative.    HENT:  Positive for congestion.    Eyes: Negative.    Respiratory:  Positive for cough.    Cardiovascular: Negative.    Gastrointestinal:  Positive for constipation. Negative for abdominal pain, diarrhea, nausea and vomiting.        GERD, IBS   Endocrine: Negative.    Genitourinary: Negative.    Musculoskeletal: Negative.    Skin: Negative.    Allergic/Immunologic: Negative.    Neurological: Negative.    Hematological: Negative.    Psychiatric/Behavioral: Negative.         ACTIVE PROBLEMS  Patient Active Problem List    Diagnosis     Screen for colon cancer [Z12.11]     Irritable bowel syndrome with constipation [K58.1]     Gastroesophageal reflux disease with esophagitis without hemorrhage [K21.00]     Chronic foot pain, right [M79.671, G89.29]     Fibromyalgia [M79.7]     Other hyperlipidemia [E78.49]     Reactive depression [F32.9]          PAST MEDICAL HISTORY  Past Medical History:   Diagnosis Date    Fibromyalgia     Gastroesophageal reflux disease without esophagitis     Hernia, hiatal     High cholesterol     IBS (irritable bowel syndrome)     Other hyperlipidemia      Reactive depression          SURGICAL HISTORY  Past Surgical History:   Procedure Laterality Date    COLONOSCOPY      COLONOSCOPY N/A 9/15/2023    Procedure: COLONOSCOPY to Cecum;  Surgeon: Joe Del Cid MD;  Location: [facility] [place] MAIN OR;  Service: Gastroenterology;  Laterality: N/A;  Melanosis, Diverticulosis    LAPAROSCOPIC CHOLECYSTECTOMY      TONSILLECTOMY           FAMILY HISTORY  Family History   Problem Relation Age of Onset    Hypertension Mother     Hyperlipidemia Mother     Hypertension Father     Hyperlipidemia Father     Stroke Father     Cancer Sister     Aneurysm Maternal Uncle     Heart disease Maternal Grandmother     Colon cancer Neg Hx     Colon polyps Neg Hx          SOCIAL HISTORY  Social History     Occupational History    Not on file   Tobacco Use    Smoking status: Never     Passive exposure: Never    Smokeless tobacco: Never    Tobacco comments:     caffeine use   Vaping Use    Vaping Use: Never used   Substance and Sexual Activity    Alcohol use: No    Drug use: No    Sexual activity: Defer         CURRENT MEDICATIONS    Current Outpatient Medications:     albuterol sulfate  (90 Base) MCG/ACT inhaler, , Disp: , Rfl:     albuterol sulfate  (90 Base) MCG/ACT inhaler, Inhale 2 puffs 4 (Four) Times a Day., Disp: 18 g, Rfl: 0    budesonide-formoterol (Symbicort) 80-4.5 MCG/ACT inhaler, Inhale 2 puffs 2 (Two) Times a Day., Disp: 1 each, Rfl: 0    DULoxetine (CYMBALTA) 30 MG capsule, Take 1 capsule by mouth Daily., Disp: 90 capsule, Rfl: 3    DULoxetine (CYMBALTA) 60 MG capsule, Take 1 capsule by mouth Daily., Disp: 90 capsule, Rfl: 3    famotidine (PEPCID) 40 MG tablet, TAKE ONE TABLET BY MOUTH TWICE A DAY, Disp: 180 tablet, Rfl: 3    fluticasone (FLONASE) 50 MCG/ACT nasal spray, 2 sprays into the nostril(s) as directed by provider Daily., Disp: 16 g, Rfl: 0    gabapentin (NEURONTIN) 300 MG capsule, Take 1 capsule by mouth 2 (Two) Times a Day., Disp: 60 capsule, Rfl: 3    " pantoprazole (PROTONIX) 40 MG EC tablet, Take 1 tablet by mouth 2 (Two) Times a Day., Disp: 180 tablet, Rfl: 3    Pitavastatin Calcium (Livalo) 4 MG tablet, Take 1 tablet by mouth Every Night., Disp: 30 tablet, Rfl: 2    sucralfate (Carafate) 1 g tablet, Take 1 tablet by mouth every night at bedtime. If cant swallow, may cut or Crush tablet, 30 min after other meds, before bedtime., Disp: 90 tablet, Rfl: 3    Xiidra 5 % ophthalmic solution, , Disp: , Rfl:     linaclotide (LINZESS) 290 MCG capsule capsule, Take 1 capsule by mouth Every Morning Before Breakfast., Disp: 90 capsule, Rfl: 3    ALLERGIES  Atorvastatin, Codeine, Crestor [rosuvastatin calcium], Simvastatin, and Sulfa antibiotics    VITALS  Vitals:    09/19/23 0855   BP: 120/84   BP Location: Left arm   Patient Position: Sitting   Cuff Size: Adult   Weight: 73 kg (161 lb)   Height: 162.6 cm (64.02\")       PHYSICAL EXAM  Debilities/Disabilities Identified: None  Emotional Behavior: Appropriate  Wt Readings from Last 3 Encounters:   09/19/23 73 kg (161 lb)   09/15/23 71.8 kg (158 lb 6.4 oz)   08/05/23 71.7 kg (158 lb)     Ht Readings from Last 1 Encounters:   09/19/23 162.6 cm (64.02\")     Body mass index is 27.62 kg/m².  Physical Exam  Constitutional:       General: She is not in acute distress.     Appearance: Normal appearance. She is not ill-appearing.   HENT:      Head: Normocephalic and atraumatic.      Mouth/Throat:      Mouth: Mucous membranes are moist.      Pharynx: No posterior oropharyngeal erythema.   Eyes:      General: No scleral icterus.  Cardiovascular:      Rate and Rhythm: Normal rate and regular rhythm.      Heart sounds: Normal heart sounds.   Pulmonary:      Effort: Pulmonary effort is normal.      Breath sounds: Normal breath sounds.   Abdominal:      General: Abdomen is flat. Bowel sounds are normal. There is no distension.      Palpations: Abdomen is soft. There is no mass.      Tenderness: There is no abdominal tenderness in the " right lower quadrant, epigastric area, periumbilical area and left upper quadrant. There is no guarding or rebound. Negative signs include Fraknlin's sign.      Hernia: No hernia is present.   Musculoskeletal:      Cervical back: Neck supple.   Skin:     General: Skin is warm.      Capillary Refill: Capillary refill takes less than 2 seconds.   Neurological:      General: No focal deficit present.      Mental Status: She is alert and oriented to person, place, and time.   Psychiatric:         Mood and Affect: Mood normal.         Behavior: Behavior normal.         Thought Content: Thought content normal.         Judgment: Judgment normal.       CLINICAL DATA REVIEWED   reviewed previous lab results and integrated with today's visit, reviewed notes from other physicians and/or last GI encounter, reviewed previous endoscopy results and available photos, reviewed surgical pathology results from previous biopsies    ASSESSMENT  Diagnoses and all orders for this visit:    Gastroesophageal reflux disease with esophagitis without hemorrhage    Irritable bowel syndrome with constipation    Other orders  -     linaclotide (LINZESS) 290 MCG capsule capsule; Take 1 capsule by mouth Every Morning Before Breakfast.          PLAN    Stop senna, increase linzess, add miralax at night  Avoid eating late, Pretreat bedtime with tums when eating late, elevate HOB  Recommend adding daily fiber supplement    Return in about 3 months (around 12/19/2023).    I have discussed the above plan with the patient.  They verbalize understanding and are in agreement with the plan.  They have been advised to contact the office for any questions, concerns, or changes related to their health.

## 2023-09-20 ENCOUNTER — OFFICE VISIT (OUTPATIENT)
Dept: FAMILY MEDICINE CLINIC | Facility: CLINIC | Age: 63
End: 2023-09-20
Payer: COMMERCIAL

## 2023-09-20 ENCOUNTER — LAB (OUTPATIENT)
Dept: LAB | Facility: HOSPITAL | Age: 63
End: 2023-09-20
Payer: COMMERCIAL

## 2023-09-20 VITALS
TEMPERATURE: 97.3 F | WEIGHT: 161 LBS | HEIGHT: 64 IN | HEART RATE: 74 BPM | DIASTOLIC BLOOD PRESSURE: 72 MMHG | RESPIRATION RATE: 18 BRPM | OXYGEN SATURATION: 99 % | SYSTOLIC BLOOD PRESSURE: 118 MMHG | BODY MASS INDEX: 27.49 KG/M2

## 2023-09-20 DIAGNOSIS — E78.49 OTHER HYPERLIPIDEMIA: Primary | ICD-10-CM

## 2023-09-20 DIAGNOSIS — K58.1 IRRITABLE BOWEL SYNDROME WITH CONSTIPATION: ICD-10-CM

## 2023-09-20 DIAGNOSIS — K21.00 GASTROESOPHAGEAL REFLUX DISEASE WITH ESOPHAGITIS WITHOUT HEMORRHAGE: ICD-10-CM

## 2023-09-20 DIAGNOSIS — M79.7 FIBROMYALGIA: ICD-10-CM

## 2023-09-20 DIAGNOSIS — F32.9 REACTIVE DEPRESSION: ICD-10-CM

## 2023-09-20 PROBLEM — Z12.11 SCREEN FOR COLON CANCER: Status: RESOLVED | Noted: 2022-12-22 | Resolved: 2023-09-20

## 2023-09-20 LAB
H PYLORI IGA SER-ACNC: 12.5 UNITS (ref 0–8.9)
H PYLORI IGG SER IA-ACNC: 0.18 INDEX VALUE (ref 0–0.79)
H PYLORI IGM SER-ACNC: <9 UNITS (ref 0–8.9)

## 2023-09-20 PROCEDURE — 99214 OFFICE O/P EST MOD 30 MIN: CPT | Performed by: INTERNAL MEDICINE

## 2023-09-20 RX ORDER — CLARITHROMYCIN 500 MG/1
500 TABLET, COATED ORAL 2 TIMES DAILY
Qty: 28 TABLET | Refills: 0 | Status: SHIPPED | OUTPATIENT
Start: 2023-09-20

## 2023-09-20 RX ORDER — AMOXICILLIN 500 MG/1
1000 CAPSULE ORAL 2 TIMES DAILY
Qty: 56 CAPSULE | Refills: 0 | Status: SHIPPED | OUTPATIENT
Start: 2023-09-20

## 2023-09-20 NOTE — PROGRESS NOTES
Subjective   Esther Duke is a 63 y.o. female.     Chief Complaint   Patient presents with    Hyperlipidemia     Follow up    Atrial bowels, GERD, fibromyalgia, depression    Hyperlipidemia  Pertinent negatives include no chest pain or shortness of breath.    Patient here follow-up for hyperlipidemia, GERD, fibromyalgia, depression.  Has irritable bowels.  No chest pain shortness of breath.  Patient has chronic sinus problems since summer started.  Patient has seen Dr. Franklin had CT of the chest and sinuses done.  Has extensive sinusitis.  Discussed with patient on the chest she has lung nodules that needs to be followed in the years time.  Most likely going to be from fungus.  Went to the list of medications using.  She is also using inhalers.  The following portions of the patient's history were reviewed and updated as appropriate: allergies, current medications, past family history, past medical history, past social history, past surgical history and problem list.    Review of Systems   Constitutional:  Negative for activity change, appetite change, fatigue and fever.   HENT:  Positive for postnasal drip, rhinorrhea and sinus pressure.    Eyes:  Negative for blurred vision and double vision.   Respiratory:  Positive for cough. Negative for apnea, chest tightness, shortness of breath, wheezing and stridor.    Cardiovascular:  Negative for chest pain, palpitations and leg swelling.   Gastrointestinal: Negative.    Neurological:  Negative for dizziness, syncope, light-headedness and headache.   Psychiatric/Behavioral: Negative.       Allergies   Allergen Reactions    Atorvastatin Myalgia    Codeine GI Intolerance    Crestor [Rosuvastatin Calcium] Myalgia    Simvastatin Myalgia    Sulfa Antibiotics Hives       Current Outpatient Medications on File Prior to Visit   Medication Sig Dispense Refill    albuterol sulfate  (90 Base) MCG/ACT inhaler Inhale 2 puffs 4 (Four) Times a Day. 18 g 0     budesonide-formoterol (Symbicort) 80-4.5 MCG/ACT inhaler Inhale 2 puffs 2 (Two) Times a Day. 1 each 0    DULoxetine (CYMBALTA) 30 MG capsule Take 1 capsule by mouth Daily. 90 capsule 3    DULoxetine (CYMBALTA) 60 MG capsule Take 1 capsule by mouth Daily. 90 capsule 3    famotidine (PEPCID) 40 MG tablet TAKE ONE TABLET BY MOUTH TWICE A  tablet 3    fluticasone (FLONASE) 50 MCG/ACT nasal spray 2 sprays into the nostril(s) as directed by provider Daily. 16 g 0    gabapentin (NEURONTIN) 300 MG capsule Take 1 capsule by mouth 2 (Two) Times a Day. 60 capsule 3    linaclotide (LINZESS) 290 MCG capsule capsule Take 1 capsule by mouth Every Morning Before Breakfast. 90 capsule 3    pantoprazole (PROTONIX) 40 MG EC tablet Take 1 tablet by mouth 2 (Two) Times a Day. 180 tablet 3    Pitavastatin Calcium (Livalo) 4 MG tablet Take 1 tablet by mouth Every Night. 30 tablet 2    sucralfate (Carafate) 1 g tablet Take 1 tablet by mouth every night at bedtime. If cant swallow, may cut or Crush tablet, 30 min after other meds, before bedtime. 90 tablet 3    Xiidra 5 % ophthalmic solution       albuterol sulfate  (90 Base) MCG/ACT inhaler        No current facility-administered medications on file prior to visit.       Family History   Problem Relation Age of Onset    Hypertension Mother     Hyperlipidemia Mother     Hypertension Father     Hyperlipidemia Father     Stroke Father     Cancer Sister     Aneurysm Maternal Uncle     Heart disease Maternal Grandmother     Colon cancer Neg Hx     Colon polyps Neg Hx        Past Medical History:   Diagnosis Date    Fibromyalgia     Gastroesophageal reflux disease without esophagitis     Hernia, hiatal     High cholesterol     IBS (irritable bowel syndrome)     Other hyperlipidemia     Reactive depression        Past Surgical History:   Procedure Laterality Date    COLONOSCOPY      COLONOSCOPY N/A 9/15/2023    Procedure: COLONOSCOPY to Cecum;  Surgeon: Joe Del Cid,  "MD;  [place] [facility] [place];  Service: Gastroenterology;  Laterality: N/A;  Melanosis, Diverticulosis    LAPAROSCOPIC CHOLECYSTECTOMY      TONSILLECTOMY         Social History     Socioeconomic History    Marital status:    Tobacco Use    Smoking status: Never     Passive exposure: Never    Smokeless tobacco: Never    Tobacco comments:     caffeine use   Vaping Use    Vaping Use: Never used   Substance and Sexual Activity    Alcohol use: No    Drug use: No    Sexual activity: Defer       Patient Active Problem List   Diagnosis    Fibromyalgia    Other hyperlipidemia    Reactive depression    Chronic foot pain, right    Irritable bowel syndrome with constipation    Gastroesophageal reflux disease with esophagitis without hemorrhage       /72 (BP Location: Left arm, Patient Position: Sitting, Cuff Size: Adult)   Pulse 74   Temp 97.3 °F (36.3 °C) (Temporal)   Resp 18   Ht 162.6 cm (64.02\")   Wt 73 kg (161 lb)   SpO2 99%   BMI 27.62 kg/m²   Body mass index is 27.62 kg/m².    Objective   Physical Exam  Vitals and nursing note reviewed.   Constitutional:       Appearance: She is well-developed.   Eyes:      Pupils: Pupils are equal, round, and reactive to light.   Neck:      Thyroid: No thyromegaly.      Vascular: No JVD.      Trachea: No tracheal deviation.   Cardiovascular:      Rate and Rhythm: Normal rate and regular rhythm.      Heart sounds: No murmur heard.  Pulmonary:      Effort: Pulmonary effort is normal. No respiratory distress.      Breath sounds: Wheezing present.   Abdominal:      General: Bowel sounds are normal. There is no distension.      Palpations: Abdomen is soft. There is no mass.      Tenderness: There is no abdominal tenderness. There is no right CVA tenderness, left CVA tenderness, guarding or rebound.      Hernia: No hernia is present.   Musculoskeletal:      Cervical back: Normal range of motion and neck supple.   Lymphadenopathy:      Cervical: No cervical adenopathy. "   Neurological:      General: No focal deficit present.      Mental Status: She is alert and oriented to person, place, and time. Mental status is at baseline.   Psychiatric:         Mood and Affect: Mood normal.         Behavior: Behavior normal.         Assessment & Plan   Diagnoses and all orders for this visit:    1. Other hyperlipidemia (Primary)  -     CBC & Differential  -     CK  -     Comprehensive Metabolic Panel  -     Lipid Panel With / Chol / HDL Ratio  -     TSH    2. Irritable bowel syndrome with constipation  -     CBC & Differential  -     CK  -     Comprehensive Metabolic Panel  -     Lipid Panel With / Chol / HDL Ratio  -     TSH    3. Reactive depression  -     CBC & Differential  -     CK  -     Comprehensive Metabolic Panel  -     Lipid Panel With / Chol / HDL Ratio  -     TSH    4. Fibromyalgia  -     CBC & Differential  -     CK  -     Comprehensive Metabolic Panel  -     Lipid Panel With / Chol / HDL Ratio  -     TSH    5. Gastroesophageal reflux disease with esophagitis without hemorrhage  -     CBC & Differential  -     CK  -     Comprehensive Metabolic Panel  -     Lipid Panel With / Chol / HDL Ratio  -     TSH    Continue diet and exercise.  Continue taking Protonix, Pepcid, Carafate is helping her.  She is using inhalers that is helping her.  Patient currently on Cymbalta, Neurontin, Linzess, Livalo.  Cymbalta is helping her depression symptoms.  All her problems are chronic and stable.  Sinus problems continues.  Patient does have wheezing in her lungs especially in the left side.  Recent CAT scan showed no COPD, she has not been exposed to smoking.  Avoid for her appointment with Dr. Franklin.  If needed may need a referral to ENT for PFTs.  Discussed with patient to remind me in 1 years time to get a CAT scan of her chest again.  EHR dragon/transcription disclaimer:  Part of this note are created by electronic transcription/translation of spoken language to printed text and thus may  lead to erroneous, or at times, nonsensical words or phrases inadvertently transcribed.  Although I have reviewed for such errors, some may still exist.

## 2023-10-06 DIAGNOSIS — M79.7 FIBROMYALGIA: ICD-10-CM

## 2023-10-06 NOTE — TELEPHONE ENCOUNTER
Caller: Esther Duke    Relationship: Self    Best call back number: [phone]     Requested Prescriptions:   Requested Prescriptions     Pending Prescriptions Disp Refills    gabapentin (NEURONTIN) 300 MG capsule 60 capsule 3     Sig: Take 1 capsule by mouth 2 (Two) Times a Day.        Pharmacy where request should be sent: [id number] [place], [place] - [address] - [phone] PH - [phone] FX     Last office visit with prescribing clinician: 9/20/2023   Last telemedicine visit with prescribing clinician: Visit date not found   Next office visit with prescribing clinician: 1/10/2024     Additional details provided by patient: PATIENT STATES THAT SHE 3 CAPSULES REMAINING    Does the patient have less than a 3 day supply:  [x] Yes  [] No    Would you like a call back once the refill request has been completed: [] Yes [x] No    If the office needs to give you a call back, can they leave a voicemail: [] Yes [x] No    Geneva Paul Rep   10/06/23 08:44 EDT

## 2023-10-08 RX ORDER — GABAPENTIN 300 MG/1
300 CAPSULE ORAL 2 TIMES DAILY
Qty: 60 CAPSULE | Refills: 3 | Status: SHIPPED | OUTPATIENT
Start: 2023-10-08

## 2023-10-27 DIAGNOSIS — J06.9 ACUTE URI: ICD-10-CM

## 2023-10-27 RX ORDER — ALBUTEROL SULFATE 90 UG/1
2 AEROSOL, METERED RESPIRATORY (INHALATION)
Qty: 18 G | Refills: 2 | Status: SHIPPED | OUTPATIENT
Start: 2023-10-27

## 2023-11-02 ENCOUNTER — HOSPITAL ENCOUNTER (OUTPATIENT)
Facility: SURGERY CENTER | Age: 63
Setting detail: HOSPITAL OUTPATIENT SURGERY
Discharge: HOME | End: 2023-11-02
Attending: OTOLARYNGOLOGY | Admitting: OTOLARYNGOLOGY
Payer: COMMERCIAL

## 2023-11-02 ENCOUNTER — ANESTHESIA EVENT (OUTPATIENT)
Dept: SURGERY | Facility: SURGERY CENTER | Age: 63
End: 2023-11-02
Payer: COMMERCIAL

## 2023-11-02 ENCOUNTER — ANESTHESIA (OUTPATIENT)
Dept: SURGERY | Facility: SURGERY CENTER | Age: 63
End: 2023-11-02
Payer: COMMERCIAL

## 2023-11-02 VITALS
OXYGEN SATURATION: 95 % | WEIGHT: 160 LBS | TEMPERATURE: 98 F | HEART RATE: 67 BPM | DIASTOLIC BLOOD PRESSURE: 73 MMHG | SYSTOLIC BLOOD PRESSURE: 140 MMHG | BODY MASS INDEX: 27.31 KG/M2 | RESPIRATION RATE: 18 BRPM | HEIGHT: 64 IN

## 2023-11-02 DIAGNOSIS — J32.2 CHRONIC ETHMOIDAL SINUSITIS: ICD-10-CM

## 2023-11-02 PROCEDURE — 25010000002 FENTANYL CITRATE (PF) 50 MCG/ML SOLUTION: Performed by: ANESTHESIOLOGY

## 2023-11-02 PROCEDURE — 87070 CULTURE OTHR SPECIMN AEROBIC: CPT | Performed by: OTOLARYNGOLOGY

## 2023-11-02 PROCEDURE — 87075 CULTR BACTERIA EXCEPT BLOOD: CPT | Performed by: OTOLARYNGOLOGY

## 2023-11-02 PROCEDURE — 25010000002 FENTANYL CITRATE (PF) 50 MCG/ML SOLUTION: Performed by: NURSE ANESTHETIST, CERTIFIED REGISTERED

## 2023-11-02 PROCEDURE — 25010000002 MIDAZOLAM PER 1 MG: Performed by: ANESTHESIOLOGY

## 2023-11-02 PROCEDURE — 88305 TISSUE EXAM BY PATHOLOGIST: CPT | Performed by: OTOLARYNGOLOGY

## 2023-11-02 PROCEDURE — 25010000002 DROPERIDOL PER 5 MG: Performed by: NURSE ANESTHETIST, CERTIFIED REGISTERED

## 2023-11-02 PROCEDURE — 87102 FUNGUS ISOLATION CULTURE: CPT | Performed by: OTOLARYNGOLOGY

## 2023-11-02 PROCEDURE — 25810000003 LACTATED RINGERS PER 1000 ML: Performed by: ANESTHESIOLOGY

## 2023-11-02 PROCEDURE — 88312 SPECIAL STAINS GROUP 1: CPT | Performed by: OTOLARYNGOLOGY

## 2023-11-02 PROCEDURE — 31255 NSL/SINS NDSC W/TOT ETHMDCT: CPT | Performed by: OTOLARYNGOLOGY

## 2023-11-02 PROCEDURE — 25810000003 SODIUM CHLORIDE 0.9 % SOLUTION: Performed by: OTOLARYNGOLOGY

## 2023-11-02 PROCEDURE — 87205 SMEAR GRAM STAIN: CPT | Performed by: OTOLARYNGOLOGY

## 2023-11-02 PROCEDURE — 25010000002 ONDANSETRON PER 1 MG: Performed by: NURSE ANESTHETIST, CERTIFIED REGISTERED

## 2023-11-02 PROCEDURE — 25010000002 DEXAMETHASONE SODIUM PHOSPHATE 20 MG/5ML SOLUTION: Performed by: NURSE ANESTHETIST, CERTIFIED REGISTERED

## 2023-11-02 PROCEDURE — 31267 ENDOSCOPY MAXILLARY SINUS: CPT | Performed by: OTOLARYNGOLOGY

## 2023-11-02 PROCEDURE — 25010000002 SUGAMMADEX 200 MG/2ML SOLUTION: Performed by: NURSE ANESTHETIST, CERTIFIED REGISTERED

## 2023-11-02 PROCEDURE — 25010000002 PROPOFOL 10 MG/ML EMULSION: Performed by: NURSE ANESTHETIST, CERTIFIED REGISTERED

## 2023-11-02 PROCEDURE — 88311 DECALCIFY TISSUE: CPT | Performed by: OTOLARYNGOLOGY

## 2023-11-02 RX ORDER — GINSENG 100 MG
CAPSULE ORAL AS NEEDED
Status: DISCONTINUED | OUTPATIENT
Start: 2023-11-02 | End: 2023-11-02 | Stop reason: HOSPADM

## 2023-11-02 RX ORDER — DIPHENHYDRAMINE HYDROCHLORIDE 50 MG/ML
12.5 INJECTION INTRAMUSCULAR; INTRAVENOUS
Status: DISCONTINUED | OUTPATIENT
Start: 2023-11-02 | End: 2023-11-02 | Stop reason: HOSPADM

## 2023-11-02 RX ORDER — OXYMETAZOLINE HYDROCHLORIDE 0.05 G/100ML
SPRAY NASAL AS NEEDED
Status: DISCONTINUED | OUTPATIENT
Start: 2023-11-02 | End: 2023-11-02 | Stop reason: HOSPADM

## 2023-11-02 RX ORDER — OXYMETAZOLINE HYDROCHLORIDE 0.05 G/100ML
2 SPRAY NASAL 2 TIMES DAILY
Status: DISCONTINUED | OUTPATIENT
Start: 2023-11-02 | End: 2023-11-02 | Stop reason: HOSPADM

## 2023-11-02 RX ORDER — FENTANYL CITRATE 50 UG/ML
50 INJECTION, SOLUTION INTRAMUSCULAR; INTRAVENOUS ONCE AS NEEDED
Status: COMPLETED | OUTPATIENT
Start: 2023-11-02 | End: 2023-11-02

## 2023-11-02 RX ORDER — FENTANYL CITRATE 50 UG/ML
25 INJECTION, SOLUTION INTRAMUSCULAR; INTRAVENOUS
Status: DISCONTINUED | OUTPATIENT
Start: 2023-11-02 | End: 2023-11-02 | Stop reason: HOSPADM

## 2023-11-02 RX ORDER — HYDROCODONE BITARTRATE AND ACETAMINOPHEN 7.5; 325 MG/1; MG/1
1 TABLET ORAL ONCE
Status: COMPLETED | OUTPATIENT
Start: 2023-11-02 | End: 2023-11-02

## 2023-11-02 RX ORDER — ONDANSETRON 2 MG/ML
INJECTION INTRAMUSCULAR; INTRAVENOUS AS NEEDED
Status: DISCONTINUED | OUTPATIENT
Start: 2023-11-02 | End: 2023-11-02 | Stop reason: SURG

## 2023-11-02 RX ORDER — DROPERIDOL 2.5 MG/ML
0.62 INJECTION, SOLUTION INTRAMUSCULAR; INTRAVENOUS
Status: DISCONTINUED | OUTPATIENT
Start: 2023-11-02 | End: 2023-11-02 | Stop reason: HOSPADM

## 2023-11-02 RX ORDER — LIDOCAINE HYDROCHLORIDE AND EPINEPHRINE 10; 10 MG/ML; UG/ML
INJECTION, SOLUTION INFILTRATION; PERINEURAL AS NEEDED
Status: DISCONTINUED | OUTPATIENT
Start: 2023-11-02 | End: 2023-11-02 | Stop reason: HOSPADM

## 2023-11-02 RX ORDER — SODIUM CHLORIDE 0.9 % (FLUSH) 0.9 %
3-10 SYRINGE (ML) INJECTION AS NEEDED
Status: DISCONTINUED | OUTPATIENT
Start: 2023-11-02 | End: 2023-11-02 | Stop reason: HOSPADM

## 2023-11-02 RX ORDER — FLUMAZENIL 0.1 MG/ML
0.2 INJECTION INTRAVENOUS AS NEEDED
Status: DISCONTINUED | OUTPATIENT
Start: 2023-11-02 | End: 2023-11-02 | Stop reason: HOSPADM

## 2023-11-02 RX ORDER — LIDOCAINE HYDROCHLORIDE 20 MG/ML
INJECTION, SOLUTION EPIDURAL; INFILTRATION; INTRACAUDAL; PERINEURAL AS NEEDED
Status: DISCONTINUED | OUTPATIENT
Start: 2023-11-02 | End: 2023-11-02 | Stop reason: SURG

## 2023-11-02 RX ORDER — NALOXONE HCL 0.4 MG/ML
0.2 VIAL (ML) INJECTION AS NEEDED
Status: DISCONTINUED | OUTPATIENT
Start: 2023-11-02 | End: 2023-11-02 | Stop reason: HOSPADM

## 2023-11-02 RX ORDER — FAMOTIDINE 10 MG/ML
20 INJECTION, SOLUTION INTRAVENOUS ONCE
Status: COMPLETED | OUTPATIENT
Start: 2023-11-02 | End: 2023-11-02

## 2023-11-02 RX ORDER — PROMETHAZINE HYDROCHLORIDE 25 MG/1
25 SUPPOSITORY RECTAL ONCE AS NEEDED
Status: DISCONTINUED | OUTPATIENT
Start: 2023-11-02 | End: 2023-11-02 | Stop reason: HOSPADM

## 2023-11-02 RX ORDER — HYDROCODONE BITARTRATE AND ACETAMINOPHEN 7.5; 325 MG/1; MG/1
1 TABLET ORAL EVERY 4 HOURS PRN
Qty: 12 TABLET | Refills: 0 | Status: SHIPPED | OUTPATIENT
Start: 2023-11-02

## 2023-11-02 RX ORDER — DEXAMETHASONE SODIUM PHOSPHATE 4 MG/ML
INJECTION, SOLUTION INTRA-ARTICULAR; INTRALESIONAL; INTRAMUSCULAR; INTRAVENOUS; SOFT TISSUE AS NEEDED
Status: DISCONTINUED | OUTPATIENT
Start: 2023-11-02 | End: 2023-11-02 | Stop reason: SURG

## 2023-11-02 RX ORDER — METOPROLOL TARTRATE 5 MG/5ML
INJECTION INTRAVENOUS AS NEEDED
Status: DISCONTINUED | OUTPATIENT
Start: 2023-11-02 | End: 2023-11-02 | Stop reason: SURG

## 2023-11-02 RX ORDER — SODIUM CHLORIDE 0.9 % (FLUSH) 0.9 %
3 SYRINGE (ML) INJECTION EVERY 12 HOURS SCHEDULED
Status: DISCONTINUED | OUTPATIENT
Start: 2023-11-02 | End: 2023-11-02 | Stop reason: HOSPADM

## 2023-11-02 RX ORDER — ONDANSETRON 2 MG/ML
4 INJECTION INTRAMUSCULAR; INTRAVENOUS ONCE AS NEEDED
Status: DISCONTINUED | OUTPATIENT
Start: 2023-11-02 | End: 2023-11-02 | Stop reason: HOSPADM

## 2023-11-02 RX ORDER — SODIUM CHLORIDE, SODIUM LACTATE, POTASSIUM CHLORIDE, CALCIUM CHLORIDE 600; 310; 30; 20 MG/100ML; MG/100ML; MG/100ML; MG/100ML
9 INJECTION, SOLUTION INTRAVENOUS CONTINUOUS
Status: DISCONTINUED | OUTPATIENT
Start: 2023-11-02 | End: 2023-11-02 | Stop reason: HOSPADM

## 2023-11-02 RX ORDER — MIDAZOLAM HYDROCHLORIDE 1 MG/ML
1 INJECTION INTRAMUSCULAR; INTRAVENOUS
Status: DISCONTINUED | OUTPATIENT
Start: 2023-11-02 | End: 2023-11-02 | Stop reason: HOSPADM

## 2023-11-02 RX ORDER — SODIUM CHLORIDE 9 MG/ML
INJECTION, SOLUTION INTRAVENOUS CONTINUOUS PRN
Status: COMPLETED | OUTPATIENT
Start: 2023-11-02 | End: 2023-11-02

## 2023-11-02 RX ORDER — PROPOFOL 10 MG/ML
VIAL (ML) INTRAVENOUS AS NEEDED
Status: DISCONTINUED | OUTPATIENT
Start: 2023-11-02 | End: 2023-11-02 | Stop reason: SURG

## 2023-11-02 RX ORDER — EPHEDRINE SULFATE 50 MG/ML
INJECTION INTRAVENOUS AS NEEDED
Status: DISCONTINUED | OUTPATIENT
Start: 2023-11-02 | End: 2023-11-02 | Stop reason: SURG

## 2023-11-02 RX ORDER — ROCURONIUM BROMIDE 10 MG/ML
INJECTION, SOLUTION INTRAVENOUS AS NEEDED
Status: DISCONTINUED | OUTPATIENT
Start: 2023-11-02 | End: 2023-11-02 | Stop reason: SURG

## 2023-11-02 RX ORDER — PROMETHAZINE HYDROCHLORIDE 12.5 MG/1
25 TABLET ORAL ONCE AS NEEDED
Status: DISCONTINUED | OUTPATIENT
Start: 2023-11-02 | End: 2023-11-02 | Stop reason: HOSPADM

## 2023-11-02 RX ORDER — FENTANYL CITRATE 0.05 MG/ML
INJECTION, SOLUTION INTRAMUSCULAR; INTRAVENOUS AS NEEDED
Status: DISCONTINUED | OUTPATIENT
Start: 2023-11-02 | End: 2023-11-02 | Stop reason: SURG

## 2023-11-02 RX ORDER — MAGNESIUM HYDROXIDE 1200 MG/15ML
LIQUID ORAL AS NEEDED
Status: DISCONTINUED | OUTPATIENT
Start: 2023-11-02 | End: 2023-11-02 | Stop reason: HOSPADM

## 2023-11-02 RX ADMIN — HYDROCODONE BITARTRATE AND ACETAMINOPHEN 1 TABLET: 7.5; 325 TABLET ORAL at 15:39

## 2023-11-02 RX ADMIN — FAMOTIDINE 20 MG: 10 INJECTION INTRAVENOUS at 11:28

## 2023-11-02 RX ADMIN — ROCURONIUM BROMIDE 10 MG: 50 INJECTION INTRAVENOUS at 13:33

## 2023-11-02 RX ADMIN — ONDANSETRON 4 MG: 2 INJECTION INTRAMUSCULAR; INTRAVENOUS at 13:58

## 2023-11-02 RX ADMIN — FENTANYL CITRATE 25 MCG: 0.05 INJECTION, SOLUTION INTRAMUSCULAR; INTRAVENOUS at 13:59

## 2023-11-02 RX ADMIN — MIDAZOLAM 1 MG: 1 INJECTION INTRAMUSCULAR; INTRAVENOUS at 11:28

## 2023-11-02 RX ADMIN — DROPERIDOL 0.62 MG: 2.5 INJECTION, SOLUTION INTRAMUSCULAR; INTRAVENOUS at 12:49

## 2023-11-02 RX ADMIN — FENTANYL CITRATE 50 MCG: 0.05 INJECTION, SOLUTION INTRAMUSCULAR; INTRAVENOUS at 14:33

## 2023-11-02 RX ADMIN — SODIUM CHLORIDE, POTASSIUM CHLORIDE, SODIUM LACTATE AND CALCIUM CHLORIDE: 600; 310; 30; 20 INJECTION, SOLUTION INTRAVENOUS at 13:32

## 2023-11-02 RX ADMIN — DEXAMETHASONE SODIUM PHOSPHATE 12 MG: 4 INJECTION, SOLUTION INTRAMUSCULAR; INTRAVENOUS at 12:42

## 2023-11-02 RX ADMIN — NASAL DECONGESTANT 2 SPRAY: 0.05 SPRAY NASAL at 11:18

## 2023-11-02 RX ADMIN — PROPOFOL 130 MG: 10 INJECTION, EMULSION INTRAVENOUS at 12:34

## 2023-11-02 RX ADMIN — LIDOCAINE HYDROCHLORIDE 60 MG: 20 INJECTION, SOLUTION EPIDURAL; INFILTRATION; INTRACAUDAL; PERINEURAL at 12:34

## 2023-11-02 RX ADMIN — NASAL DECONGESTANT 2 SPRAY: 0.05 SPRAY NASAL at 12:05

## 2023-11-02 RX ADMIN — FENTANYL CITRATE 25 MCG: 0.05 INJECTION, SOLUTION INTRAMUSCULAR; INTRAVENOUS at 12:33

## 2023-11-02 RX ADMIN — SUGAMMADEX 200 MG: 100 INJECTION, SOLUTION INTRAVENOUS at 13:55

## 2023-11-02 RX ADMIN — EPHEDRINE SULFATE 10 MG: 50 INJECTION, SOLUTION INTRAVENOUS at 13:03

## 2023-11-02 RX ADMIN — METOPROLOL TARTRATE 3 MG: 5 INJECTION INTRAVENOUS at 12:55

## 2023-11-02 RX ADMIN — ROCURONIUM BROMIDE 30 MG: 50 INJECTION INTRAVENOUS at 12:34

## 2023-11-02 RX ADMIN — SODIUM CHLORIDE, POTASSIUM CHLORIDE, SODIUM LACTATE AND CALCIUM CHLORIDE 9 ML/HR: 600; 310; 30; 20 INJECTION, SOLUTION INTRAVENOUS at 11:23

## 2023-11-02 NOTE — DISCHARGE INSTRUCTIONS
Dr Franklin / Dr Benjamin Septoplasty Discharge Instructions            Today, you will be given the following instructions to help you and your family prepare for your discharge.  You are a very important part of your recovery.  Ask your Nurse or Physician any     Questions.      Septoplasty Discharge Instructions :            *  Your surgery will repair the bones or cartilage in your nose to relieve nasal obstruction.             *  Expect pain and swelling to slowly decrease over the next week.    You will be able to breath through your nose and your sense of smell will return after the swelling goes down.        Follow these instructions:              1.  Keep the head of your bed elevated to help the discharge            2.  AVOID strenuous activity for the next 3-4 days              3.  Do NOT blow your nose until okay with your doctor            4.  Change the nasal drip as needed            5.  Put ice packs on your face to keep swelling down            6.  Use a cool mist vaporizer in your house to humidify air            7.  Sneeze with your mouth open            8.  Avoid smoke and irritants            9.  Take your medicine as instructed        Call your doctor if you have:            *  Foul smell from drainage            *  Chills or fever over 101 degrees by mouth            *  Pain not helped by your pain medicine            *  Trouble or pain with breathing            *  Any problems or concerns        Dr Franklin and Dr Benjamin  [phone]

## 2023-11-02 NOTE — H&P
Patient Care Team:  Avery Armstrong MD as PCP - General (Internal Medicine)    Chief complaint I am sick all the time    Subjective     Patient is a 63 y.o. female presents with symptoms of chronic rhinosinusitis including congestion, postnasal drainage, and cycles of sinusitis. Onset of symptoms was gradual starting several months ago.  Symptoms are associated with mid facial pressure and chronic drainage.  Symptoms are aggravated by upper respiratory illness.   Symptoms improve with antibiotics and steroids. Severity severe context overall respiratory health quality not applicable    Review of Systems   Pertinent items are noted in HPI, all other systems reviewed and negative    History  Past Medical History:   Diagnosis Date    Fibromyalgia     Gastroesophageal reflux disease without esophagitis     Hernia, hiatal     High cholesterol     IBS (irritable bowel syndrome)     Other hyperlipidemia     Reactive depression      Past Surgical History:   Procedure Laterality Date    COLONOSCOPY      COLONOSCOPY N/A 9/15/2023    Procedure: COLONOSCOPY to Cecum;  Surgeon: Joe Del Cid MD;  [place] [facility] [place];  Service: Gastroenterology;  Laterality: N/A;  Melanosis, Diverticulosis    LAPAROSCOPIC CHOLECYSTECTOMY      TONSILLECTOMY       Family History   Problem Relation Age of Onset    Hypertension Mother     Hyperlipidemia Mother     Hypertension Father     Hyperlipidemia Father     Stroke Father     Cancer Sister     Aneurysm Maternal Uncle     Heart disease Maternal Grandmother     Colon cancer Neg Hx     Colon polyps Neg Hx      Social History     Tobacco Use    Smoking status: Never     Passive exposure: Never    Smokeless tobacco: Never    Tobacco comments:     caffeine use   Vaping Use    Vaping Use: Never used   Substance Use Topics    Alcohol use: No    Drug use: No     E-cigarette/Vaping    E-cigarette/Vaping Use Never User     Passive Exposure No     Counseling Given No       E-cigarette/Vaping Substances    Nicotine No     THC No     CBD No     Flavoring No      Medications Prior to Admission   Medication Sig Dispense Refill Last Dose    albuterol sulfate  (90 Base) MCG/ACT inhaler    11/1/2023    budesonide-formoterol (Symbicort) 80-4.5 MCG/ACT inhaler Inhale 2 puffs 2 (Two) Times a Day. 1 each 0 Past Month    DULoxetine (CYMBALTA) 30 MG capsule Take 1 capsule by mouth Daily. 90 capsule 3 11/2/2023    DULoxetine (CYMBALTA) 60 MG capsule Take 1 capsule by mouth Daily. 90 capsule 3 11/2/2023    famotidine (PEPCID) 40 MG tablet TAKE ONE TABLET BY MOUTH TWICE A  tablet 3 11/2/2023    fluticasone (FLONASE) 50 MCG/ACT nasal spray 2 sprays into the nostril(s) as directed by provider Daily. 16 g 0 11/1/2023    gabapentin (NEURONTIN) 300 MG capsule Take 1 capsule by mouth 2 (Two) Times a Day. 60 capsule 3 11/2/2023    linaclotide (LINZESS) 290 MCG capsule capsule Take 1 capsule by mouth Every Morning Before Breakfast. 90 capsule 3 Past Week    pantoprazole (PROTONIX) 40 MG EC tablet Take 1 tablet by mouth 2 (Two) Times a Day. 180 tablet 3 11/2/2023    Pitavastatin Calcium (Livalo) 4 MG tablet Take 1 tablet by mouth Every Night. 30 tablet 2 11/1/2023    sucralfate (Carafate) 1 g tablet Take 1 tablet by mouth every night at bedtime. If cant swallow, may cut or Crush tablet, 30 min after other meds, before bedtime. 90 tablet 3 11/1/2023    Xiidra 5 % ophthalmic solution    11/1/2023     Allergies:  Atorvastatin, Codeine, Crestor [rosuvastatin calcium], Simvastatin, and Sulfa antibiotics    Objective     Vital Signs  Temp:  [97 °F (36.1 °C)-98.7 °F (37.1 °C)] 98 °F (36.7 °C)  Heart Rate:  [61-84] 67  Resp:  [16-20] 18  BP: ()/(41-92) 140/73    Physical Exam:    No exam performed today,    Results Review:    None    Assessment & Plan       * No active hospital problems. *      Impression: Chronic rhinosinusitis    Plan: Bilateral endoscopic sinus surgery    I discussed the  patients findings and my recommendations with patient.     Stephen Franklin MD  11/02/23  15:40 EDT    Time: 10 minutes

## 2023-11-02 NOTE — NURSING NOTE
"Pt's B/P low-70s/40s and HR dropping to 50s.  Pt noted to be hot to touch and clammy.  Pt admitted she \"felt warm.\"  Afebrile.  Dr. Reddy notified and at BSD observed pt and stated pt \"vageled.\"  RN opened IVF wide up and B/P began to normalize instantly, as well as HR.  Pt is noticeabely not clammy now.  Pt states that she feels \"much better now.\"  B/P continues to normalize.    "

## 2023-11-02 NOTE — OP NOTE
Preop diagnosis: Chronic rhinosinusitis with polyps    Postop diagnosis: Same    Procedure: Bilateral endoscopic anterior posterior ethmoidectomy; bilateral endoscopic maxillary antrostomy with removal of tissue; bilateral endoscopic nasofrontal duct dissection    Surgeon: Rigoberto    Specimen: Ethmoid content    Blood loss: 15 mL    Complications: None    Description of procedure: Patient was placed supine on the operating table general anesthetic was administered via oral endotracheal tube.  Patient was positioned draped need made for nasal surgery.    Both nasal cavities were decongested allowing inspection with a rigid endoscope.  Scope was advanced on the left.  The turbinate was quite enlarged consistent with thais bullosa.  I injected the middle turbinate with 1% lidocaine 1 200,000 epinephrine.    I incised the middle turbinate from posterior to anterior with a sickle knife.  I resected the lateral half of the turbinate thais bullosa without difficulty.  Thick allergic mucin filled the middle meatus and poured forth from the maxillary sinus.  Small polyps were present along the uncinated.  This tissue was injected with 1% lidocaine 100,000 epinephrine.    I reflected the uncinated forward with a ball-tipped probe and removed it with backbiting forceps and the microdebrider.  The natural ostium of the maxillary sinus was severely edematous but eventually identified and widened approximately 12 mm diameter.  The anterior the sinus was flushed of all thick allergic mucin and polyp tissue was removed as well.    The ethmoid bulla was then taken down with the microdebrider and thorough anterior posterior ethmoidectomy was completed.  The space was filled with severely diseased polypoid tissue and allergic mucin.    Similar procedure was completed on the contralateral side although we did not need to partially resect middle turbinate.    I completed anterior posterior ethmoidectomy, max antrostomy with removal of  tissue, and nasofrontal duct dissection.  Frontal duct was also dissected on the left.  Olive-tipped suction was submitted into the frontal sinus bilaterally both for irrigation and evacuation of fluid.    Once all these dissections were complete I placed NasoPore into the middle meatus bilaterally.  I placed a larger pack along the floor the nose.  Drip pad was applied.  Patient was awake and returned to recovery.

## 2023-11-02 NOTE — ANESTHESIA PREPROCEDURE EVALUATION
Anesthesia Evaluation     Patient summary reviewed and Nursing notes reviewed   NPO Solid Status: > 8 hours  NPO Liquid Status: > 2 hours           Airway   Mallampati: II  TM distance: >3 FB  Neck ROM: full  no difficulty expected  Dental - normal exam     Pulmonary - normal exam   (-) COPD, asthma, not a smoker, lung cancer  Cardiovascular - normal exam  Exercise tolerance: good (4-7 METS)    Rhythm: regular  Rate: normal    (+) hyperlipidemia  (-) hypertension, valvular problems/murmurs, past MI, CAD, dysrhythmias, angina, CHF, cardiac stents, CABG, pericardial effusion      Neuro/Psych  (+) psychiatric history Depression  (-) seizures, TIA, CVA  GI/Hepatic/Renal/Endo    (+) hiatal hernia, GERD well controlled  (-) PUD, hepatitis, liver disease, no renal disease, diabetes, GI bleed, no thyroid disorder    Musculoskeletal     Abdominal  - normal exam   Substance History      OB/GYN          Other   autoimmune disease ,       Other Comment: fibromyalgia              Anesthesia Plan    ASA 2     general     intravenous induction     Anesthetic plan, risks, benefits, and alternatives have been provided, discussed and informed consent has been obtained with: patient.    CODE STATUS:

## 2023-11-02 NOTE — ANESTHESIA POSTPROCEDURE EVALUATION
"Patient: Esther Duke    Procedure Summary       Date: 11/02/23 Room / Location: [facility]    Anesthesia Start: 1229 Anesthesia Stop: 1412    Procedures:       Bilateral maxillary antrostomy, Bilateral total ethmoidectomy with frontal sinus exploration (Bilateral)      ENDOSCOPIC FUNCTIONAL SINUS SURGERY (Bilateral: Nose) Diagnosis:       Chronic ethmoidal sinusitis      (Chronic ethmoidal sinusitis [J32.2])    Surgeons: Stephen Franklin MD Provider: Paolo Reddy MD    Anesthesia Type: general ASA Status: 2            Anesthesia Type: general    Vitals  Vitals Value Taken Time   /80 11/02/23 1445   Temp 36.7 °C (98 °F) 11/02/23 1445   Pulse 72 11/02/23 1445   Resp 18 11/02/23 1445   SpO2 97 % 11/02/23 1445           Post Anesthesia Care and Evaluation    Patient location during evaluation: PACU  Patient participation: complete - patient participated  Level of consciousness: awake and alert  Pain score: 0  Pain management: adequate    Airway patency: patent  Anesthetic complications: No anesthetic complications    Cardiovascular status: acceptable  Respiratory status: acceptable  Hydration status: acceptable    Comments: /80 (BP Location: Left arm, Patient Position: Lying)   Pulse 72   Temp 36.7 °C (98 °F) (Temporal)   Resp 18   Ht 162.6 cm (64\")   Wt 72.6 kg (160 lb)   SpO2 97%   BMI 27.46 kg/m²     "

## 2023-11-02 NOTE — ANESTHESIA PROCEDURE NOTES
Airway  Urgency: elective    Date/Time: 11/2/2023 12:38 PM  Airway not difficult    General Information and Staff    Patient location during procedure: OR  Anesthesiologist: Paolo Reddy MD  CRNA/CAA: Xochilt Kwok CRNA    Indications and Patient Condition  Indications for airway management: airway protection    Preoxygenated: yes  Mask difficulty assessment: 2 - vent by mask + OA or adjuvant +/- NMBA    Final Airway Details  Final airway type: endotracheal airway      Successful airway: ETT  Cuffed: yes   Successful intubation technique: direct laryngoscopy  Facilitating devices/methods: intubating stylet and cricoid pressure  Endotracheal tube insertion site: oral  Blade: Ruthann  Blade size: 3  ETT size (mm): 7.0  Cormack-Lehane Classification: grade IIa - partial view of glottis  Placement verified by: chest auscultation and capnometry   Inital cuff pressure (cm H2O): 19  Cuff volume (mL): 8  Measured from: lips  Number of attempts at approach: 2  Assessment: lips, teeth, and gum same as pre-op and atraumatic intubation    Additional Comments  Unable to obtain good intubation angle without stylet

## 2023-11-04 LAB
BACTERIA SPEC AEROBE CULT: NORMAL
GRAM STN SPEC: NORMAL
GRAM STN SPEC: NORMAL

## 2023-11-05 LAB — BACTERIA SPEC ANAEROBE CULT: NORMAL

## 2023-11-07 LAB
LAB AP CASE REPORT: NORMAL
LAB AP CLINICAL INFORMATION: NORMAL
LAB AP SPECIAL STAINS: NORMAL
PATH REPORT.FINAL DX SPEC: NORMAL
PATH REPORT.GROSS SPEC: NORMAL

## 2023-11-09 LAB
BACTERIA SPEC ANAEROBE CULT: NORMAL
FUNGUS WND CULT: NORMAL

## 2023-11-16 LAB — FUNGUS WND CULT: NORMAL

## 2023-11-22 ENCOUNTER — LAB (OUTPATIENT)
Dept: LAB | Facility: HOSPITAL | Age: 63
End: 2023-11-22
Payer: COMMERCIAL

## 2023-11-22 ENCOUNTER — OFFICE VISIT (OUTPATIENT)
Dept: GASTROENTEROLOGY | Facility: CLINIC | Age: 63
End: 2023-11-22
Payer: COMMERCIAL

## 2023-11-22 VITALS
WEIGHT: 164.6 LBS | DIASTOLIC BLOOD PRESSURE: 74 MMHG | SYSTOLIC BLOOD PRESSURE: 116 MMHG | BODY MASS INDEX: 28.1 KG/M2 | HEIGHT: 64 IN

## 2023-11-22 DIAGNOSIS — Z86.19 HISTORY OF HELICOBACTER PYLORI INFECTION: ICD-10-CM

## 2023-11-22 DIAGNOSIS — K21.00 GASTROESOPHAGEAL REFLUX DISEASE WITH ESOPHAGITIS WITHOUT HEMORRHAGE: ICD-10-CM

## 2023-11-22 DIAGNOSIS — K58.1 IRRITABLE BOWEL SYNDROME WITH CONSTIPATION: Primary | ICD-10-CM

## 2023-11-22 LAB
ALBUMIN SERPL-MCNC: 3.9 G/DL (ref 3.5–5.2)
ALBUMIN/GLOB SERPL: 1.4 G/DL
ALP SERPL-CCNC: 83 U/L (ref 39–117)
ALT SERPL W P-5'-P-CCNC: 11 U/L (ref 1–33)
ANION GAP SERPL CALCULATED.3IONS-SCNC: 8.2 MMOL/L (ref 5–15)
AST SERPL-CCNC: 15 U/L (ref 1–32)
BASOPHILS # BLD AUTO: 0.06 10*3/MM3 (ref 0–0.2)
BASOPHILS NFR BLD AUTO: 1.1 % (ref 0–1.5)
BILIRUB SERPL-MCNC: <0.2 MG/DL (ref 0–1.2)
BUN SERPL-MCNC: 8 MG/DL (ref 8–23)
BUN/CREAT SERPL: 11.6 (ref 7–25)
CALCIUM SPEC-SCNC: 9.5 MG/DL (ref 8.6–10.5)
CHLORIDE SERPL-SCNC: 103 MMOL/L (ref 98–107)
CHOLEST SERPL-MCNC: 209 MG/DL (ref 0–200)
CK SERPL-CCNC: 57 U/L (ref 20–180)
CO2 SERPL-SCNC: 26.8 MMOL/L (ref 22–29)
CREAT SERPL-MCNC: 0.69 MG/DL (ref 0.57–1)
DEPRECATED RDW RBC AUTO: 44.3 FL (ref 37–54)
EGFRCR SERPLBLD CKD-EPI 2021: 98.3 ML/MIN/1.73
EOSINOPHIL # BLD AUTO: 0.78 10*3/MM3 (ref 0–0.4)
EOSINOPHIL NFR BLD AUTO: 14.9 % (ref 0.3–6.2)
ERYTHROCYTE [DISTWIDTH] IN BLOOD BY AUTOMATED COUNT: 13.8 % (ref 12.3–15.4)
GLOBULIN UR ELPH-MCNC: 2.7 GM/DL
GLUCOSE SERPL-MCNC: 72 MG/DL (ref 65–99)
HCT VFR BLD AUTO: 39.2 % (ref 34–46.6)
HDLC SERPL QL: 2.94
HDLC SERPL-MCNC: 71 MG/DL (ref 40–60)
HGB BLD-MCNC: 12.5 G/DL (ref 12–15.9)
IMM GRANULOCYTES # BLD AUTO: 0.02 10*3/MM3 (ref 0–0.05)
IMM GRANULOCYTES NFR BLD AUTO: 0.4 % (ref 0–0.5)
LDLC SERPL CALC-MCNC: 121 MG/DL (ref 0–100)
LYMPHOCYTES # BLD AUTO: 1.54 10*3/MM3 (ref 0.7–3.1)
LYMPHOCYTES NFR BLD AUTO: 29.4 % (ref 19.6–45.3)
MCH RBC QN AUTO: 27.7 PG (ref 26.6–33)
MCHC RBC AUTO-ENTMCNC: 31.9 G/DL (ref 31.5–35.7)
MCV RBC AUTO: 86.9 FL (ref 79–97)
MONOCYTES # BLD AUTO: 0.51 10*3/MM3 (ref 0.1–0.9)
MONOCYTES NFR BLD AUTO: 9.7 % (ref 5–12)
NEUTROPHILS NFR BLD AUTO: 2.33 10*3/MM3 (ref 1.7–7)
NEUTROPHILS NFR BLD AUTO: 44.5 % (ref 42.7–76)
NRBC BLD AUTO-RTO: 0 /100 WBC (ref 0–0.2)
PLATELET # BLD AUTO: 389 10*3/MM3 (ref 140–450)
PMV BLD AUTO: 10.1 FL (ref 6–12)
POTASSIUM SERPL-SCNC: 4.1 MMOL/L (ref 3.5–5.2)
PROT SERPL-MCNC: 6.6 G/DL (ref 6–8.5)
RBC # BLD AUTO: 4.51 10*6/MM3 (ref 3.77–5.28)
SODIUM SERPL-SCNC: 138 MMOL/L (ref 136–145)
TRIGL SERPL-MCNC: 97 MG/DL (ref 0–150)
TSH SERPL DL<=0.05 MIU/L-ACNC: 1.98 UIU/ML (ref 0.27–4.2)
VLDLC SERPL-MCNC: 17 MG/DL (ref 5–40)
WBC NRBC COR # BLD AUTO: 5.24 10*3/MM3 (ref 3.4–10.8)

## 2023-11-22 PROCEDURE — 82550 ASSAY OF CK (CPK): CPT | Performed by: INTERNAL MEDICINE

## 2023-11-22 PROCEDURE — 80050 GENERAL HEALTH PANEL: CPT | Performed by: INTERNAL MEDICINE

## 2023-11-22 PROCEDURE — 80061 LIPID PANEL: CPT | Performed by: INTERNAL MEDICINE

## 2023-11-22 NOTE — PROGRESS NOTES
PATIENT INFORMATION  Esther Duke       - 1960    CHIEF COMPLAINT  Chief Complaint   Patient presents with    Irritable Bowel Syndrome    Diverticulosis    Heartburn       HISTORY OF PRESENT ILLNESS  Here todaay for IBS and GERD    IBS-C: Has been on daily linzess and after colon was asked to do miralax at night. Has been doing senna at night pretty regularly, reviewed weaning off stimulants. TODAY: Doing better on Linzess 290, but taking senna 3 times a week, but knows she needs to get off. Daily BM, takes senna if she is bloated and L sided cramping when needs BM, feels full. Will start miralax at night and linzess in evening and stop senna.     GERD: TODAY: Doing well on on reflux meds, breakthrough if eats late, but avoids doing so. Otherwise doing pretty well. Feeling better since HP treatment. Pepcid PRN, uncommon.      Issues with sinuses recently, coughing up phlegm in the mornings. Seeing ENT tomorrow. May repeat EGD, but will await his exam.      9/15/2023 Last Colon with mild melanosis, diverticulosis.      Last EGD 2019 with chemical gastritis, normal small bowel, reflux esophagitis, negative for Barretts, HP.      Irritable Bowel Syndrome  Associated symptoms include abdominal pain and coughing (only in AM). Pertinent negatives include no nausea or vomiting.   Heartburn  She complains of abdominal pain and coughing (only in AM). She reports no nausea.       REVIEWED PERTINENT RESULTS/ LABS  Lab Results   Component Value Date    CASEREPORT  2023     Surgical Pathology Report                         Case: [id number]                                  Authorizing Provider:  Stepehn Franklin MD  Collected:           2023 01:14 PM          Ordering Location:     [facility]     Received:            2023 03:27 PM                                 [facility] OR                                                     Pathologist:           Marge Walker  MD Justino                                                    Specimen:    Sinus, bilateral sinus contents                                                            FINALDX  11/02/2023     1.  Bilateral sinus contents:   -Chronic sinusitis.    -Negative for fungal organisms by GMS special stain.   -No metaplasia nor dysplasia identified.    [facility]       Lab Results   Component Value Date    HGB 13.4 06/15/2023    MCV 85.3 06/15/2023     06/15/2023    ALT 14 06/15/2023    AST 15 06/15/2023    TRIG 96 06/15/2023      No results found.    REVIEW OF SYSTEMS  Review of Systems   Constitutional: Negative.    HENT: Negative.     Eyes: Negative.    Respiratory:  Positive for cough (only in AM).    Cardiovascular: Negative.    Gastrointestinal:  Positive for abdominal pain and constipation. Negative for diarrhea, nausea and vomiting.        IBS, GERD, Diverticulosis   Endocrine: Negative.    Genitourinary: Negative.    Musculoskeletal: Negative.    Skin: Negative.    Allergic/Immunologic: Negative.    Neurological: Negative.    Hematological: Negative.    Psychiatric/Behavioral: Negative.           ACTIVE PROBLEMS  Patient Active Problem List    Diagnosis     Irritable bowel syndrome with constipation [K58.1]     Gastroesophageal reflux disease with esophagitis without hemorrhage [K21.00]     Chronic foot pain, right [M79.671, G89.29]     Fibromyalgia [M79.7]     Other hyperlipidemia [E78.49]     Reactive depression [F32.9]          PAST MEDICAL HISTORY  Past Medical History:   Diagnosis Date    Fibromyalgia     Gastroesophageal reflux disease without esophagitis     Hernia, hiatal     High cholesterol     IBS (irritable bowel syndrome)     Other hyperlipidemia     Reactive depression          SURGICAL HISTORY  Past Surgical History:   Procedure Laterality Date    ANTROSTOMY Bilateral 11/2/2023    Procedure: Bilateral maxillary antrostomy, Bilateral total ethmoidectomy with frontal sinus exploration;  Surgeon: Rigoberto  Stephen Tanner MD;  [place] [facility] [place]  Service: ENT;  Laterality: Bilateral;    COLONOSCOPY      COLONOSCOPY N/A 9/15/2023    Procedure: COLONOSCOPY to Cecum;  Surgeon: Joe Del Cid MD;  [place] [facility];  Service: Gastroenterology;  Laterality: N/A;  Melanosis, Diverticulosis    ENDOSCOPIC FUNCTIONAL SINUS SURGERY (FESS) Bilateral 11/2/2023    Procedure: ENDOSCOPIC FUNCTIONAL SINUS SURGERY;  Surgeon: Stephen Franklin MD;  [place] [facility];  Service: ENT;  Laterality: Bilateral;    LAPAROSCOPIC CHOLECYSTECTOMY      TONSILLECTOMY           FAMILY HISTORY  Family History   Problem Relation Age of Onset    Hypertension Mother     Hyperlipidemia Mother     Hypertension Father     Hyperlipidemia Father     Stroke Father     Cancer Sister     Aneurysm Maternal Uncle     Heart disease Maternal Grandmother     Colon cancer Neg Hx     Colon polyps Neg Hx          SOCIAL HISTORY  Social History     Occupational History    Not on file   Tobacco Use    Smoking status: Never     Passive exposure: Never    Smokeless tobacco: Never    Tobacco comments:     caffeine use   Vaping Use    Vaping Use: Never used   Substance and Sexual Activity    Alcohol use: No    Drug use: No    Sexual activity: Defer         CURRENT MEDICATIONS    Current Outpatient Medications:     albuterol sulfate  (90 Base) MCG/ACT inhaler, , Disp: , Rfl:     budesonide-formoterol (Symbicort) 80-4.5 MCG/ACT inhaler, Inhale 2 puffs 2 (Two) Times a Day., Disp: 1 each, Rfl: 0    DULoxetine (CYMBALTA) 30 MG capsule, Take 1 capsule by mouth Daily., Disp: 90 capsule, Rfl: 3    DULoxetine (CYMBALTA) 60 MG capsule, Take 1 capsule by mouth Daily., Disp: 90 capsule, Rfl: 3    famotidine (PEPCID) 40 MG tablet, TAKE ONE TABLET BY MOUTH TWICE A DAY, Disp: 180 tablet, Rfl: 3    gabapentin (NEURONTIN) 300 MG capsule, Take 1 capsule by mouth 2 (Two) Times a Day., Disp: 60 capsule, Rfl: 3    HYDROcodone-acetaminophen (NORCO)  "7.5-325 MG per tablet, Take 1 tablet by mouth Every 4 (Four) Hours As Needed for Moderate Pain (Pain)., Disp: 12 tablet, Rfl: 0    linaclotide (LINZESS) 290 MCG capsule capsule, Take 1 capsule by mouth Every Morning Before Breakfast., Disp: 90 capsule, Rfl: 3    pantoprazole (PROTONIX) 40 MG EC tablet, Take 1 tablet by mouth 2 (Two) Times a Day., Disp: 180 tablet, Rfl: 3    Pitavastatin Calcium (Livalo) 4 MG tablet, Take 1 tablet by mouth Every Night., Disp: 30 tablet, Rfl: 2    sucralfate (Carafate) 1 g tablet, Take 1 tablet by mouth every night at bedtime. If cant swallow, may cut or Crush tablet, 30 min after other meds, before bedtime., Disp: 90 tablet, Rfl: 3    Xiidra 5 % ophthalmic solution, , Disp: , Rfl:     ALLERGIES  Atorvastatin, Codeine, Crestor [rosuvastatin calcium], Simvastatin, and Sulfa antibiotics    VITALS  Vitals:    11/22/23 0817   BP: 116/74   BP Location: Left arm   Patient Position: Sitting   Cuff Size: Adult   Weight: 74.7 kg (164 lb 9.6 oz)   Height: 162.6 cm (64.02\")       PHYSICAL EXAM  Debilities/Disabilities Identified: None  Emotional Behavior: Appropriate  Wt Readings from Last 3 Encounters:   11/22/23 74.7 kg (164 lb 9.6 oz)   11/02/23 72.6 kg (160 lb)   09/20/23 73 kg (161 lb)     Ht Readings from Last 1 Encounters:   11/22/23 162.6 cm (64.02\")     Body mass index is 28.24 kg/m².  Physical Exam  Constitutional:       General: She is not in acute distress.     Appearance: Normal appearance. She is not ill-appearing.   HENT:      Head: Normocephalic and atraumatic.      Mouth/Throat:      Mouth: Mucous membranes are moist.      Pharynx: No posterior oropharyngeal erythema.   Eyes:      General: No scleral icterus.  Cardiovascular:      Rate and Rhythm: Normal rate and regular rhythm.      Heart sounds: Normal heart sounds.   Pulmonary:      Effort: Pulmonary effort is normal.      Breath sounds: Normal breath sounds.   Abdominal:      General: Abdomen is flat. Bowel sounds are " normal. There is no distension.      Palpations: Abdomen is soft. There is no mass.      Tenderness: There is no abdominal tenderness. There is no guarding or rebound. Negative signs include Franklin's sign.      Hernia: No hernia is present.   Musculoskeletal:      Cervical back: Neck supple.   Skin:     General: Skin is warm.      Capillary Refill: Capillary refill takes less than 2 seconds.   Neurological:      General: No focal deficit present.      Mental Status: She is alert and oriented to person, place, and time.   Psychiatric:         Mood and Affect: Mood normal.         Behavior: Behavior normal.         Thought Content: Thought content normal.         Judgment: Judgment normal.         CLINICAL DATA REVIEWED   reviewed previous lab results and integrated with today's visit, reviewed notes from other physicians and/or last GI encounter, reviewed previous endoscopy results and available photos, reviewed surgical pathology results from previous biopsies    ASSESSMENT  Diagnoses and all orders for this visit:    Irritable bowel syndrome with constipation    History of Helicobacter pylori infection  -     H. Pylori Antigen, Stool - Stool, Per Rectum    Gastroesophageal reflux disease with esophagitis without hemorrhage          PLAN    Check HP stool for eradication  Continue PPI  Add miralax at night to linzess, wean senna    Return in about 4 months (around 3/22/2024).    I have discussed the above plan with the patient.  They verbalize understanding and are in agreement with the plan.  They have been advised to contact the office for any questions, concerns, or changes related to their health.

## 2023-11-23 LAB — FUNGUS WND CULT: NORMAL

## 2023-11-30 LAB — FUNGUS WND CULT: NORMAL

## 2023-12-08 ENCOUNTER — TELEPHONE (OUTPATIENT)
Dept: FAMILY MEDICINE CLINIC | Facility: CLINIC | Age: 63
End: 2023-12-08
Payer: COMMERCIAL

## 2023-12-08 RX ORDER — PITAVASTATIN CALCIUM 4.18 MG/1
4 TABLET, FILM COATED ORAL NIGHTLY
Qty: 30 TABLET | Refills: 2 | Status: SHIPPED | OUTPATIENT
Start: 2023-12-08

## 2023-12-08 NOTE — TELEPHONE ENCOUNTER
Caller: Esther Duke    Relationship: Self    Best call back number: [phone]    Requested Prescriptions:   Requested Prescriptions      No prescriptions requested or ordered in this encounter        Pharmacy where request should be sent: [id number]  [place] - [address] - [phone] PH - [phone] FX     Last office visit with prescribing clinician: 9/20/2023   Last telemedicine visit with prescribing clinician: Visit date not found   Next office visit with prescribing clinician: 1/10/2024     Additional details provided by patient: PATIENT STATES SHE IS CURRENTLY TAKING 4 MG TABLET   HUB DID NOT SEE MEDICATION ON LIST   PLEASE ADVISE     Does the patient have less than a 3 day supply:  [x] Yes  [] No    Would you like a call back once the refill request has been completed: [] Yes [] No    If the office needs to give you a call back, can they leave a voicemail: [] Yes [] No    Geneva Newman Rep   12/08/23 08:36 EST

## 2024-01-10 ENCOUNTER — OFFICE VISIT (OUTPATIENT)
Dept: FAMILY MEDICINE CLINIC | Facility: CLINIC | Age: 64
End: 2024-01-10
Payer: COMMERCIAL

## 2024-01-10 VITALS
HEIGHT: 64 IN | SYSTOLIC BLOOD PRESSURE: 136 MMHG | OXYGEN SATURATION: 98 % | BODY MASS INDEX: 27.49 KG/M2 | HEART RATE: 74 BPM | DIASTOLIC BLOOD PRESSURE: 80 MMHG | WEIGHT: 161 LBS | TEMPERATURE: 98.3 F

## 2024-01-10 DIAGNOSIS — M79.7 FIBROMYALGIA: ICD-10-CM

## 2024-01-10 DIAGNOSIS — J43.8 OTHER EMPHYSEMA: ICD-10-CM

## 2024-01-10 DIAGNOSIS — K21.00 GASTROESOPHAGEAL REFLUX DISEASE WITH ESOPHAGITIS WITHOUT HEMORRHAGE: ICD-10-CM

## 2024-01-10 DIAGNOSIS — F32.9 REACTIVE DEPRESSION: ICD-10-CM

## 2024-01-10 DIAGNOSIS — E78.49 OTHER HYPERLIPIDEMIA: Primary | ICD-10-CM

## 2024-01-10 PROCEDURE — 99214 OFFICE O/P EST MOD 30 MIN: CPT | Performed by: INTERNAL MEDICINE

## 2024-01-10 RX ORDER — BUDESONIDE 1 MG/2ML
INHALANT ORAL
COMMUNITY
Start: 2023-12-22

## 2024-01-10 NOTE — PROGRESS NOTES
Subjective   Esther Duke is a 63 y.o. female.     Chief Complaint   Patient presents with    Hyperlipidemia    Depression   GERD, fibromyalgia    History of Present Illness   Patient here follow-up for hyperlipidemia, depression, GERD, fibromyalgia.  Patient reports she had sinus surgery and after surgery who she was cough and congestion.  She was seen by pulmonary from [facility].  She was diagnosed with COPD on PFTs.  She was put on inhalers.  She is feeling better.  Reports her father used to smoke at home.  Patient does not smoke.   does not smoke.  The following portions of the patient's history were reviewed and updated as appropriate: allergies, current medications, past family history, past medical history, past social history, past surgical history and problem list.    Review of Systems   Constitutional:  Negative for activity change, appetite change, fatigue and fever.   Eyes:  Negative for blurred vision and double vision.   Respiratory: Negative.  Negative for shortness of breath.    Cardiovascular:  Negative for chest pain, palpitations and leg swelling.   Gastrointestinal: Negative.    Genitourinary:  Negative for hematuria.   Neurological:  Negative for dizziness, syncope, light-headedness and headache.   Psychiatric/Behavioral:  Negative for agitation, behavioral problems, decreased concentration and depressed mood.        Allergies   Allergen Reactions    Atorvastatin Myalgia    Codeine GI Intolerance    Crestor [Rosuvastatin Calcium] Myalgia    Simvastatin Myalgia    Sulfa Antibiotics Hives       Current Outpatient Medications on File Prior to Visit   Medication Sig Dispense Refill    albuterol sulfate  (90 Base) MCG/ACT inhaler       budesonide (PULMICORT) 1 MG/2ML nebulizer solution       budesonide-formoterol (Symbicort) 80-4.5 MCG/ACT inhaler Inhale 2 puffs 2 (Two) Times a Day. 1 each 0    DULoxetine (CYMBALTA) 30 MG capsule Take 1 capsule by mouth Daily. 90  capsule 3    DULoxetine (CYMBALTA) 60 MG capsule Take 1 capsule by mouth Daily. 90 capsule 3    famotidine (PEPCID) 40 MG tablet TAKE ONE TABLET BY MOUTH TWICE A  tablet 3    gabapentin (NEURONTIN) 300 MG capsule Take 1 capsule by mouth 2 (Two) Times a Day. 60 capsule 3    linaclotide (LINZESS) 290 MCG capsule capsule Take 1 capsule by mouth Every Morning Before Breakfast. 90 capsule 3    pantoprazole (PROTONIX) 40 MG EC tablet Take 1 tablet by mouth 2 (Two) Times a Day. 180 tablet 3    Pitavastatin Calcium (Livalo) 4 MG tablet Take 1 tablet by mouth Every Night. 30 tablet 2    sucralfate (Carafate) 1 g tablet Take 1 tablet by mouth every night at bedtime. If cant swallow, may cut or Crush tablet, 30 min after other meds, before bedtime. 90 tablet 3    Xiidra 5 % ophthalmic solution       HYDROcodone-acetaminophen (NORCO) 7.5-325 MG per tablet Take 1 tablet by mouth Every 4 (Four) Hours As Needed for Moderate Pain (Pain). 12 tablet 0     No current facility-administered medications on file prior to visit.       Family History   Problem Relation Age of Onset    Hypertension Mother     Hyperlipidemia Mother     Hypertension Father     Hyperlipidemia Father     Stroke Father     Cancer Sister     Aneurysm Maternal Uncle     Heart disease Maternal Grandmother     Colon cancer Neg Hx     Colon polyps Neg Hx        Past Medical History:   Diagnosis Date    COPD (chronic obstructive pulmonary disease)     Fibromyalgia     Gastroesophageal reflux disease without esophagitis     Hernia, hiatal     High cholesterol     IBS (irritable bowel syndrome)     Other hyperlipidemia     Reactive depression        Past Surgical History:   Procedure Laterality Date    ANTROSTOMY Bilateral 11/2/2023    Procedure: Bilateral maxillary antrostomy, Bilateral total ethmoidectomy with frontal sinus exploration;  Surgeon: Stephen Franklin MD;  Location: [facility] [place] OR;  Service: ENT;  Laterality: Bilateral;    COLONOSCOPY       "COLONOSCOPY N/A 9/15/2023    Procedure: COLONOSCOPY to Cecum;  Surgeon: Joe Del Cid MD;  Location: [facility] [place] [place];  Service: Gastroenterology;  Laterality: N/A;  Melanosis, Diverticulosis    ENDOSCOPIC FUNCTIONAL SINUS SURGERY (FESS) Bilateral 11/2/2023    Procedure: ENDOSCOPIC FUNCTIONAL SINUS SURGERY;  Surgeon: Stephen Franklin MD;  Location: [facility] [place] [place];  Service: ENT;  Laterality: Bilateral;    LAPAROSCOPIC CHOLECYSTECTOMY      TONSILLECTOMY         Social History     Socioeconomic History    Marital status:    Tobacco Use    Smoking status: Never     Passive exposure: Never    Smokeless tobacco: Never    Tobacco comments:     caffeine use   Vaping Use    Vaping Use: Never used   Substance and Sexual Activity    Alcohol use: No    Drug use: No    Sexual activity: Defer       Patient Active Problem List   Diagnosis    Fibromyalgia    Other hyperlipidemia    Reactive depression    Chronic foot pain, right    Irritable bowel syndrome with constipation    Gastroesophageal reflux disease with esophagitis without hemorrhage    Other emphysema       /80   Pulse 74   Temp 98.3 °F (36.8 °C)   Ht 162.6 cm (64.02\")   Wt 73 kg (161 lb)   SpO2 98%   BMI 27.62 kg/m²   Body mass index is 27.62 kg/m².    Objective   Physical Exam  Constitutional:       Appearance: She is well-developed.   Eyes:      Pupils: Pupils are equal, round, and reactive to light.   Neck:      Thyroid: No thyromegaly.      Vascular: No JVD.      Trachea: No tracheal deviation.   Cardiovascular:      Rate and Rhythm: Normal rate and regular rhythm.      Heart sounds: No murmur heard.  Pulmonary:      Effort: Pulmonary effort is normal. No respiratory distress.      Breath sounds: Normal breath sounds. No wheezing.   Abdominal:      General: Bowel sounds are normal. There is no distension.      Palpations: Abdomen is soft. There is no mass.      Tenderness: There is no abdominal tenderness. There is no right CVA " tenderness, left CVA tenderness, guarding or rebound.      Hernia: No hernia is present.   Musculoskeletal:      Cervical back: Normal range of motion and neck supple.   Lymphadenopathy:      Cervical: No cervical adenopathy.   Neurological:      General: No focal deficit present.      Mental Status: She is alert and oriented to person, place, and time. Mental status is at baseline.   Psychiatric:         Mood and Affect: Mood normal.           Assessment & Plan   Diagnoses and all orders for this visit:    1. Other hyperlipidemia (Primary)  -     CBC & Differential  -     CK  -     Comprehensive Metabolic Panel  -     Lipid Panel With / Chol / HDL Ratio  -     TSH    2. Reactive depression  -     CBC & Differential  -     CK  -     Comprehensive Metabolic Panel  -     Lipid Panel With / Chol / HDL Ratio  -     TSH    3. Fibromyalgia  -     CBC & Differential  -     CK  -     Comprehensive Metabolic Panel  -     Lipid Panel With / Chol / HDL Ratio  -     TSH    4. Gastroesophageal reflux disease with esophagitis without hemorrhage  -     CBC & Differential  -     CK  -     Comprehensive Metabolic Panel  -     Lipid Panel With / Chol / HDL Ratio  -     TSH    5. Other emphysema      Continue diet and exercise.  Matthew checked.  Continue Protonix helping her GERD.  Continue Cymbalta helping her fibromyalgia and depression.  Patient is on several inhalers.  Continue Carafate, Pepcid, Protonix helping her GERD symptoms.  She was positive for H. pylori.  Patient is also on Linzess, Livalo.  Patient return in 3 months time.  All her problems are chronic and stable.  Patient wants to go ahead and get the labs done she had labs done in November.  EHR dragon/transcription disclaimer:  Part of this note are created by electronic transcription/translation of spoken language to printed text and thus may lead to erroneous, or at times, nonsensical words or phrases inadvertently transcribed.  Although I have reviewed for such  errors, some may still exist.

## 2024-01-10 NOTE — PROGRESS NOTES
Venipuncture Blood Specimen Collection  Venipuncture performed in left arm by Sharee Ramsay MA with good hemostasis. Patient tolerated the procedure well without complications.   01/10/24   Sharee Ramsay MA

## 2024-01-11 LAB
ALBUMIN SERPL-MCNC: 4.5 G/DL (ref 3.5–5.2)
ALBUMIN/GLOB SERPL: 2 G/DL
ALP SERPL-CCNC: 81 U/L (ref 39–117)
ALT SERPL-CCNC: 11 U/L (ref 1–33)
AST SERPL-CCNC: 12 U/L (ref 1–32)
BASOPHILS # BLD AUTO: 0.08 10*3/MM3 (ref 0–0.2)
BASOPHILS # BLD MANUAL: 0.06 10*3/MM3 (ref 0–0.2)
BASOPHILS NFR BLD AUTO: 1.4 % (ref 0–1.5)
BASOPHILS NFR BLD MANUAL: 1 % (ref 0–1.5)
BILIRUB SERPL-MCNC: <0.2 MG/DL (ref 0–1.2)
BUN SERPL-MCNC: 11 MG/DL (ref 8–23)
BUN/CREAT SERPL: 14.3 (ref 7–25)
CALCIUM SERPL-MCNC: 10.1 MG/DL (ref 8.6–10.5)
CHLORIDE SERPL-SCNC: 103 MMOL/L (ref 98–107)
CHOLEST SERPL-MCNC: 215 MG/DL (ref 0–200)
CHOLEST/HDLC SERPL: 2.69 {RATIO}
CK SERPL-CCNC: 53 U/L (ref 20–180)
CO2 SERPL-SCNC: 25.4 MMOL/L (ref 22–29)
CREAT SERPL-MCNC: 0.77 MG/DL (ref 0.57–1)
DIFFERENTIAL COMMENT: ABNORMAL
EGFRCR SERPLBLD CKD-EPI 2021: 87.3 ML/MIN/1.73
EOSINOPHIL # BLD AUTO: 1.35 10*3/MM3 (ref 0–0.4)
EOSINOPHIL # BLD MANUAL: 1.22 10*3/MM3 (ref 0–0.4)
EOSINOPHIL NFR BLD AUTO: 24.3 % (ref 0.3–6.2)
EOSINOPHIL NFR BLD MANUAL: 22 % (ref 0.3–6.2)
ERYTHROCYTE [DISTWIDTH] IN BLOOD BY AUTOMATED COUNT: 13.6 % (ref 12.3–15.4)
GLOBULIN SER CALC-MCNC: 2.2 GM/DL
GLUCOSE SERPL-MCNC: 93 MG/DL (ref 65–99)
HCT VFR BLD AUTO: 39.6 % (ref 34–46.6)
HDLC SERPL-MCNC: 80 MG/DL (ref 40–60)
HGB BLD-MCNC: 13.1 G/DL (ref 12–15.9)
IMM GRANULOCYTES # BLD AUTO: 0.01 10*3/MM3 (ref 0–0.05)
IMM GRANULOCYTES NFR BLD AUTO: 0.2 % (ref 0–0.5)
LDLC SERPL CALC-MCNC: 121 MG/DL (ref 0–100)
LYMPHOCYTES # BLD AUTO: 1.36 10*3/MM3 (ref 0.7–3.1)
LYMPHOCYTES # BLD MANUAL: 1.22 10*3/MM3 (ref 0.7–3.1)
LYMPHOCYTES NFR BLD AUTO: 24.5 % (ref 19.6–45.3)
LYMPHOCYTES NFR BLD MANUAL: 22 % (ref 19.6–45.3)
MCH RBC QN AUTO: 28.1 PG (ref 26.6–33)
MCHC RBC AUTO-ENTMCNC: 33.1 G/DL (ref 31.5–35.7)
MCV RBC AUTO: 84.8 FL (ref 79–97)
MONOCYTES # BLD AUTO: 0.44 10*3/MM3 (ref 0.1–0.9)
MONOCYTES # BLD MANUAL: 0.5 10*3/MM3 (ref 0.1–0.9)
MONOCYTES NFR BLD AUTO: 7.9 % (ref 5–12)
MONOCYTES NFR BLD MANUAL: 9 % (ref 5–12)
NEUTROPHILS # BLD AUTO: 2.31 10*3/MM3 (ref 1.7–7)
NEUTROPHILS # BLD MANUAL: 2.55 10*3/MM3 (ref 1.7–7)
NEUTROPHILS NFR BLD AUTO: 41.7 % (ref 42.7–76)
NEUTROPHILS NFR BLD MANUAL: 46 % (ref 42.7–76)
PLATELET # BLD AUTO: 374 10*3/MM3 (ref 140–450)
PLATELET BLD QL SMEAR: ABNORMAL
POTASSIUM SERPL-SCNC: 4.3 MMOL/L (ref 3.5–5.2)
PROT SERPL-MCNC: 6.7 G/DL (ref 6–8.5)
RBC # BLD AUTO: 4.67 10*6/MM3 (ref 3.77–5.28)
RBC MORPH BLD: ABNORMAL
SODIUM SERPL-SCNC: 139 MMOL/L (ref 136–145)
TRIGL SERPL-MCNC: 81 MG/DL (ref 0–150)
TSH SERPL DL<=0.005 MIU/L-ACNC: 2.89 UIU/ML (ref 0.27–4.2)
VLDLC SERPL CALC-MCNC: 14 MG/DL (ref 5–40)
WBC # BLD AUTO: 5.55 10*3/MM3 (ref 3.4–10.8)

## 2024-01-24 ENCOUNTER — LAB (OUTPATIENT)
Dept: LAB | Facility: HOSPITAL | Age: 64
End: 2024-01-24
Payer: COMMERCIAL

## 2024-01-24 PROCEDURE — 87338 HPYLORI STOOL AG IA: CPT

## 2024-01-25 LAB — H PYLORI AG STL QL IA: NEGATIVE

## 2024-02-07 DIAGNOSIS — M79.7 FIBROMYALGIA: ICD-10-CM

## 2024-02-07 NOTE — TELEPHONE ENCOUNTER
Caller: [facility] - [facility] [address] [facility] - [phone] [facility] [phone] FX    Relationship: Pharmacy    Best call back number: [phone]    Requested Prescriptions:   Requested Prescriptions     Pending Prescriptions Disp Refills    gabapentin (NEURONTIN) 300 MG capsule 60 capsule 3     Sig: Take 1 capsule by mouth 2 (Two) Times a Day.        [facility] [address] - [phone] [facility] [phone] FX     Last office visit with prescribing clinician: 1/10/2024   Last telemedicine visit with prescribing clinician: Visit date not found   Next office visit with prescribing clinician: 4/2/2024     Geneva Newman   02/07/24 12:24 EST

## 2024-02-08 RX ORDER — GABAPENTIN 300 MG/1
300 CAPSULE ORAL 2 TIMES DAILY
Qty: 60 CAPSULE | Refills: 3 | Status: SHIPPED | OUTPATIENT
Start: 2024-02-08

## 2024-02-23 ENCOUNTER — TELEPHONE (OUTPATIENT)
Dept: GASTROENTEROLOGY | Facility: CLINIC | Age: 64
End: 2024-02-23
Payer: COMMERCIAL

## 2024-03-11 RX ORDER — PITAVASTATIN CALCIUM 4.18 MG/1
1 TABLET, FILM COATED ORAL
Qty: 30 TABLET | Refills: 0 | Status: SHIPPED | OUTPATIENT
Start: 2024-03-11

## 2024-03-11 NOTE — TELEPHONE ENCOUNTER
Rx Refill Note  Requested Prescriptions     Pending Prescriptions Disp Refills    Livalo 4 MG tablet [Pharmacy Med Name: LIVALO 4 MG TABLET] 30 tablet 0     Sig: TAKE 1 TABLET BY MOUTH AT BEDTIME.      Last office visit with prescribing clinician: 1/10/2024   Last telemedicine visit with prescribing clinician: Visit date not found   Next office visit with prescribing clinician: 4/2/2024   {

## 2024-04-01 NOTE — TELEPHONE ENCOUNTER
Caller: Esther Duke    Relationship: Self    Best call back number:[phone]    What medication are you requesting: DIFLUCAN     What are your current symptoms: VAGINAL BURNING AND ITCHING     How long have you been experiencing symptoms: YESTERDAY 8/17/23    Have you had these symptoms before:    [x] Yes  [] No    Have you been treated for these symptoms before:   [x] Yes  [] No    If a prescription is needed, what is your preferred pharmacy and phone number: [facility] [id number] - [place] - [address] [facility] [address] - [phone]  - [phone] FX     Additional notes: PATIENT STATES THAT SHE JUST COMPLETED A ROUND OF ANTIBIOTICS.          [FreeTextEntry2] : Follow-up for left shoulder pain/impingement

## 2024-04-02 ENCOUNTER — OFFICE VISIT (OUTPATIENT)
Dept: FAMILY MEDICINE CLINIC | Facility: CLINIC | Age: 64
End: 2024-04-02
Payer: COMMERCIAL

## 2024-04-02 VITALS
WEIGHT: 155 LBS | DIASTOLIC BLOOD PRESSURE: 72 MMHG | BODY MASS INDEX: 26.46 KG/M2 | HEART RATE: 71 BPM | HEIGHT: 64 IN | OXYGEN SATURATION: 100 % | SYSTOLIC BLOOD PRESSURE: 124 MMHG

## 2024-04-02 DIAGNOSIS — J43.8 OTHER EMPHYSEMA: ICD-10-CM

## 2024-04-02 DIAGNOSIS — F32.9 REACTIVE DEPRESSION: ICD-10-CM

## 2024-04-02 DIAGNOSIS — E78.49 OTHER HYPERLIPIDEMIA: Primary | ICD-10-CM

## 2024-04-02 DIAGNOSIS — K21.00 GASTROESOPHAGEAL REFLUX DISEASE WITH ESOPHAGITIS WITHOUT HEMORRHAGE: ICD-10-CM

## 2024-04-02 NOTE — PROGRESS NOTES
Subjective   Esther Duke is a 63 y.o. female.     Chief Complaint   Patient presents with    Med Refill     Medication check, cramps in both legs at night   Hyperlipidemia, depression, GERD    History of Present Illness   Patient here follow-up for hyperlipidemia, depression, GERD, fibromyalgia, emphysema.  Denies any chest pain shortness of breath.  She is followed by pulmonologist.  Had PFTs done.  Patient taking all current medication.  Legs are cramping at night for last few days.  No numbness or radiation of the pain.  No pain in the back.  Reviewed all current medications.  The following portions of the patient's history were reviewed and updated as appropriate: allergies, current medications, past family history, past medical history, past social history, past surgical history and problem list.    Review of Systems   Constitutional:  Negative for activity change, appetite change, fatigue and fever.   Eyes:  Negative for blurred vision and double vision.   Respiratory: Negative.  Negative for shortness of breath.    Cardiovascular:  Negative for chest pain, palpitations and leg swelling.   Gastrointestinal: Negative.    Genitourinary:  Negative for hematuria.   Neurological:  Negative for dizziness, syncope, light-headedness and headache.   Psychiatric/Behavioral:  Positive for stress. Negative for self-injury and suicidal ideas. The patient is nervous/anxious.        Allergies   Allergen Reactions    Atorvastatin Myalgia    Codeine GI Intolerance    Crestor [Rosuvastatin Calcium] Myalgia    Simvastatin Myalgia    Sulfa Antibiotics Hives       Current Outpatient Medications on File Prior to Visit   Medication Sig Dispense Refill    albuterol sulfate  (90 Base) MCG/ACT inhaler       budesonide (PULMICORT) 1 MG/2ML nebulizer solution       budesonide-formoterol (Symbicort) 80-4.5 MCG/ACT inhaler Inhale 2 puffs 2 (Two) Times a Day. 1 each 0    DULoxetine (CYMBALTA) 30 MG capsule Take 1 capsule by  mouth Daily. 90 capsule 3    DULoxetine (CYMBALTA) 60 MG capsule Take 1 capsule by mouth Daily. 90 capsule 3    famotidine (PEPCID) 40 MG tablet TAKE ONE TABLET BY MOUTH TWICE A  tablet 3    gabapentin (NEURONTIN) 300 MG capsule Take 1 capsule by mouth 2 (Two) Times a Day. 60 capsule 3    linaclotide (LINZESS) 290 MCG capsule capsule Take 1 capsule by mouth Every Morning Before Breakfast. 90 capsule 3    Livalo 4 MG tablet TAKE 1 TABLET BY MOUTH AT BEDTIME. 30 tablet 0    pantoprazole (PROTONIX) 40 MG EC tablet Take 1 tablet by mouth 2 (Two) Times a Day. 180 tablet 3    sucralfate (Carafate) 1 g tablet Take 1 tablet by mouth every night at bedtime. If cant swallow, may cut or Crush tablet, 30 min after other meds, before bedtime. 90 tablet 3    Xiidra 5 % ophthalmic solution       HYDROcodone-acetaminophen (NORCO) 7.5-325 MG per tablet Take 1 tablet by mouth Every 4 (Four) Hours As Needed for Moderate Pain (Pain). 12 tablet 0     No current facility-administered medications on file prior to visit.       Family History   Problem Relation Age of Onset    Hypertension Mother     Hyperlipidemia Mother     Hypertension Father     Hyperlipidemia Father     Stroke Father     Cancer Sister     Aneurysm Maternal Uncle     Heart disease Maternal Grandmother     Colon cancer Neg Hx     Colon polyps Neg Hx        Past Medical History:   Diagnosis Date    Arthritis     COPD (chronic obstructive pulmonary disease)     Fibromyalgia     Gastroesophageal reflux disease without esophagitis     Hernia, hiatal     High cholesterol     IBS (irritable bowel syndrome)     Other hyperlipidemia     Reactive depression        Past Surgical History:   Procedure Laterality Date    ANTROSTOMY Bilateral 11/2/2023    Procedure: Bilateral maxillary antrostomy, Bilateral total ethmoidectomy with frontal sinus exploration;  Surgeon: Stephen Franklin MD;  [place] [facility] [place]  Service: ENT;  Laterality: Bilateral;    COLONOSCOPY       "COLONOSCOPY N/A 9/15/2023    Procedure: COLONOSCOPY to Cecum;  Surgeon: Joe Del Cid MD;  [place]: [facility] [place];  Service: Gastroenterology;  Laterality: N/A;  Melanosis, Diverticulosis    ENDOSCOPIC FUNCTIONAL SINUS SURGERY (FESS) Bilateral 11/2/2023    Procedure: ENDOSCOPIC FUNCTIONAL SINUS SURGERY;  Surgeon: Stephen Franklin MD;  Location: [facility] [place];  Service: ENT;  Laterality: Bilateral;    LAPAROSCOPIC CHOLECYSTECTOMY      TONSILLECTOMY         Social History     Socioeconomic History    Marital status:    Tobacco Use    Smoking status: Never     Passive exposure: Never    Smokeless tobacco: Never    Tobacco comments:     caffeine use   Vaping Use    Vaping status: Never Used   Substance and Sexual Activity    Alcohol use: No    Drug use: No    Sexual activity: Defer       Patient Active Problem List   Diagnosis    Fibromyalgia    Other hyperlipidemia    Reactive depression    Chronic foot pain, right    Irritable bowel syndrome with constipation    Gastroesophageal reflux disease with esophagitis without hemorrhage    Other emphysema       /72 (BP Location: Right arm, Patient Position: Sitting, Cuff Size: Small Adult)   Pulse 71   Ht 162.6 cm (64.02\")   Wt 70.3 kg (155 lb)   SpO2 100%   BMI 26.59 kg/m²   Body mass index is 26.59 kg/m².    Objective   Physical Exam  Vitals and nursing note reviewed.   Constitutional:       Appearance: She is well-developed.   Eyes:      Pupils: Pupils are equal, round, and reactive to light.   Neck:      Thyroid: No thyromegaly.      Vascular: No JVD.      Trachea: No tracheal deviation.   Cardiovascular:      Rate and Rhythm: Normal rate and regular rhythm.      Heart sounds: No murmur heard.  Pulmonary:      Effort: Pulmonary effort is normal. No respiratory distress.      Breath sounds: Normal breath sounds. No wheezing.   Abdominal:      General: Bowel sounds are normal. There is no distension.      Palpations: Abdomen is soft. " There is no mass.      Tenderness: There is no abdominal tenderness. There is no right CVA tenderness, left CVA tenderness, guarding or rebound.      Hernia: No hernia is present.   Musculoskeletal:      Cervical back: Normal range of motion and neck supple.   Lymphadenopathy:      Cervical: No cervical adenopathy.   Neurological:      General: No focal deficit present.      Mental Status: She is alert and oriented to person, place, and time.   Psychiatric:         Mood and Affect: Mood normal.         Behavior: Behavior normal.           Assessment & Plan   Diagnoses and all orders for this visit:    1. Other hyperlipidemia (Primary)  -     CBC & Differential  -     Comprehensive Metabolic Panel  -     CK  -     Lipid Panel With / Chol / HDL Ratio  -     TSH  -     Folate  -     Vitamin B12    2. Reactive depression  -     CBC & Differential  -     Comprehensive Metabolic Panel  -     CK  -     Lipid Panel With / Chol / HDL Ratio  -     TSH  -     Folate  -     Vitamin B12    3. Gastroesophageal reflux disease with esophagitis without hemorrhage  -     CBC & Differential  -     Comprehensive Metabolic Panel  -     CK  -     Lipid Panel With / Chol / HDL Ratio  -     TSH    4. Other emphysema  -     CBC & Differential  -     Comprehensive Metabolic Panel  -     CK  -     Lipid Panel With / Chol / HDL Ratio  -     TSH    Continue diet and exercise.  Continue taking Protonix, Pepcid, Carafate as prescribed by GI.  Helping her GERD symptoms.  Continue Symbicort and albuterol inhaler.  Keep follow-up with pulmonary.  Continue Cymbalta is helping her depression but still has a lot of anxiety.  She is taking Neurontin for fibromyalgia.  Continue with Livalo.  Discussed with patient if leg cramps continue may need to hold Livalo.  Patient is very stressed out, no HI or SI.  Discussed with patient, she is agreeable to referral to psychologist.  I will see her back in 3 months time.  EHR dragon/transcription  disclaimer:  Part of this note are created by electronic transcription/translation of spoken language to printed text and thus may lead to erroneous, or at times, nonsensical words or phrases inadvertently transcribed.  Although I have reviewed for such errors, some may still exist.

## 2024-04-02 NOTE — PROGRESS NOTES
Venipuncture Blood Specimen Collection  Venipuncture performed in left arm by Sharee Ramsay MA with good hemostasis. Patient tolerated the procedure well without complications.   04/02/24   Sharee Ramsay MA

## 2024-04-03 LAB
ALBUMIN SERPL-MCNC: 4.4 G/DL (ref 3.5–5.2)
ALBUMIN/GLOB SERPL: 1.9 G/DL
ALP SERPL-CCNC: 84 U/L (ref 39–117)
ALT SERPL-CCNC: 13 U/L (ref 1–33)
AST SERPL-CCNC: 16 U/L (ref 1–32)
BASOPHILS # BLD AUTO: 0.08 10*3/MM3 (ref 0–0.2)
BASOPHILS NFR BLD AUTO: 1.3 % (ref 0–1.5)
BILIRUB SERPL-MCNC: 0.2 MG/DL (ref 0–1.2)
BUN SERPL-MCNC: 9 MG/DL (ref 8–23)
BUN/CREAT SERPL: 11.8 (ref 7–25)
CALCIUM SERPL-MCNC: 9.6 MG/DL (ref 8.6–10.5)
CHLORIDE SERPL-SCNC: 103 MMOL/L (ref 98–107)
CHOLEST SERPL-MCNC: 237 MG/DL (ref 0–200)
CHOLEST/HDLC SERPL: 2.82 {RATIO}
CK SERPL-CCNC: 60 U/L (ref 20–180)
CO2 SERPL-SCNC: 25.5 MMOL/L (ref 22–29)
CREAT SERPL-MCNC: 0.76 MG/DL (ref 0.57–1)
EGFRCR SERPLBLD CKD-EPI 2021: 88.7 ML/MIN/1.73
EOSINOPHIL # BLD AUTO: 1.12 10*3/MM3 (ref 0–0.4)
EOSINOPHIL NFR BLD AUTO: 17.5 % (ref 0.3–6.2)
ERYTHROCYTE [DISTWIDTH] IN BLOOD BY AUTOMATED COUNT: 14.4 % (ref 12.3–15.4)
FOLATE SERPL-MCNC: 11.7 NG/ML (ref 4.78–24.2)
GLOBULIN SER CALC-MCNC: 2.3 GM/DL
GLUCOSE SERPL-MCNC: 98 MG/DL (ref 65–99)
HCT VFR BLD AUTO: 41.3 % (ref 34–46.6)
HDLC SERPL-MCNC: 84 MG/DL (ref 40–60)
HGB BLD-MCNC: 13 G/DL (ref 12–15.9)
IMM GRANULOCYTES # BLD AUTO: 0.01 10*3/MM3 (ref 0–0.05)
IMM GRANULOCYTES NFR BLD AUTO: 0.2 % (ref 0–0.5)
LDLC SERPL CALC-MCNC: 137 MG/DL (ref 0–100)
LYMPHOCYTES # BLD AUTO: 1.71 10*3/MM3 (ref 0.7–3.1)
LYMPHOCYTES NFR BLD AUTO: 26.8 % (ref 19.6–45.3)
MCH RBC QN AUTO: 26.9 PG (ref 26.6–33)
MCHC RBC AUTO-ENTMCNC: 31.5 G/DL (ref 31.5–35.7)
MCV RBC AUTO: 85.5 FL (ref 79–97)
MONOCYTES # BLD AUTO: 0.55 10*3/MM3 (ref 0.1–0.9)
MONOCYTES NFR BLD AUTO: 8.6 % (ref 5–12)
NEUTROPHILS # BLD AUTO: 2.92 10*3/MM3 (ref 1.7–7)
NEUTROPHILS NFR BLD AUTO: 45.6 % (ref 42.7–76)
NRBC BLD AUTO-RTO: 0 /100 WBC (ref 0–0.2)
PLATELET # BLD AUTO: 370 10*3/MM3 (ref 140–450)
POTASSIUM SERPL-SCNC: 4.4 MMOL/L (ref 3.5–5.2)
PROT SERPL-MCNC: 6.7 G/DL (ref 6–8.5)
RBC # BLD AUTO: 4.83 10*6/MM3 (ref 3.77–5.28)
SODIUM SERPL-SCNC: 141 MMOL/L (ref 136–145)
TRIGL SERPL-MCNC: 93 MG/DL (ref 0–150)
TSH SERPL DL<=0.005 MIU/L-ACNC: 3.17 UIU/ML (ref 0.27–4.2)
VIT B12 SERPL-MCNC: 689 PG/ML (ref 211–946)
VLDLC SERPL CALC-MCNC: 16 MG/DL (ref 5–40)
WBC # BLD AUTO: 6.39 10*3/MM3 (ref 3.4–10.8)

## 2024-04-04 ENCOUNTER — PATIENT ROUNDING (BHMG ONLY) (OUTPATIENT)
Dept: FAMILY MEDICINE CLINIC | Facility: CLINIC | Age: 64
End: 2024-04-04
Payer: COMMERCIAL

## 2024-04-04 NOTE — PROGRESS NOTES
A [employer] message has been sent to the patient for PATIENT ROUNDING with [facility]

## 2024-04-09 DIAGNOSIS — K21.00 GASTROESOPHAGEAL REFLUX DISEASE WITH ESOPHAGITIS WITHOUT HEMORRHAGE: ICD-10-CM

## 2024-04-09 DIAGNOSIS — R10.13 DYSPEPSIA: ICD-10-CM

## 2024-04-09 DIAGNOSIS — K58.1 IRRITABLE BOWEL SYNDROME WITH CONSTIPATION: ICD-10-CM

## 2024-04-09 DIAGNOSIS — R14.0 BLOATING: ICD-10-CM

## 2024-04-09 RX ORDER — PANTOPRAZOLE SODIUM 40 MG/1
40 TABLET, DELAYED RELEASE ORAL 2 TIMES DAILY
Qty: 180 TABLET | Refills: 3 | Status: SHIPPED | OUTPATIENT
Start: 2024-04-09

## 2024-04-09 RX ORDER — FAMOTIDINE 40 MG/1
40 TABLET, FILM COATED ORAL 2 TIMES DAILY
Qty: 180 TABLET | Refills: 0 | Status: SHIPPED | OUTPATIENT
Start: 2024-04-09

## 2024-04-09 NOTE — TELEPHONE ENCOUNTER
Caller: [facility] - [address] - [phone] [facility] [phone] FX    Relationship: Pharmacy    Best call back number: [phone]     Requested Prescriptions:   Requested Prescriptions     Pending Prescriptions Disp Refills    pantoprazole (PROTONIX) 40 MG EC tablet 180 tablet 3     Sig: Take 1 tablet by mouth 2 (Two) Times a Day.        Pharmacy where request should be sent: [facility]"" - [address] - [phone] [facility] [phone] FX     Last office visit with prescribing clinician: 4/2/2024   Last telemedicine visit with prescribing clinician: Visit date not found   Next office visit with prescribing clinician: 7/9/2024     Additional details provided by patient: PHARMACY IS REQUESTING A NEW WRITTEN PRESCRIPTION BE SENT IN WITH THIS REFILL REQUEST AS THIS MEDICATION HAS NOT BEEN FILLED WITH THEM BEFORE.     PATIENT IS DOWN TO HER LAST 2 TABLETS.     Does the patient have less than a 3 day supply:  [x] Yes  [] No    Would you like a call back once the refill request has been completed: [] Yes [x] No    If the office needs to give you a call back, can they leave a voicemail: [x] Yes [] No    Geneva Fong Rep   04/09/24 09:28 EDT

## 2024-04-15 RX ORDER — PITAVASTATIN CALCIUM 4.18 MG/1
1 TABLET, FILM COATED ORAL
Qty: 30 TABLET | Refills: 3 | Status: SHIPPED | OUTPATIENT
Start: 2024-04-15

## 2024-04-17 ENCOUNTER — OFFICE VISIT (OUTPATIENT)
Age: 64
End: 2024-04-17
Payer: COMMERCIAL

## 2024-04-17 DIAGNOSIS — F33.1 MODERATE EPISODE OF RECURRENT MAJOR DEPRESSIVE DISORDER: Primary | ICD-10-CM

## 2024-04-17 DIAGNOSIS — F41.1 GENERALIZED ANXIETY DISORDER: ICD-10-CM

## 2024-04-17 NOTE — PATIENT INSTRUCTIONS
Please contact the Suicide & Crisis Hotline at 728 or 101 if you experience suicidal or homicidal thoughts.
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
## 2024-04-19 ENCOUNTER — PATIENT ROUNDING (BHMG ONLY) (OUTPATIENT)
Age: 64
End: 2024-04-19
Payer: COMMERCIAL

## 2024-04-25 ENCOUNTER — TRANSCRIBE ORDERS (OUTPATIENT)
Dept: ADMINISTRATIVE | Facility: HOSPITAL | Age: 64
End: 2024-04-25
Payer: COMMERCIAL

## 2024-04-25 DIAGNOSIS — H90.A22 SENSORINEURAL HEARING LOSS (SNHL) OF LEFT EAR WITH RESTRICTED HEARING OF RIGHT EAR: Primary | ICD-10-CM

## 2024-05-01 ENCOUNTER — OFFICE VISIT (OUTPATIENT)
Age: 64
End: 2024-05-01
Payer: COMMERCIAL

## 2024-05-01 DIAGNOSIS — F33.1 MODERATE EPISODE OF RECURRENT MAJOR DEPRESSIVE DISORDER: ICD-10-CM

## 2024-05-01 DIAGNOSIS — F41.1 GENERALIZED ANXIETY DISORDER: Primary | ICD-10-CM

## 2024-05-01 NOTE — PROGRESS NOTES
[facility]     Follow Up Adult Note     Date:2024   Client Name: Esther Duke  : 1960   MRN: [id number]   Time IN: 10:00 AM    Time OUT: 11:00 AM     Referring Provider: Avery Armstrong MD    Chief Complaint:      ICD-10-CM ICD-9-CM   1. Generalized anxiety disorder  F41.1 300.02   2. Moderate episode of recurrent major depressive disorder  F33.1 296.32        History of Present Illness:   Esther Duke is a 63 y.o. female who is being seen today for follow up individual psychotherapy counseling for depression and anxiety.        Objective     PHQ-9 Depression Screening  Little interest or pleasure in doing things? 2-->more than half the days   Feeling down, depressed, or hopeless? 1-->several days   Trouble falling or staying asleep, or sleeping too much? 2-->more than half the days   Feeling tired or having little energy? 2-->more than half the days   Poor appetite or overeating? 0-->not at all   Feeling bad about yourself - or that you are a failure or have let yourself or your family down? 0-->not at all   Trouble concentrating on things, such as reading the newspaper or watching television? 0-->not at all   Moving or speaking so slowly that other people could have noticed? Or the opposite - being so fidgety or restless that you have been moving around a lot more than usual? 0-->not at all   Thoughts that you would be better off dead, or of hurting yourself in some way?     PHQ-9 Total Score 7   If you checked off any problems, how difficult have these problems made it for you to do your work, take care of things at home, or get along with other people?        MARK-7  Feeling nervous, anxious or on edge: More than half the days  Not being able to stop or control worrying: Not at all  Worrying too much about different things: Several days  Trouble Relaxing: Several days  Being so restless that it is hard to sit still: More than half the days  Feeling afraid as if  something awful might happen: Not at all  Becoming easily annoyed or irritable: Several days  MARK 7 Total Score: 7  If you checked any problems, how difficult have these problems made it for you to do your work, take care of things at home, or get along with other people: Not difficult at all      Medications:     Current Outpatient Medications:     albuterol sulfate  (90 Base) MCG/ACT inhaler, , Disp: , Rfl:     budesonide (PULMICORT) 1 MG/2ML nebulizer solution, , Disp: , Rfl:     budesonide-formoterol (Symbicort) 80-4.5 MCG/ACT inhaler, Inhale 2 puffs 2 (Two) Times a Day., Disp: 1 each, Rfl: 0    DULoxetine (CYMBALTA) 30 MG capsule, Take 1 capsule by mouth Daily., Disp: 90 capsule, Rfl: 3    DULoxetine (CYMBALTA) 60 MG capsule, Take 1 capsule by mouth Daily., Disp: 90 capsule, Rfl: 3    famotidine (PEPCID) 40 MG tablet, TAKE 1 TABLET BY MOUTH 2 TIMES A DAY., Disp: 180 tablet, Rfl: 0    gabapentin (NEURONTIN) 300 MG capsule, Take 1 capsule by mouth 2 (Two) Times a Day., Disp: 60 capsule, Rfl: 3    HYDROcodone-acetaminophen (NORCO) 7.5-325 MG per tablet, Take 1 tablet by mouth Every 4 (Four) Hours As Needed for Moderate Pain (Pain)., Disp: 12 tablet, Rfl: 0    linaclotide (LINZESS) 290 MCG capsule capsule, Take 1 capsule by mouth Every Morning Before Breakfast., Disp: 90 capsule, Rfl: 3    pantoprazole (PROTONIX) 40 MG EC tablet, Take 1 tablet by mouth 2 (Two) Times a Day., Disp: 180 tablet, Rfl: 3    Pitavastatin Calcium (Livalo) 4 MG tablet, TAKE 1 TABLET BY MOUTH AT BEDTIME., Disp: 30 tablet, Rfl: 3    sucralfate (Carafate) 1 g tablet, Take 1 tablet by mouth every night at bedtime. If cant swallow, may cut or Crush tablet, 30 min after other meds, before bedtime., Disp: 90 tablet, Rfl: 3    Xiidra 5 % ophthalmic solution, , Disp: , Rfl:     Allergies:   Allergies   Allergen Reactions    Atorvastatin Myalgia    Codeine GI Intolerance    Crestor [Rosuvastatin Calcium] Myalgia    Simvastatin Myalgia    Sulfa  Antibiotics Hives         Subjective     Mental Status Exam:   MENTAL STATUS EXAM   General Appearance:  Cleanly groomed and dressed  Eye Contact:  Good eye contact  Attitude:  Cooperative  Motor Activity:  Normal gait, posture  Muscle Strength:  Normal  Speech:  Normal rate, tone, volume  Language:  Unspontaneous and stereotypical  Mood and affect:  Normal, pleasant  Thought Process:  Logical and goal-directed  Associations/ Thought Content:  No delusions  Hallucinations:  None  Suicidal Ideations:  Not present  Homicidal Ideation:  Not present  Sensorium:  Alert and clear  Orientation:  Person, place, time and situation  Immediate Recall, Recent, and Remote Memory:  Intact  Attention Span/ Concentration:  Good  Fund of Knowledge:  Appropriate for age and educational level  Insight:  Good  Judgement:  Good  Reliability:  Good  Impulse Control:  Good       Client's Support Network Includes:  , daughter, son, extended family, and friends    Functional Status: No impairment    Progress toward goal: improvement in symptoms, not at goal     Prognosis: Good with Ongoing Treatment     Assessment / Plan / Progress     Visit Diagnosis/Orders Placed This Visit:    ICD-10-CM ICD-9-CM   1. Generalized anxiety disorder  F41.1 300.02   2. Moderate episode of recurrent major depressive disorder  F33.1 296.32        SUICIDE RISK ASSESSMENT/CSSRS:  1. Does client have thoughts of suicide? Patient denies  2. Does client have intent for suicide? Patient denies  3. Does client have a current plan for suicide? Patient denies   4. History of suicide attempts: Patient denies   5. Family history of suicide or attempts: Patient denies   6. History of violent behaviors towards others or property or thoughts of committing suicide: Patient denies   7. History of sexual aggression toward others: Patient denies   8. Access to firearms or weapons: Yes, patient reports access to firearms in the home. Clinician discussed with client about  removing access to firearms in the event of suicidal ideation.     PLAN:  Safety: No acute safety concerns  Risk Assessment: Risk of self-harm acutely is low. Risk of self-harm chronically is also low, but could be further elevated in the event of treatment noncompliance and/or AODA.    Treatment Plan/Goals & Progress: Client reports an improvement in stress, depression, and anxiety levels. Client reports that she stepped down to a different position at work which has helped improve her symptoms and stress levels. Clients reports that she has been able to practice self care by walking and participating in other outdoor activities. Continue supportive psychotherapy efforts and medications as indicated. Client ackowledged and verbally consented to continue with current treatment plan and was educated on the importance of compliance with treatment and follow-up appointments. Client seems reasonably able to adhere to treatment plan.      Assisted client in processing above session content; acknowledged and normalized client’s thoughts, feelings, and concerns.  Rationalized client's thought process regarding depression and anxiety.      Allowed client to freely discuss issues without interruption or judgement with unconditional positive regard, active listening skills, and empathy. Clinician provided a safe, confidential environment to facilitate the development of a positive therapeutic relationship and encouraged open, honest communication. Assisted client in processing session content; acknowledged and normalized client’s thoughts, feelings, and concerns by utilizing a person-centered approach in efforts to build appropriate rapport and a positive therapeutic relationship with open and honest communication.       Follow Up:   Return in about 2 weeks (around 5/15/2024) for Next scheduled follow up.    Geovanna Bird LCSW  [facility] [place]

## 2024-05-13 ENCOUNTER — HOSPITAL ENCOUNTER (OUTPATIENT)
Dept: MRI IMAGING | Facility: HOSPITAL | Age: 64
Discharge: HOME | End: 2024-05-13
Admitting: INTERNAL MEDICINE
Payer: COMMERCIAL

## 2024-05-13 DIAGNOSIS — H90.A22 SENSORINEURAL HEARING LOSS (SNHL) OF LEFT EAR WITH RESTRICTED HEARING OF RIGHT EAR: ICD-10-CM

## 2024-05-13 LAB — CREAT BLDA-MCNC: 0.8 MG/DL (ref 0.6–1.3)

## 2024-05-13 PROCEDURE — 0 GADOBENATE DIMEGLUMINE 529 MG/ML SOLUTION: Performed by: INTERNAL MEDICINE

## 2024-05-13 PROCEDURE — 70553 MRI BRAIN STEM W/O & W/DYE: CPT

## 2024-05-13 PROCEDURE — 82565 ASSAY OF CREATININE: CPT

## 2024-05-13 PROCEDURE — A9577 INJ MULTIHANCE: HCPCS | Performed by: INTERNAL MEDICINE

## 2024-05-13 RX ADMIN — GADOBENATE DIMEGLUMINE 14 ML: 529 INJECTION, SOLUTION INTRAVENOUS at 09:26

## 2024-06-27 RX ORDER — DULOXETIN HYDROCHLORIDE 60 MG/1
60 CAPSULE, DELAYED RELEASE ORAL DAILY
Qty: 90 CAPSULE | Refills: 0 | Status: SHIPPED | OUTPATIENT
Start: 2024-06-27

## 2024-07-05 ENCOUNTER — TELEPHONE (OUTPATIENT)
Dept: GASTROENTEROLOGY | Facility: CLINIC | Age: 64
End: 2024-07-05
Payer: COMMERCIAL

## 2024-07-05 DIAGNOSIS — K21.9 GASTROESOPHAGEAL REFLUX DISEASE WITHOUT ESOPHAGITIS: ICD-10-CM

## 2024-07-05 RX ORDER — SUCRALFATE 1 G/1
1 TABLET ORAL
Qty: 90 TABLET | Refills: 3 | Status: SHIPPED | OUTPATIENT
Start: 2024-07-05

## 2024-07-05 NOTE — TELEPHONE ENCOUNTER
Caller: SHELTON     Relationship: [facility]     Best call back number: [phone]     Requested Prescriptions:     sucralfate (Carafate) 1 g tablet     Requested Prescriptions      No prescriptions requested or ordered in this encounter        Pharmacy where request should be sent:  [facility]     Last office visit with prescribing clinician: 12/22/2022   Last telemedicine visit with prescribing clinician: Visit date not found   Next office visit with prescribing clinician: Visit date not found     Additional details provided by patient:     Does the patient have less than a 3 day supply:  [x] Yes  [] No    Would you like a call back once the refill request has been completed: [x] Yes [] No    If the office needs to give you a call back, can they leave a voicemail: [x] Yes [] No    Geneva Guillen Rep   07/05/24 10:11 EDT

## 2024-07-25 ENCOUNTER — OFFICE VISIT (OUTPATIENT)
Dept: FAMILY MEDICINE CLINIC | Facility: CLINIC | Age: 64
End: 2024-07-25
Payer: COMMERCIAL

## 2024-07-25 VITALS
SYSTOLIC BLOOD PRESSURE: 122 MMHG | OXYGEN SATURATION: 94 % | DIASTOLIC BLOOD PRESSURE: 80 MMHG | HEIGHT: 64 IN | HEART RATE: 73 BPM | TEMPERATURE: 97.9 F | WEIGHT: 159 LBS | BODY MASS INDEX: 27.14 KG/M2 | RESPIRATION RATE: 18 BRPM

## 2024-07-25 DIAGNOSIS — M79.7 FIBROMYALGIA: Primary | ICD-10-CM

## 2024-07-25 DIAGNOSIS — K21.00 GASTROESOPHAGEAL REFLUX DISEASE WITH ESOPHAGITIS WITHOUT HEMORRHAGE: ICD-10-CM

## 2024-07-25 DIAGNOSIS — F32.9 REACTIVE DEPRESSION: ICD-10-CM

## 2024-07-25 DIAGNOSIS — R20.2 PARESTHESIA: ICD-10-CM

## 2024-07-25 DIAGNOSIS — E78.49 OTHER HYPERLIPIDEMIA: ICD-10-CM

## 2024-07-25 DIAGNOSIS — R07.9 CHEST PAIN, UNSPECIFIED TYPE: ICD-10-CM

## 2024-07-25 PROCEDURE — 93000 ELECTROCARDIOGRAM COMPLETE: CPT | Performed by: INTERNAL MEDICINE

## 2024-07-25 PROCEDURE — 99214 OFFICE O/P EST MOD 30 MIN: CPT | Performed by: INTERNAL MEDICINE

## 2024-07-25 RX ORDER — MONTELUKAST SODIUM 10 MG/1
TABLET ORAL
COMMUNITY
Start: 2024-06-27

## 2024-07-25 NOTE — PROGRESS NOTES
Subjective   Esther Duke is a 64 y.o. female.     Chief Complaint   Patient presents with    Hyperlipidemia    Depression    Anxiety       Hyperlipidemia  Associated symptoms include chest pain. Pertinent negatives include no shortness of breath.   DepressionSymptoms include chest pain. Patient is not experiencing: choking sensation, decreased concentration, depressed mood, palpitations, shortness of breath and dizziness.  Her past medical history is significant for depression.   Anxiety   Symptoms include chest pain.  Patient reports no decreased concentration, depressed mood, dizziness, palpitations or shortness of breath. Her past medical history is significant for depression.      Patient seen follow-up for hyperlipidemia, depression, fibromyalgia, GERD, anxiety,.  Patient reports she is having tingling in her hands and feet for long time.  No weakness.  Patient also complains of 2 nights ago burning chest pain.  She took Carafate to help.  Patient is followed by Dr. Del Cid and also by Dr. Chavez.  The following portions of the patient's history were reviewed and updated as appropriate: allergies, current medications, past family history, past medical history, past social history, past surgical history and problem list.    Review of Systems   Constitutional:  Negative for activity change, appetite change, fatigue and fever.   Eyes:  Negative for blurred vision and double vision.   Respiratory:  Negative for apnea, cough, choking, chest tightness and shortness of breath.    Cardiovascular:  Positive for chest pain. Negative for palpitations and leg swelling.   Gastrointestinal: Negative.    Genitourinary:  Negative for hematuria.   Neurological:  Negative for dizziness, syncope, light-headedness and headache.   Psychiatric/Behavioral:  Negative for agitation, behavioral problems, decreased concentration and depressed mood.        Allergies   Allergen Reactions    Atorvastatin Myalgia    Codeine GI  Intolerance    Crestor [Rosuvastatin Calcium] Myalgia    Simvastatin Myalgia    Sulfa Antibiotics Hives    Gadolinium Derivatives (Mr Contrast) Cough     Patient had a coughing spell after contrast was given. Patient was fine to complete test and was reviewed by radiologist after exam. No treatment was needed.  She is OK to get MRI contrast in the future but may need premedicated.        Current Outpatient Medications on File Prior to Visit   Medication Sig Dispense Refill    albuterol sulfate  (90 Base) MCG/ACT inhaler       budesonide (PULMICORT) 1 MG/2ML nebulizer solution       budesonide-formoterol (Symbicort) 80-4.5 MCG/ACT inhaler Inhale 2 puffs 2 (Two) Times a Day. 1 each 0    DULoxetine (CYMBALTA) 30 MG capsule Take 1 capsule by mouth Daily. 90 capsule 3    DULoxetine (CYMBALTA) 60 MG capsule TAKE 1 CAPSULE BY MOUTH DAILY. 90 capsule 0    gabapentin (NEURONTIN) 300 MG capsule Take 1 capsule by mouth 2 (Two) Times a Day. 60 capsule 3    linaclotide (LINZESS) 290 MCG capsule capsule Take 1 capsule by mouth Every Morning Before Breakfast. 90 capsule 3    montelukast (SINGULAIR) 10 MG tablet       pantoprazole (PROTONIX) 40 MG EC tablet Take 1 tablet by mouth 2 (Two) Times a Day. 180 tablet 3    Pitavastatin Calcium (Livalo) 4 MG tablet TAKE 1 TABLET BY MOUTH AT BEDTIME. 30 tablet 3    sucralfate (Carafate) 1 g tablet Take 1 tablet by mouth every night at bedtime. If cant swallow, may cut or Crush tablet, 30 min after other meds, before bedtime. 90 tablet 3    Xiidra 5 % ophthalmic solution       famotidine (PEPCID) 40 MG tablet TAKE 1 TABLET BY MOUTH 2 TIMES A DAY. 180 tablet 0    HYDROcodone-acetaminophen (NORCO) 7.5-325 MG per tablet Take 1 tablet by mouth Every 4 (Four) Hours As Needed for Moderate Pain (Pain). 12 tablet 0     No current facility-administered medications on file prior to visit.       Family History   Problem Relation Age of Onset    Hypertension Mother     Hyperlipidemia Mother      Hypertension Father     Hyperlipidemia Father     Stroke Father     Cancer Sister     Aneurysm Maternal Uncle     Heart disease Maternal Grandmother     Colon cancer Neg Hx     Colon polyps Neg Hx        Past Medical History:   Diagnosis Date    Arthritis     COPD (chronic obstructive pulmonary disease)     Fibromyalgia     Gastroesophageal reflux disease without esophagitis     Hernia, hiatal     High cholesterol     IBS (irritable bowel syndrome)     Other hyperlipidemia     Reactive depression        Past Surgical History:   Procedure Laterality Date    ANTROSTOMY Bilateral 11/2/2023    Procedure: Bilateral maxillary antrostomy, Bilateral total ethmoidectomy with frontal sinus exploration;  Surgeon: Stephen Franklin MD;  Location: [facility];  Service: ENT;  Laterality: Bilateral;    COLONOSCOPY      COLONOSCOPY N/A 9/15/2023    Procedure: COLONOSCOPY to Cecum;  Surgeon: Joe Del Cid MD;  Location: [facility];  Service: Gastroenterology;  Laterality: N/A;  Melanosis, Diverticulosis    ENDOSCOPIC FUNCTIONAL SINUS SURGERY (FESS) Bilateral 11/2/2023    Procedure: ENDOSCOPIC FUNCTIONAL SINUS SURGERY;  Surgeon: Stephen Franklin MD;  Location: [facility];  Service: ENT;  Laterality: Bilateral;    LAPAROSCOPIC CHOLECYSTECTOMY      TONSILLECTOMY         Social History     Socioeconomic History    Marital status:    Tobacco Use    Smoking status: Never     Passive exposure: Never    Smokeless tobacco: Never    Tobacco comments:     caffeine use   Vaping Use    Vaping status: Never Used   Substance and Sexual Activity    Alcohol use: No    Drug use: No    Sexual activity: Defer       Patient Active Problem List   Diagnosis    Fibromyalgia    Other hyperlipidemia    Reactive depression    Chronic foot pain, right    Irritable bowel syndrome with constipation    Gastroesophageal reflux disease with esophagitis without hemorrhage    Other emphysema    Moderate episode of recurrent  "major depressive disorder    Generalized anxiety disorder       /80   Pulse 73   Temp 97.9 °F (36.6 °C)   Resp 18   Ht 162.6 cm (64.02\")   Wt 72.1 kg (159 lb)   SpO2 94%   BMI 27.28 kg/m²   Body mass index is 27.28 kg/m².    Objective   Physical Exam  Constitutional:       Appearance: She is well-developed.   Eyes:      Extraocular Movements: Extraocular movements intact.      Pupils: Pupils are equal, round, and reactive to light.   Neck:      Thyroid: No thyromegaly.      Vascular: No JVD.      Trachea: No tracheal deviation.   Cardiovascular:      Rate and Rhythm: Normal rate and regular rhythm.      Heart sounds: No murmur heard.  Pulmonary:      Effort: Pulmonary effort is normal. No respiratory distress.      Breath sounds: Normal breath sounds. No wheezing.   Abdominal:      General: Bowel sounds are normal. There is no distension.      Palpations: Abdomen is soft. There is no mass.      Tenderness: There is no abdominal tenderness. There is no right CVA tenderness, left CVA tenderness, guarding or rebound.      Hernia: No hernia is present.   Musculoskeletal:      Cervical back: Normal range of motion and neck supple.   Lymphadenopathy:      Cervical: No cervical adenopathy.   Neurological:      General: No focal deficit present.      Mental Status: She is alert and oriented to person, place, and time.   Psychiatric:         Mood and Affect: Mood normal.         Behavior: Behavior normal.           Assessment & Plan   Diagnoses and all orders for this visit:    1. Fibromyalgia (Primary)  -     CBC & Differential  -     CK  -     Lipid Panel With / Chol / HDL Ratio  -     TSH  -     T4, Free  -     Folate  -     Vitamin B12    2. Gastroesophageal reflux disease with esophagitis without hemorrhage  -     CBC & Differential  -     CK  -     Lipid Panel With / Chol / HDL Ratio  -     TSH  -     T4, Free  -     Folate  -     Vitamin B12    3. Reactive depression  -     CBC & Differential  -     CK  - "     Lipid Panel With / Chol / HDL Ratio  -     TSH  -     T4, Free  -     Folate  -     Vitamin B12    4. Other hyperlipidemia  -     CBC & Differential  -     CK  -     Lipid Panel With / Chol / HDL Ratio  -     TSH  -     T4, Free  -     Folate  -     Vitamin B12    5. Paresthesia  -     TSH  -     T4, Free  -     Folate  -     Vitamin B12  -     Ambulatory Referral to Neurology    6. Chest pain, unspecified type  -     ECG 12 Lead    Continue diet and exercise.  Continue checking blood pressure at home.  Lose weight.  Continue Protonix, Pepcid, Carafate.  Helping her symptoms.  Continue Pulmicort albuterol, Neurontin, Cymbalta, Linzess, singular, Livalo  EKG showed normal sinus rhythm.  No ST elevation depression.  No changes from compared to EKG of 2022.  Discussed with patient if chest pain worsens or recurrent go to ER.  Patient to call Dr. Chavez for follow-up.  Otherwise I will see her back in 3 months time.  Rest of problems are chronic and stable.  EHR dragon/transcription disclaimer:  Part of this note are created by electronic transcription/translation of spoken language to printed text and thus may lead to erroneous, or at times, nonsensical words or phrases inadvertently transcribed.  Although I have reviewed for such errors, some may still exist.

## 2024-08-20 RX ORDER — PITAVASTATIN CALCIUM 4.18 MG/1
1 TABLET, FILM COATED ORAL
Qty: 30 TABLET | Refills: 0 | Status: SHIPPED | OUTPATIENT
Start: 2024-08-20

## 2024-09-03 DIAGNOSIS — K58.1 IRRITABLE BOWEL SYNDROME WITH CONSTIPATION: ICD-10-CM

## 2024-09-03 DIAGNOSIS — K21.00 GASTROESOPHAGEAL REFLUX DISEASE WITH ESOPHAGITIS WITHOUT HEMORRHAGE: ICD-10-CM

## 2024-09-03 DIAGNOSIS — M79.7 FIBROMYALGIA: ICD-10-CM

## 2024-09-03 DIAGNOSIS — R10.13 DYSPEPSIA: ICD-10-CM

## 2024-09-03 RX ORDER — GABAPENTIN 300 MG/1
300 CAPSULE ORAL 2 TIMES DAILY
Qty: 60 CAPSULE | Refills: 3 | Status: SHIPPED | OUTPATIENT
Start: 2024-09-03

## 2024-09-03 RX ORDER — DULOXETIN HYDROCHLORIDE 30 MG/1
30 CAPSULE, DELAYED RELEASE ORAL DAILY
Qty: 90 CAPSULE | Refills: 3 | Status: SHIPPED | OUTPATIENT
Start: 2024-09-03

## 2024-09-03 RX ORDER — FAMOTIDINE 40 MG/1
40 TABLET, FILM COATED ORAL 2 TIMES DAILY
Qty: 180 TABLET | Refills: 0 | Status: SHIPPED | OUTPATIENT
Start: 2024-09-03

## 2024-09-03 NOTE — TELEPHONE ENCOUNTER
Rx Refill Note  Requested Prescriptions     Pending Prescriptions Disp Refills    gabapentin (NEURONTIN) 300 MG capsule [Pharmacy Med Name: GABAPENTIN 300 MG CAPSULE] 60 capsule 0     Sig: TAKE 1 CAPSULE BY MOUTH 2 TIMES A DAY.    DULoxetine (CYMBALTA) 30 MG capsule [Pharmacy Med Name: DULOXETINE HCL DR 30 MG CAP] 90 capsule 0     Sig: TAKE 1 CAPSULE BY MOUTH DAILY.      Last office visit with prescribing clinician: 7/25/2024   Last telemedicine visit with prescribing clinician: Visit date not found   Next office visit with prescribing clinician: Visit date not found

## 2024-09-05 ENCOUNTER — TELEPHONE (OUTPATIENT)
Dept: NEUROLOGY | Facility: CLINIC | Age: 64
End: 2024-09-05
Payer: COMMERCIAL

## 2024-09-05 NOTE — TELEPHONE ENCOUNTER
09/05/2024 Called Mrs Duke and offered her a sooner appointment, Today at 3:00 pm ,she is not able to come in today

## 2024-09-25 RX ORDER — PITAVASTATIN CALCIUM 4.18 MG/1
1 TABLET, FILM COATED ORAL
Qty: 30 TABLET | Refills: 2 | Status: SHIPPED | OUTPATIENT
Start: 2024-09-25

## 2024-09-26 RX ORDER — DULOXETIN HYDROCHLORIDE 60 MG/1
60 CAPSULE, DELAYED RELEASE ORAL DAILY
Qty: 90 CAPSULE | Refills: 0 | Status: SHIPPED | OUTPATIENT
Start: 2024-09-26

## 2025-01-03 DIAGNOSIS — M79.7 FIBROMYALGIA: ICD-10-CM

## 2025-01-05 RX ORDER — GABAPENTIN 300 MG/1
300 CAPSULE ORAL 2 TIMES DAILY
Qty: 60 CAPSULE | Refills: 2 | Status: SHIPPED | OUTPATIENT
Start: 2025-01-05

## 2025-01-05 RX ORDER — PITAVASTATIN CALCIUM 4.18 MG/1
1 TABLET, FILM COATED ORAL
Qty: 30 TABLET | Refills: 6 | Status: SHIPPED | OUTPATIENT
Start: 2025-01-05

## 2025-03-25 RX ORDER — DULOXETIN HYDROCHLORIDE 60 MG/1
60 CAPSULE, DELAYED RELEASE ORAL DAILY
Qty: 90 CAPSULE | Refills: 3 | Status: SHIPPED | OUTPATIENT
Start: 2025-03-25

## 2025-04-08 DIAGNOSIS — M79.7 FIBROMYALGIA: ICD-10-CM

## 2025-04-08 NOTE — TELEPHONE ENCOUNTER
Caller: DukeEsther    Relationship: Self    Best call back number: [phone]     Requested Prescriptions:   Requested Prescriptions     Pending Prescriptions Disp Refills    gabapentin (NEURONTIN) 300 MG capsule 60 capsule 2     Sig: Take 1 capsule by mouth 2 (Two) Times a Day.        Pharmacy where request should be sent: [address] [phone] [facility] [phone]      Last office visit with prescribing clinician: 7/25/2024   Last telemedicine visit with prescribing clinician: Visit date not found   Next office visit with prescribing clinician: Visit date not found     Does the patient have less than a 3 day supply:  [x] Yes  [] No    Would you like a call back once the refill request has been completed: [] Yes [x] No    If the office needs to give you a call back, can they leave a voicemail: [] Yes [x] No    Geneva Paul   04/08/25 11:52 EDT

## 2025-04-09 RX ORDER — GABAPENTIN 300 MG/1
300 CAPSULE ORAL 2 TIMES DAILY
Qty: 60 CAPSULE | Refills: 2 | Status: SHIPPED | OUTPATIENT
Start: 2025-04-09

## 2025-07-10 DIAGNOSIS — M79.7 FIBROMYALGIA: ICD-10-CM

## 2025-07-10 RX ORDER — GABAPENTIN 300 MG/1
300 CAPSULE ORAL 2 TIMES DAILY
Qty: 60 CAPSULE | Refills: 0 | OUTPATIENT
Start: 2025-07-10

## 2025-07-10 RX ORDER — GABAPENTIN 300 MG/1
300 CAPSULE ORAL 2 TIMES DAILY
Qty: 60 CAPSULE | Refills: 2 | OUTPATIENT
Start: 2025-07-10

## 2025-07-10 NOTE — TELEPHONE ENCOUNTER
Caller: Esther Duke    Relationship: Self    Best call back number:     [phone]        Requested Prescriptions:   Requested Prescriptions     Pending Prescriptions Disp Refills    gabapentin (NEURONTIN) 300 MG capsule 60 capsule 2     Sig: Take 1 capsule by mouth 2 (Two) Times a Day.        Pharmacy where request should be sent: [address] [phone] [facility] [phone]      Last office visit with prescribing clinician: 7/25/2024   Last telemedicine visit with prescribing clinician: Visit date not found   Next office visit with prescribing clinician: Visit date not found     Additional details provided by patient: OUT    Does the patient have less than a 3 day supply:  [x] Yes  [] No    Would you like a call back once the refill request has been completed: [] Yes [] No    If the office needs to give you a call back, can they leave a voicemail: [] Yes [] No    Kamari Spears   07/10/25 13:12 EDT

## 2025-07-10 NOTE — TELEPHONE ENCOUNTER
Caller: Esther Duke    Relationship: Self    Best call back number: [phone]      What was the call regarding: PATIENT MADE APPOINTMENT TO SEE DR MARTE FOR 9/18/25 FIRST AVAVIBLE     PATIENT WANTED TO SEE IF DR MARTE CAN REFILL HER ENOUGH MEDICATION TILL THIS APPOINTMENT FOR THE     gabapentin (NEURONTIN) 300 MG capsule     DUE TO PATIENT HAS FIBROMYALGIA     [facility] - [address] [phone] [facility] [phone] FX

## (undated) DEVICE — WIPE INST MEROCEL

## (undated) DEVICE — GOWN ISOL W/THUMB UNIV BLU BX/15

## (undated) DEVICE — KT ANTI FOG W/FLD AND SPNG

## (undated) DEVICE — DRSNG TELFA PAD NONADH STR 1S 3X4IN

## (undated) DEVICE — STERILE POLYISOPRENE POWDER-FREE SURGICAL GLOVES: Brand: PROTEXIS

## (undated) DEVICE — ADHS LIQ MASTISOL 2/3ML

## (undated) DEVICE — SUT SILK 2/0 FS BLK 18IN 685G

## (undated) DEVICE — Device

## (undated) DEVICE — PK ENT 46

## (undated) DEVICE — CANN O2 ETCO2 FITS ALL CONN CO2 SMPL A/ 7IN DISP LF

## (undated) DEVICE — SYRINGE, LUER LOCK, 60ML: Brand: MEDLINE

## (undated) DEVICE — GOWN,AURORA,NONREINFORCED,XLARGE: Brand: MEDLINE

## (undated) DEVICE — CONTAINER,SPECIMEN,OR STERILE,4OZ: Brand: MEDLINE

## (undated) DEVICE — TBG SXN UNIV CONN/SCALLOP/FEM 1/4IN 20FT STRL

## (undated) DEVICE — FLEX ADVANTAGE 1500CC: Brand: FLEX ADVANTAGE

## (undated) DEVICE — Device: Brand: FABCO

## (undated) DEVICE — SYRINGE, LUER SLIP, STERILE, 60ML: Brand: MEDLINE

## (undated) DEVICE — STANDARD 8CM: Brand: NASOPORE

## (undated) DEVICE — JACKT LAB F/R KNIT CUFF/COLR XLG BLU

## (undated) DEVICE — GLV SURG BIOGEL LTX PF 7 1/2

## (undated) DEVICE — SPONGE,NEURO,0.5"X3",XR,STRL,LF,10/PK: Brand: MEDLINE

## (undated) DEVICE — STRIP,CLOSURE,WOUND,MEDI-STRIP,1/2X4: Brand: MEDLINE

## (undated) DEVICE — BLADE 1884004 TRICUT 5PK 4MM: Brand: TRICUT®

## (undated) DEVICE — KT ORCA ORCAPOD DISP STRL

## (undated) DEVICE — ADAPT CLN SCPE ENDO PORPOISE BX/50 DISP

## (undated) DEVICE — DRESSING 440402 MEROCEL 10PK STD 8CM: Brand: MEROCEL

## (undated) DEVICE — TRAP SPECI WO/ STIFFNER 4IN